# Patient Record
Sex: FEMALE | Race: WHITE | Employment: OTHER | URBAN - METROPOLITAN AREA
[De-identification: names, ages, dates, MRNs, and addresses within clinical notes are randomized per-mention and may not be internally consistent; named-entity substitution may affect disease eponyms.]

---

## 2021-01-03 ENCOUNTER — APPOINTMENT (OUTPATIENT)
Dept: GENERAL RADIOLOGY | Age: 71
DRG: 176 | End: 2021-01-03
Payer: MEDICARE

## 2021-01-03 ENCOUNTER — APPOINTMENT (OUTPATIENT)
Dept: CT IMAGING | Age: 71
DRG: 176 | End: 2021-01-03
Payer: MEDICARE

## 2021-01-03 ENCOUNTER — HOSPITAL ENCOUNTER (INPATIENT)
Age: 71
LOS: 3 days | Discharge: HOME | DRG: 176 | End: 2021-01-06
Attending: EMERGENCY MEDICINE | Admitting: INTERNAL MEDICINE
Payer: MEDICARE

## 2021-01-03 DIAGNOSIS — I26.99 ACUTE PULMONARY EMBOLISM, UNSPECIFIED PULMONARY EMBOLISM TYPE, UNSPECIFIED WHETHER ACUTE COR PULMONALE PRESENT (HCC): Primary | ICD-10-CM

## 2021-01-03 LAB
ABSOLUTE EOS #: 0.08 K/UL (ref 0–0.44)
ABSOLUTE IMMATURE GRANULOCYTE: 0.02 K/UL (ref 0–0.3)
ABSOLUTE LYMPH #: 1.43 K/UL (ref 1.1–3.7)
ABSOLUTE MONO #: 0.59 K/UL (ref 0.1–1.2)
ANION GAP SERPL CALCULATED.3IONS-SCNC: 14 MMOL/L (ref 9–17)
BASOPHILS # BLD: 1 % (ref 0–2)
BASOPHILS ABSOLUTE: 0.05 K/UL (ref 0–0.2)
BNP INTERPRETATION: ABNORMAL
BUN BLDV-MCNC: 19 MG/DL (ref 8–23)
BUN/CREAT BLD: 22 (ref 9–20)
C-REACTIVE PROTEIN: 22.7 MG/L (ref 0–5)
CALCIUM SERPL-MCNC: 8.9 MG/DL (ref 8.6–10.4)
CHLORIDE BLD-SCNC: 106 MMOL/L (ref 98–107)
CO2: 18 MMOL/L (ref 20–31)
CREAT SERPL-MCNC: 0.85 MG/DL (ref 0.5–0.9)
DIFFERENTIAL TYPE: ABNORMAL
EOSINOPHILS RELATIVE PERCENT: 1 % (ref 1–4)
FERRITIN: 144 UG/L (ref 13–150)
GFR AFRICAN AMERICAN: >60 ML/MIN
GFR NON-AFRICAN AMERICAN: >60 ML/MIN
GFR SERPL CREATININE-BSD FRML MDRD: ABNORMAL ML/MIN/{1.73_M2}
GFR SERPL CREATININE-BSD FRML MDRD: ABNORMAL ML/MIN/{1.73_M2}
GLUCOSE BLD-MCNC: 155 MG/DL (ref 70–99)
HCT VFR BLD CALC: 41.5 % (ref 36.3–47.1)
HEMOGLOBIN: 12.1 G/DL (ref 11.9–15.1)
IMMATURE GRANULOCYTES: 0 %
INR BLD: 1.1
LYMPHOCYTES # BLD: 21 % (ref 24–43)
MCH RBC QN AUTO: 28.7 PG (ref 25.2–33.5)
MCHC RBC AUTO-ENTMCNC: 29.2 G/DL (ref 28.4–34.8)
MCV RBC AUTO: 98.6 FL (ref 82.6–102.9)
MONOCYTES # BLD: 9 % (ref 3–12)
MYOGLOBIN: 25 NG/ML (ref 25–58)
MYOGLOBIN: 32 NG/ML (ref 25–58)
NRBC AUTOMATED: 0 PER 100 WBC
PARTIAL THROMBOPLASTIN TIME: 25.4 SEC (ref 23.9–33.8)
PDW BLD-RTO: 12.6 % (ref 11.8–14.4)
PLATELET # BLD: 144 K/UL (ref 138–453)
PLATELET ESTIMATE: ABNORMAL
PMV BLD AUTO: 11.4 FL (ref 8.1–13.5)
POTASSIUM SERPL-SCNC: 3.5 MMOL/L (ref 3.7–5.3)
PRO-BNP: 6225 PG/ML
PROCALCITONIN: 0.07 NG/ML
PROTHROMBIN TIME: 13.7 SEC (ref 11.5–14.2)
RBC # BLD: 4.21 M/UL (ref 3.95–5.11)
RBC # BLD: ABNORMAL 10*6/UL
SEG NEUTROPHILS: 68 % (ref 36–65)
SEGMENTED NEUTROPHILS ABSOLUTE COUNT: 4.63 K/UL (ref 1.5–8.1)
SODIUM BLD-SCNC: 138 MMOL/L (ref 135–144)
TROPONIN INTERP: ABNORMAL
TROPONIN T: ABNORMAL NG/ML
TROPONIN, HIGH SENSITIVITY: 25 NG/L (ref 0–14)
TROPONIN, HIGH SENSITIVITY: 26 NG/L (ref 0–14)
TROPONIN, HIGH SENSITIVITY: 28 NG/L (ref 0–14)
WBC # BLD: 6.8 K/UL (ref 3.5–11.3)
WBC # BLD: ABNORMAL 10*3/UL

## 2021-01-03 PROCEDURE — 80048 BASIC METABOLIC PNL TOTAL CA: CPT

## 2021-01-03 PROCEDURE — 82728 ASSAY OF FERRITIN: CPT

## 2021-01-03 PROCEDURE — 2060000000 HC ICU INTERMEDIATE R&B

## 2021-01-03 PROCEDURE — 6360000004 HC RX CONTRAST MEDICATION: Performed by: EMERGENCY MEDICINE

## 2021-01-03 PROCEDURE — 93005 ELECTROCARDIOGRAM TRACING: CPT | Performed by: EMERGENCY MEDICINE

## 2021-01-03 PROCEDURE — 2580000003 HC RX 258: Performed by: EMERGENCY MEDICINE

## 2021-01-03 PROCEDURE — 6360000002 HC RX W HCPCS: Performed by: EMERGENCY MEDICINE

## 2021-01-03 PROCEDURE — 83880 ASSAY OF NATRIURETIC PEPTIDE: CPT

## 2021-01-03 PROCEDURE — 71045 X-RAY EXAM CHEST 1 VIEW: CPT

## 2021-01-03 PROCEDURE — 86140 C-REACTIVE PROTEIN: CPT

## 2021-01-03 PROCEDURE — 99284 EMERGENCY DEPT VISIT MOD MDM: CPT

## 2021-01-03 PROCEDURE — 85025 COMPLETE CBC W/AUTO DIFF WBC: CPT

## 2021-01-03 PROCEDURE — 84484 ASSAY OF TROPONIN QUANT: CPT

## 2021-01-03 PROCEDURE — 83874 ASSAY OF MYOGLOBIN: CPT

## 2021-01-03 PROCEDURE — 71260 CT THORAX DX C+: CPT

## 2021-01-03 PROCEDURE — 84145 PROCALCITONIN (PCT): CPT

## 2021-01-03 PROCEDURE — 85730 THROMBOPLASTIN TIME PARTIAL: CPT

## 2021-01-03 PROCEDURE — 85610 PROTHROMBIN TIME: CPT

## 2021-01-03 PROCEDURE — 36415 COLL VENOUS BLD VENIPUNCTURE: CPT

## 2021-01-03 PROCEDURE — 99223 1ST HOSP IP/OBS HIGH 75: CPT | Performed by: NURSE PRACTITIONER

## 2021-01-03 RX ORDER — HEPARIN SODIUM 10000 [USP'U]/100ML
17.14 INJECTION, SOLUTION INTRAVENOUS CONTINUOUS
Status: DISCONTINUED | OUTPATIENT
Start: 2021-01-03 | End: 2021-01-06

## 2021-01-03 RX ORDER — SODIUM CHLORIDE 0.9 % (FLUSH) 0.9 %
10 SYRINGE (ML) INJECTION PRN
Status: DISCONTINUED | OUTPATIENT
Start: 2021-01-03 | End: 2021-01-06 | Stop reason: HOSPADM

## 2021-01-03 RX ORDER — HEPARIN SODIUM 1000 [USP'U]/ML
40 INJECTION, SOLUTION INTRAVENOUS; SUBCUTANEOUS PRN
Status: DISCONTINUED | OUTPATIENT
Start: 2021-01-03 | End: 2021-01-06 | Stop reason: ALTCHOICE

## 2021-01-03 RX ORDER — 0.9 % SODIUM CHLORIDE 0.9 %
80 INTRAVENOUS SOLUTION INTRAVENOUS ONCE
Status: COMPLETED | OUTPATIENT
Start: 2021-01-03 | End: 2021-01-03

## 2021-01-03 RX ORDER — HEPARIN SODIUM 1000 [USP'U]/ML
80 INJECTION, SOLUTION INTRAVENOUS; SUBCUTANEOUS ONCE
Status: COMPLETED | OUTPATIENT
Start: 2021-01-03 | End: 2021-01-03

## 2021-01-03 RX ORDER — HEPARIN SODIUM 1000 [USP'U]/ML
80 INJECTION, SOLUTION INTRAVENOUS; SUBCUTANEOUS PRN
Status: DISCONTINUED | OUTPATIENT
Start: 2021-01-03 | End: 2021-01-06 | Stop reason: ALTCHOICE

## 2021-01-03 RX ORDER — POLYETHYLENE GLYCOL 3350 17 G/17G
17 POWDER, FOR SOLUTION ORAL DAILY PRN
Status: DISCONTINUED | OUTPATIENT
Start: 2021-01-03 | End: 2021-01-06 | Stop reason: HOSPADM

## 2021-01-03 RX ORDER — CARVEDILOL 3.12 MG/1
3.12 TABLET ORAL 2 TIMES DAILY WITH MEALS
Status: DISCONTINUED | OUTPATIENT
Start: 2021-01-04 | End: 2021-01-06 | Stop reason: HOSPADM

## 2021-01-03 RX ORDER — ACETAMINOPHEN 650 MG/1
650 SUPPOSITORY RECTAL EVERY 6 HOURS PRN
Status: DISCONTINUED | OUTPATIENT
Start: 2021-01-03 | End: 2021-01-06 | Stop reason: HOSPADM

## 2021-01-03 RX ORDER — NICOTINE 21 MG/24HR
1 PATCH, TRANSDERMAL 24 HOURS TRANSDERMAL DAILY PRN
Status: DISCONTINUED | OUTPATIENT
Start: 2021-01-03 | End: 2021-01-06 | Stop reason: HOSPADM

## 2021-01-03 RX ORDER — SODIUM CHLORIDE 0.9 % (FLUSH) 0.9 %
10 SYRINGE (ML) INJECTION EVERY 12 HOURS SCHEDULED
Status: DISCONTINUED | OUTPATIENT
Start: 2021-01-03 | End: 2021-01-06 | Stop reason: HOSPADM

## 2021-01-03 RX ORDER — LISINOPRIL 10 MG/1
5 TABLET ORAL DAILY
Status: DISCONTINUED | OUTPATIENT
Start: 2021-01-04 | End: 2021-01-06 | Stop reason: HOSPADM

## 2021-01-03 RX ORDER — ACETAMINOPHEN 325 MG/1
650 TABLET ORAL EVERY 6 HOURS PRN
Status: DISCONTINUED | OUTPATIENT
Start: 2021-01-03 | End: 2021-01-06 | Stop reason: HOSPADM

## 2021-01-03 RX ORDER — ONDANSETRON 2 MG/ML
4 INJECTION INTRAMUSCULAR; INTRAVENOUS EVERY 6 HOURS PRN
Status: DISCONTINUED | OUTPATIENT
Start: 2021-01-03 | End: 2021-01-06 | Stop reason: HOSPADM

## 2021-01-03 RX ORDER — PROMETHAZINE HYDROCHLORIDE 12.5 MG/1
12.5 TABLET ORAL EVERY 6 HOURS PRN
Status: DISCONTINUED | OUTPATIENT
Start: 2021-01-03 | End: 2021-01-06 | Stop reason: HOSPADM

## 2021-01-03 RX ADMIN — IOPAMIDOL 75 ML: 755 INJECTION, SOLUTION INTRAVENOUS at 20:35

## 2021-01-03 RX ADMIN — HEPARIN SODIUM AND DEXTROSE 17.14 UNITS/KG/HR: 10000; 5 INJECTION INTRAVENOUS at 22:21

## 2021-01-03 RX ADMIN — HEPARIN SODIUM 9800 UNITS: 1000 INJECTION INTRAVENOUS; SUBCUTANEOUS at 22:21

## 2021-01-03 RX ADMIN — SODIUM CHLORIDE 80 ML: 9 INJECTION, SOLUTION INTRAVENOUS at 20:35

## 2021-01-03 RX ADMIN — Medication 10 ML: at 20:35

## 2021-01-03 SDOH — HEALTH STABILITY: MENTAL HEALTH: HOW OFTEN DO YOU HAVE A DRINK CONTAINING ALCOHOL?: NEVER

## 2021-01-03 ASSESSMENT — ENCOUNTER SYMPTOMS
FACIAL SWELLING: 0
EYE REDNESS: 0
VOMITING: 0
DIARRHEA: 0
COUGH: 1
COLOR CHANGE: 0
SHORTNESS OF BREATH: 1
EYE DISCHARGE: 0
ABDOMINAL PAIN: 0
CONSTIPATION: 0

## 2021-01-03 NOTE — ED NOTES
Patient presents to the er with SOB. Patient was recently seen at Urgent Care and sent home with Rx treating for URI. Was later called back and told to stop meds and follow at Emergency room setting for fluid overload. Patient verbalizing has not been to a physician in 10 years, states is not on any medications. Patient denies fever or chills and reports Covid test at Urgent care was negative.      Jennifer Lockett RN  01/03/21 3131

## 2021-01-03 NOTE — ED PROVIDER NOTES
arthralgias. Skin: Negative for color change and rash. Neurological: Negative for syncope, numbness and headaches. Hematological: Negative for adenopathy. Psychiatric/Behavioral: Negative for confusion. The patient is not nervous/anxious. Except as noted above the remainder of the review of systems was reviewed and negative. PHYSICAL EXAM    (up to 7 for level 4, 8 or more for level 5)     Vitals:    01/03/21 1651 01/03/21 1653 01/03/21 1854 01/03/21 2000   BP:  (!) 157/99 101/71 (!) 153/109   Pulse:  131 115 109   Resp:  18 25 23   Temp:  97.9 °F (36.6 °C)     SpO2:  91% 97% 97%   Weight: 270 lb (122.5 kg)          Physical Exam  Vitals signs reviewed. Constitutional:       General: She is not in acute distress. Appearance: She is well-developed. She is not diaphoretic. HENT:      Head: Normocephalic and atraumatic. Eyes:      General:         Right eye: No discharge. Left eye: No discharge. Neck:      Musculoskeletal: Neck supple. Cardiovascular:      Rate and Rhythm: Tachycardia present. Pulmonary:      Effort: Pulmonary effort is normal. No respiratory distress. Breath sounds: No stridor. Abdominal:      General: There is no distension. Musculoskeletal: Normal range of motion. Lymphadenopathy:      Cervical: No cervical adenopathy. Skin:     Findings: No erythema or rash. Neurological:      Mental Status: She is alert and oriented to person, place, and time. Psychiatric:      Comments: She appears anxious.              DIAGNOSTIC RESULTS     EKG: All EKG's are interpreted by the Emergency Department Physician who either signs or Co-signs this chart in the absence of a cardiologist.    RADIOLOGY:   Non-plain film images such as CT, Ultrasound and MRI are read by the radiologist. Plain radiographic images are visualized and preliminarily interpreted by the emergency physician with the below findings:    Interpretation per the Radiologist below, if available at the time of this note:        LABS:  Labs Reviewed   BASIC METABOLIC PANEL - Abnormal; Notable for the following components:       Result Value    Glucose 155 (*)     Bun/Cre Ratio 22 (*)     Potassium 3.5 (*)     CO2 18 (*)     All other components within normal limits   BRAIN NATRIURETIC PEPTIDE - Abnormal; Notable for the following components:    Pro-BNP 6,225 (*)     All other components within normal limits   CBC WITH AUTO DIFFERENTIAL - Abnormal; Notable for the following components:    Seg Neutrophils 68 (*)     Lymphocytes 21 (*)     All other components within normal limits   TROP/MYOGLOBIN - Abnormal; Notable for the following components:    Troponin, High Sensitivity 28 (*)     All other components within normal limits   TROP/MYOGLOBIN - Abnormal; Notable for the following components:    Troponin, High Sensitivity 26 (*)     All other components within normal limits   APTT   PROTIME-INR   FERRITIN   C-REACTIVE PROTEIN   PROCALCITONIN       All other labs were within normal range or not returned as of this dictation. EMERGENCY DEPARTMENT COURSE and DIFFERENTIAL DIAGNOSIS/MDM:   Vitals:    Vitals:    01/03/21 1651 01/03/21 1653 01/03/21 1854 01/03/21 2000   BP:  (!) 157/99 101/71 (!) 153/109   Pulse:  131 115 109   Resp:  18 25 23   Temp:  97.9 °F (36.6 °C)     SpO2:  91% 97% 97%   Weight: 270 lb (122.5 kg)          Orders Placed This Encounter   Medications    0.9 % sodium chloride bolus    sodium chloride flush 0.9 % injection 10 mL    iopamidol (ISOVUE-370) 76 % injection 75 mL       Medical Decision Making: Tests are ordered and the patient is signed out to subsequent physician.           (Please note that portions of this note were completed with a voice recognition program.  Efforts were made to edit the dictations but occasionally words are mis-transcribed.)    Noris Matute MD  Attending Emergency Physician           Noris Matute MD  01/03/21 2041

## 2021-01-04 ENCOUNTER — APPOINTMENT (OUTPATIENT)
Dept: GENERAL RADIOLOGY | Age: 71
DRG: 176 | End: 2021-01-04
Payer: MEDICARE

## 2021-01-04 PROBLEM — I27.20 MILD PULMONARY HYPERTENSION (HCC): Status: ACTIVE | Noted: 2021-01-04

## 2021-01-04 PROBLEM — D64.9 ANEMIA, NORMOCYTIC NORMOCHROMIC: Status: ACTIVE | Noted: 2021-01-04

## 2021-01-04 PROBLEM — R79.89 TROPONIN LEVEL ELEVATED: Status: ACTIVE | Noted: 2021-01-04

## 2021-01-04 PROBLEM — R77.8 TROPONIN LEVEL ELEVATED: Status: ACTIVE | Noted: 2021-01-04

## 2021-01-04 PROBLEM — R03.0 ELEVATED BLOOD PRESSURE READING: Status: ACTIVE | Noted: 2021-01-04

## 2021-01-04 LAB
ANTI-XA UNFRAC HEPARIN: 0.6 IU/L (ref 0.3–0.7)
ANTI-XA UNFRAC HEPARIN: 0.69 IU/L (ref 0.3–0.7)
ANTI-XA UNFRAC HEPARIN: 1.09 IU/L (ref 0.3–0.7)
ANTI-XA UNFRAC HEPARIN: >1.1 IU/L (ref 0.3–0.7)
BNP INTERPRETATION: ABNORMAL
CHOLESTEROL/HDL RATIO: 2.9
CHOLESTEROL: 138 MG/DL
HCT VFR BLD CALC: 35.1 % (ref 36.3–47.1)
HDLC SERPL-MCNC: 48 MG/DL
HEMOGLOBIN: 11 G/DL (ref 11.9–15.1)
LDL CHOLESTEROL: 68 MG/DL (ref 0–130)
LV EF: 60 %
LVEF MODALITY: NORMAL
MAGNESIUM: 1.8 MG/DL (ref 1.6–2.6)
MCH RBC QN AUTO: 29.3 PG (ref 25.2–33.5)
MCHC RBC AUTO-ENTMCNC: 31.3 G/DL (ref 28.4–34.8)
MCV RBC AUTO: 93.4 FL (ref 82.6–102.9)
NRBC AUTOMATED: 0 PER 100 WBC
PDW BLD-RTO: 12.7 % (ref 11.8–14.4)
PLATELET # BLD: 148 K/UL (ref 138–453)
PMV BLD AUTO: 11.1 FL (ref 8.1–13.5)
PRO-BNP: 3290 PG/ML
RBC # BLD: 3.76 M/UL (ref 3.95–5.11)
TRIGL SERPL-MCNC: 108 MG/DL
TROPONIN INTERP: ABNORMAL
TROPONIN T: ABNORMAL NG/ML
TROPONIN, HIGH SENSITIVITY: 26 NG/L (ref 0–14)
TSH SERPL DL<=0.05 MIU/L-ACNC: <0.01 MIU/L (ref 0.3–5)
VLDLC SERPL CALC-MCNC: NORMAL MG/DL (ref 1–30)
WBC # BLD: 7.2 K/UL (ref 3.5–11.3)

## 2021-01-04 PROCEDURE — 93306 TTE W/DOPPLER COMPLETE: CPT

## 2021-01-04 PROCEDURE — 85027 COMPLETE CBC AUTOMATED: CPT

## 2021-01-04 PROCEDURE — 85520 HEPARIN ASSAY: CPT

## 2021-01-04 PROCEDURE — 99232 SBSQ HOSP IP/OBS MODERATE 35: CPT | Performed by: INTERNAL MEDICINE

## 2021-01-04 PROCEDURE — 6370000000 HC RX 637 (ALT 250 FOR IP): Performed by: NURSE PRACTITIONER

## 2021-01-04 PROCEDURE — 6360000002 HC RX W HCPCS: Performed by: EMERGENCY MEDICINE

## 2021-01-04 PROCEDURE — 80061 LIPID PANEL: CPT

## 2021-01-04 PROCEDURE — 83735 ASSAY OF MAGNESIUM: CPT

## 2021-01-04 PROCEDURE — 2060000000 HC ICU INTERMEDIATE R&B

## 2021-01-04 PROCEDURE — 83880 ASSAY OF NATRIURETIC PEPTIDE: CPT

## 2021-01-04 PROCEDURE — 84443 ASSAY THYROID STIM HORMONE: CPT

## 2021-01-04 PROCEDURE — 84484 ASSAY OF TROPONIN QUANT: CPT

## 2021-01-04 PROCEDURE — 36415 COLL VENOUS BLD VENIPUNCTURE: CPT

## 2021-01-04 PROCEDURE — 71045 X-RAY EXAM CHEST 1 VIEW: CPT

## 2021-01-04 RX ADMIN — CARVEDILOL 3.12 MG: 3.12 TABLET, FILM COATED ORAL at 08:28

## 2021-01-04 RX ADMIN — LISINOPRIL 5 MG: 10 TABLET ORAL at 08:28

## 2021-01-04 RX ADMIN — CARVEDILOL 3.12 MG: 3.12 TABLET, FILM COATED ORAL at 18:29

## 2021-01-04 RX ADMIN — HEPARIN SODIUM AND DEXTROSE 14.14 UNITS/KG/HR: 10000; 5 INJECTION INTRAVENOUS at 05:54

## 2021-01-04 ASSESSMENT — ENCOUNTER SYMPTOMS
NAUSEA: 0
COLOR CHANGE: 0
WHEEZING: 0
BLOOD IN STOOL: 0
ABDOMINAL PAIN: 0
CONSTIPATION: 0
COUGH: 1
SHORTNESS OF BREATH: 1
DIARRHEA: 0
VOMITING: 0
COUGH: 0

## 2021-01-04 NOTE — H&P
additional symptomology or definitive modifying factors. She denies past cardiac history or previous history of PE/DVT. She visited the [facility] urgent care on [address] today. They completed a CXR and stated that she was fluid overloaded. They also conducted a COVID-19 swab that was negative. Records have been requested. EKG shows sinus tachycardia without overt ST elevation or depression. Potassium 3.5, CRP 22.7, pro BNP 6,225, high sensitivity troponin 28, 26, WBC 6.8. CT chest with contrast indicates fairly extensive bilateral pulmonary emboli as described in radiological report. Cholelithiasis. Past Medical History:     History reviewed. No pertinent past medical history. Denies history of hypertension, diabetes, asthma and cancer. Past Surgical History:     Past Surgical History:   Procedure Laterality Date    KNEE ARTHROSCOPY        Medications Prior to Admission:     Prior to Admission medications    Not on File   Denies home medications. Allergies:     Patient has no known allergies. Social History:     Tobacco:    reports that she has never smoked. She has never used smokeless tobacco.  Alcohol:      reports no history of alcohol use. Drug Use:  reports no history of drug use. Family History:     Family History   Problem Relation Age of Onset    Cancer Mother     Kidney Disease Father      Review of Systems:     Positive and Negative as described in HPI. Review of Systems   Constitutional: Negative for chills, diaphoresis and fever. HENT: Negative for congestion. Eyes: Negative for visual disturbance. Respiratory: Positive for shortness of breath. Negative for cough and wheezing. Cardiovascular: Positive for leg swelling. Negative for palpitations. Chest heaviness    Gastrointestinal: Negative for abdominal pain, blood in stool, constipation, diarrhea, nausea and vomiting.    Endocrine: Negative for cold intolerance and heat intolerance. Genitourinary: Negative for difficulty urinating, dysuria, frequency and urgency. Musculoskeletal: Negative for arthralgias and myalgias. Skin: Negative for color change and rash. Neurological: Negative for dizziness, weakness, light-headedness, numbness and headaches. Hematological: Does not bruise/bleed easily. Psychiatric/Behavioral: The patient is not nervous/anxious. All other systems reviewed and are negative. Physical Exam:   BP (!) 115/50   Pulse 114   Temp 97.9 °F (36.6 °C)   Resp 18   Wt 270 lb (122.5 kg)   SpO2 97%   Temp (24hrs), Av.9 °F (36.6 °C), Min:97.9 °F (36.6 °C), Max:97.9 °F (36.6 °C)    No results for input(s): POCGLU in the last 72 hours. No intake or output data in the 24 hours ending 21 6669    Physical Exam  Vitals signs and nursing note reviewed. Constitutional:       General: She is not in acute distress. Appearance: She is obese. She is not diaphoretic. HENT:      Head: Normocephalic and atraumatic. Right Ear: Hearing normal.      Left Ear: Hearing normal.      Nose: Nose normal. No rhinorrhea. Eyes:      General: Lids are normal.      Extraocular Movements:      Right eye: Normal extraocular motion. Left eye: Normal extraocular motion. Conjunctiva/sclera: Conjunctivae normal.      Right eye: Right conjunctiva is not injected. Left eye: Left conjunctiva is not injected. Pupils: Pupils are equal, round, and reactive to light. Pupils are equal.      Right eye: Pupil is reactive. Left eye: Pupil is reactive. Neck:      Musculoskeletal: Neck supple. Thyroid: No thyromegaly. Vascular: No carotid bruit. Trachea: Trachea and phonation normal. No tracheal deviation. Cardiovascular:      Rate and Rhythm: Regular rhythm. Tachycardia present. Pulses: Normal pulses. Heart sounds: Normal heart sounds. No murmur.    Pulmonary:      Effort: Pulmonary effort is normal. No respiratory distress. Breath sounds: No stridor. Examination of the right-middle field reveals wheezing. Examination of the left-middle field reveals wheezing. Examination of the right-lower field reveals decreased breath sounds and wheezing. Examination of the left-lower field reveals decreased breath sounds and wheezing. Decreased breath sounds and wheezing present. Abdominal:      General: Bowel sounds are normal. There is no distension. Palpations: Abdomen is soft. There is no mass. Tenderness: There is no abdominal tenderness. There is no guarding. Musculoskeletal:         General: No tenderness. Right lower le+ Pitting Edema present. Left lower le+ Pitting Edema present. Skin:     General: Skin is warm and dry. Findings: No erythema, lesion or rash. Neurological:      Mental Status: She is alert and oriented to person, place, and time. She is not disoriented. Cranial Nerves: No cranial nerve deficit. Psychiatric:         Speech: Speech normal.         Behavior: Behavior normal. Behavior is cooperative.        Investigations:      Laboratory Testing:  Recent Results (from the past 24 hour(s))   Basic Metabolic Panel    Collection Time: 21  5:11 PM   Result Value Ref Range    Glucose 155 (H) 70 - 99 mg/dL    BUN 19 8 - 23 mg/dL    CREATININE 0.85 0.50 - 0.90 mg/dL    Bun/Cre Ratio 22 (H) 9 - 20    Calcium 8.9 8.6 - 10.4 mg/dL    Sodium 138 135 - 144 mmol/L    Potassium 3.5 (L) 3.7 - 5.3 mmol/L    Chloride 106 98 - 107 mmol/L    CO2 18 (L) 20 - 31 mmol/L    Anion Gap 14 9 - 17 mmol/L    GFR Non-African American >60 >60 mL/min    GFR African American >60 >60 mL/min    GFR Comment          GFR Staging NOT REPORTED    Brain Natriuretic Peptide    Collection Time: 21  5:11 PM   Result Value Ref Range    Pro-BNP 6,225 (H) <300 pg/mL    BNP Interpretation Pro-BNP Reference Range:    CBC Auto Differential    Collection Time: 21  5:11 PM   Result Value Ref Range WBC 6.8 3.5 - 11.3 k/uL    RBC 4.21 3.95 - 5.11 m/uL    Hemoglobin 12.1 11.9 - 15.1 g/dL    Hematocrit 41.5 36.3 - 47.1 %    MCV 98.6 82.6 - 102.9 fL    MCH 28.7 25.2 - 33.5 pg    MCHC 29.2 28.4 - 34.8 g/dL    RDW 12.6 11.8 - 14.4 %    Platelets 844 747 - 466 k/uL    MPV 11.4 8.1 - 13.5 fL    NRBC Automated 0.0 0.0 per 100 WBC    Differential Type NOT REPORTED     Seg Neutrophils 68 (H) 36 - 65 %    Lymphocytes 21 (L) 24 - 43 %    Monocytes 9 3 - 12 %    Eosinophils % 1 1 - 4 %    Basophils 1 0 - 2 %    Immature Granulocytes 0 0 %    Segs Absolute 4.63 1.50 - 8.10 k/uL    Absolute Lymph # 1.43 1.10 - 3.70 k/uL    Absolute Mono # 0.59 0.10 - 1.20 k/uL    Absolute Eos # 0.08 0.00 - 0.44 k/uL    Basophils Absolute 0.05 0.00 - 0.20 k/uL    Absolute Immature Granulocyte 0.02 0.00 - 0.30 k/uL    WBC Morphology NOT REPORTED     RBC Morphology NOT REPORTED     Platelet Estimate NOT REPORTED    TROP/MYOGLOBIN    Collection Time: 01/03/21  5:11 PM   Result Value Ref Range    Troponin, High Sensitivity 28 (H) 0 - 14 ng/L    Troponin T NOT REPORTED <0.03 ng/mL    Troponin Interp NOT REPORTED     Myoglobin 32 25 - 58 ng/mL   APTT    Collection Time: 01/03/21  5:11 PM   Result Value Ref Range    PTT 25.4 23.9 - 33.8 sec   Protime-INR    Collection Time: 01/03/21  5:11 PM   Result Value Ref Range    Protime 13.7 11.5 - 14.2 sec    INR 1.1    FERRITIN    Collection Time: 01/03/21  5:11 PM   Result Value Ref Range    Ferritin 144 13 - 150 ug/L   C-Reactive Protein    Collection Time: 01/03/21  5:11 PM   Result Value Ref Range    CRP 22.7 (H) 0.0 - 5.0 mg/L   Procalcitonin    Collection Time: 01/03/21  5:11 PM   Result Value Ref Range    Procalcitonin 0.07 <0.09 ng/mL   EKG 12 Lead    Collection Time: 01/03/21  5:12 PM   Result Value Ref Range    Ventricular Rate 112 BPM    Atrial Rate 112 BPM    P-R Interval 148 ms    QRS Duration 94 ms    Q-T Interval 344 ms    QTc Calculation (Bazett) 469 ms    P Axis 50 degrees    R Axis 7 degrees    T Axis 58 degrees   TROP/MYOGLOBIN    Collection Time: 01/03/21  7:11 PM   Result Value Ref Range    Troponin, High Sensitivity 26 (H) 0 - 14 ng/L    Troponin T NOT REPORTED <0.03 ng/mL    Troponin Interp NOT REPORTED     Myoglobin 25 25 - 58 ng/mL       Imaging/Diagnostics:  Xr Chest Portable    Result Date: 1/3/2021  No acute cardiopulmonary disease     Ct Chest Pulmonary Embolism W Contrast    Result Date: 1/3/2021  Fairly extensive bilateral pulmonary emboli as described above Cholelithiasis Critical results were called by Dr. Debra Palacio MD to Cole Raf on 1/3/2021 at 18:09. Assessment :      Hospital Problems           Last Modified POA    * (Principal) Bilateral pulmonary embolism ([address] 1/3/2021 Yes    Obesity due to excess calories without serious comorbidity 1/3/2021 Yes        Plan:     Patient status inpatient in the  Medical ICU    1. EKG, CXR and CT reviewed. 2. Echocardiogram.  3. High intensity heparin drip. 4. Initiate carvedilol & lisinopril. 5. Monitor vital signs. 6. Follow chemistries. 7. Check lipids, TSH.   8. Supplemental oxygen as needed. 9. Urgent care records including negative COVID-19 results requested. 10. Obesity- lifestyle modifications. 11. Cardiac diet. 12. Activity as tolerated with assistance. Plan of care discussed with patient and patient's daughter Mikayla Carlson) at bedside. Consultations:   IP CONSULT TO INTERNAL MEDICINE  IP CONSULT TO HEART FAILURE NURSE/COORDINATOR  IP CONSULT TO DIETITIAN    Patient is admitted as inpatient status because of co-morbidities listed above, severity of signs and symptoms as outlined, requirement for current medical therapies and most importantly because of direct risk to patient if care not provided in a hospital setting. Expected length of stay > 48 hours. ALBINO Lopez - CNP  1/3/2021  10:54 PM    Copy sent to Dr. Aiden Langston primary care provider on file.

## 2021-01-04 NOTE — PROGRESS NOTES
[facility]    Office: [address], DO, Paola [place], DO, Meme Calles, DO, Susan Kendrick Blood, DO, Asia Wallace MD, Sandeep Davila MD, Jayme Han MD, Radha Goldman MD, Lacho Smith MD, Nicholas Helm MD, Raquel Boyer MD, Carol Porter MD, Weston Joiner MD, Jeppie Soulier, DO, Dong Barnett MD, Bonnie Shrestha MD, Pal Cardona DO, Stella Eller MD,  Carlene Morel, DO, Deepika Duke MD, Claudia Owens MD, Asim Kelly CNP, [facility], CNP, Jessie Babcock, CNP, Chio Anderson, CNS, Katerina Howell, CNP, Lisa Hudson, CNP, Sanjuanita Judge, CNP, Chris Villalobos, CNP, Ryder Sousa, CNP, Ozzy Turner PA-C, Annie Hsieh, RAYMOND, Ruben Fox, CNP, Sergio Martin, CNP, Bina Taylor, CNP, Shiva Samuel, CNP, Elvis Severs, CNP    [address]    Progress Note    1/4/2021    7:09 PM    Name:   Shanique Dumont  MRN:     [id number]     [place]zen[place]side:      [de-identified]   Room:   [address] [place] [address] Day:  1  Admit Date:  1/3/2021  4:56 PM    PCP:   No primary care provider on file. Code Status:  Full Code    Subjective:     C/C:   Chief Complaint   Patient presents with    Shortness of Breath       Interval History Status:  Improved  Lying flat  Not short of breath  Less dyspnea on exertion    Data Base Updates:  Troponin, High [id number]     Olueepvlfz24.0Low     Echo done: See below  Mild pulmonary hypertension noted    Blood pressures in the 160s/100s noted      Brief History:     As documented in the medical record:  Alee Luz is a 65 y.o. Non-/non  female who presents with Shortness of Breath   and is admitted to the hospital for the management of Bilateral pulmonary embolism ([facility] [address]    The patient presents to the hospital with complaint of shortness of breath. She reports her symptoms started 7-10 days ago and have progressively worsened.  She states that she becomes extremely short of breath with minimal activity. She is able to catch her breath if she rests. She also endorses swelling to her bilateral lower extremities and chest heaviness with activity. She denies fever, chills, nausea, vomiting. No additional symptomology or definitive modifying factors. She denies past cardiac history or previous history of PE/DVT.    She visited the [facility] urgent care on [address] today. They completed a CXR and stated that she was fluid overloaded. They also conducted a COVID-19 swab that was negative. Records have been requested.      EKG shows sinus tachycardia without overt ST elevation or depression.      Potassium 3.5, CRP 22.7, pro BNP 6,225, high sensitivity troponin 28, 26, WBC 6.8.     CT chest with contrast indicates fairly extensive bilateral pulmonary emboli as described in radiological report. Cholelithiasis. Initial plan included:  Patient status inpatient in the  Medical ICU   1. EKG, CXR and CT reviewed. 2. Echocardiogram.  3. High intensity heparin drip. 4. Initiate carvedilol & lisinopril. 5. Monitor vital signs. 6. Follow chemistries. 7. Check lipids, TSH.   8. Supplemental oxygen as needed. 9. Urgent care records including negative COVID-19 results requested. 10. Obesity- lifestyle modifications. 11. Cardiac diet. 12. Activity as tolerated with assistance.   Plan of care discussed with patient and patient's daughter Ravindra Monique) at bedside. \"     The patient does acknowledge a 3-hour road trip in her car during the holidays  No prior history of DVT or hypercoagulable state  Family history negative    Subsequent database has included:  Azejanavjw48.0Low     Troponin, High [id number]     Echo:  Left ventricle is normal in size   Global left ventricular systolic function is normal   Estimated ejection fraction is 60 % . Left atrium is mildly dilated. Right atrium is mildly dilated . Right ventricular dilatation with normal systolic function.    TAPSE value of 1.88cm noted.   Mild to moderate tricuspid regurgitation. Mild pulmonary hypertension. No pericardial effusion seen. Normal aortic root dimension. Initial EKG revealed:  Sinus tachycardia  Otherwise normal ECG  No previous ECGs available      Medications: Allergies:  No Known Allergies    Current Meds:   Scheduled Meds:    sodium chloride flush  10 mL Intravenous 2 times per day    lisinopril  5 mg Oral Daily    carvedilol  3.125 mg Oral BID WC     Continuous Infusions:    heparin (PORCINE) Infusion 11.14 Units/kg/hr (01/04/21 1132)     PRN Meds: sodium chloride flush, heparin (porcine), heparin (porcine), sodium chloride flush, nicotine, promethazine **OR** ondansetron, polyethylene glycol, acetaminophen **OR** acetaminophen    Data:     Past Medical History:   has no past medical history on file. Social History:   reports that she has never smoked. She has never used smokeless tobacco. She reports that she does not drink alcohol or use drugs. Family History:   Family History   Problem Relation Age of Onset    Cancer Mother     Kidney Disease Father        Review of Systems:     Review of Systems   Constitutional: Positive for activity change (Decreased). Respiratory: Positive for cough (Occasional lightspeed) and shortness of breath (Dyspnea on exertion). No hemoptysis   Cardiovascular: Positive for chest pain (With deep breathing) and leg swelling (Chronic). Negative for palpitations. Gastrointestinal: Negative for abdominal pain, nausea and vomiting. Genitourinary: Negative for flank pain and hematuria. Physical Examination:        Physical Exam  Vitals signs reviewed. Constitutional:       General: She is not in acute distress. Appearance: She is not diaphoretic. HENT:      Head: Normocephalic. Nose: Nose normal.   Eyes:      General: No scleral icterus. Conjunctiva/sclera: Conjunctivae normal.   Neck:      Musculoskeletal: Neck supple.       Trachea: No tracheal deviation. Cardiovascular:      Rate and Rhythm: Normal rate and regular rhythm. Pulmonary:      Effort: Pulmonary effort is normal. No respiratory distress. Breath sounds: Normal breath sounds. No wheezing or rales. Chest:      Chest wall: No tenderness. Abdominal:      General: Bowel sounds are normal. There is no distension. Palpations: Abdomen is soft. Tenderness: There is no abdominal tenderness. Musculoskeletal:         General: Swelling present. No tenderness. Left lower leg: Edema present. Comments: 1-2/4 edema bilaterally   Skin:     General: Skin is warm and dry. Neurological:      Mental Status: She is alert and oriented to person, place, and time. Vitals:  BP (!) 145/78   Pulse 110   Temp 97.5 °F (36.4 °C) (Oral)   Resp 18   Wt 270 lb (122.5 kg)   SpO2 98%   Temp (24hrs), Av.5 °F (36.4 °C), Min:97.5 °F (36.4 °C), Max:97.5 °F (36.4 °C)    No results for input(s): POCGLU in the last 72 hours. I/O (24Hr):   No intake or output data in the 24 hours ending 21 1909    Labs:  Hematology:  Recent Labs     21  17121  0906   WBC 6.8 7.2   RBC 4.21 3.76*   HGB 12.1 11.0*   HCT 41.5 35.1*   MCV 98.6 93.4   MCH 28.7 29.3   MCHC 29.2 31.3   RDW 12.6 12.7    148   MPV 11.4 11.1   CRP 22.7*  --    INR 1.1  --      Chemistry:  Recent Labs     21  17121  1911 21  2249 21  0320 21  0906     --   --   --   --    K 3.5*  --   --   --   --      --   --   --   --    CO2 18*  --   --   --   --    GLUCOSE 155*  --   --   --   --    BUN 19  --   --   --   --    CREATININE 0.85  --   --   --   --    MG  --   --   --   --  1.8   ANIONGAP 14  --   --   --   --    LABGLOM >60  --   --   --   --    GFRAA >60  --   --   --   --    CALCIUM 8.9  --   --   --   --    PROBNP 6,225*  --   --   --  3,290*   TROPHS 28* 26* 25* 26*  --    MYOGLOBIN 32 25  --   --   --      Recent Labs     21  0906   TSH <0.01*   CHOL 138   HDL 48   LDLCHOLESTEROL 68   CHOLHDLRATIO 2.9   TRIG 108   VLDL NOT REPORTED     ABG:No results found for: POCPH, PHART, PH, POCPCO2, LUJ0NKP, PCO2, POCPO2, PO2ART, PO2, POCHCO3, EKA2ELV, HCO3, NBEA, PBEA, BEART, BE, THGBART, THB, FOR3RWR, ZVZI8GOI, W0WITPFA, O2SAT, FIO2  No results found for: SPECIAL  No results found for: CULTURE    Radiology:  Xr Chest Portable    Result Date: 1/4/2021  No acute cardiopulmonary findings     Xr Chest Portable    Result Date: 1/3/2021  No acute cardiopulmonary disease     Ct Chest Pulmonary Embolism W Contrast    Result Date: 1/3/2021  Fairly extensive bilateral pulmonary emboli as described above Cholelithiasis Critical results were called by Dr. Shana Beck MD to Bill Calixto on 1/3/2021 at 18:09. Assessment:        Principal Problem:    Bilateral pulmonary embolism (HCC)  Active Problems:    Obesity due to excess calories without serious comorbidity    Troponin level elevated    Mild pulmonary hypertension (HCC)    Anemia, normocytic normochromic    Elevated blood pressure readings  Resolved Problems:    * No resolved hospital problems.  *      Plan:        Heparin  Does not appear to be an EKOS candidate  Check Doppler  Transition to NOAC  Blood Pressure - Monitor and control  Monitor H&H, observe for any bleeding  Trend troponin, suspect type II elevation    IP CONSULT TO INTERNAL MEDICINE  IP CONSULT TO HEART FAILURE NURSE/COORDINATOR  IP CONSULT TO Neida Clayton DO  1/4/2021  7:09 PM

## 2021-01-04 NOTE — FLOWSHEET NOTE
Patient + daughter was present. Patient a bit anxious. States about her medical problems. Shared about her problems she is dealing with,.  States taking care of her father, then recently her father . , now she is dealing with that. Daughter is grateful for the visit. States patient is strong. Patient to be admitted to ICU.   shared in presence, prayers, support. Follow up as needed. 21 1242   Encounter Summary   Services provided to: Patient and family together   Referral/Consult From: [address] [place] [address] Visiting   (21)   Complexity of Encounter Moderate   Length of Encounter 15 minutes   Spiritual Assessment Completed Yes   Routine   Type Initial   Assessment Passive; Anxious   Intervention Active listening;Explored feelings, thoughts, concerns;Prayer;Sustaining presence/ Ministry of presence; Discussed illness/injury and it's impact; Discussed belief system/Zoroastrianism practices/diann   Outcome Expressed gratitude;Receptive; Expressed feelings/needs/concerns;Engaged in conversation

## 2021-01-04 NOTE — ED NOTES
aMson Harman from [facility] at bedside speaking with patient.       Elicia Montana RN  01/03/21 1894

## 2021-01-04 NOTE — PROGRESS NOTES
Physical Therapy  DATE: 2021    NAME: Mal Kevin  MRN: [id number]   : 1950    Patient not seen this date for Physical Therapy due to:  [] Blood transfusion in progress  [] Hemodialysis  [] Patient Declined  [] Spine Precautions   [] Strict Bedrest  [] Surgery/ Procedure  [] Testing      [x] Other Per notes, pt. With extensive bilat. PE and has not yet been anticoagulated for 24hrs. Will check after 24hrs. [] PT is being discontinued at this time. Patient independent. No further needs. [] PT is being discontinued at this time due to declining physical/ medical status. Therapy is not appropriate at this time.     Padmini Day, PT

## 2021-01-04 NOTE — ED PROVIDER NOTES
EMERGENCY DEPARTMENT ENCOUNTER   ATTENDING ATTESTATION     Pt Name: Michael Forrest  MRN: [id number]  [place] 1950  Date of evaluation: 1/3/21   Michael Forrest is a 65 y.o. female with CC: Shortness of Breath    MDM:   I took over care of patient from Dr. Layton Moran. Patient is a 26-year-old female who presents to the ED for shortness of breath. COVID-19 negative. BNP of 6225. CT angiogram shows bilateral PEs without evidence of right heart strain. Started on heparin drip in the ED. Discussed case with Prosper Sanabria, who accepts patient for admission to hospital               EKG: All EKG's are interpreted by the Emergency Department Physician who either signs or Co-signs this chart in the absence of a cardiologist.      RADIOLOGY:All plain film, CT, MRI, and formal ultrasound images (except ED bedside ultrasound) are read by the radiologist, see reports below, unless otherwise noted in MDM or here. CT CHEST PULMONARY EMBOLISM W CONTRAST   Final Result   Fairly extensive bilateral pulmonary emboli as described above      Cholelithiasis      Critical results were called by Dr. Yeny Leonard MD to Rikki Rocha on   1/3/2021 at 18:09. XR CHEST PORTABLE   Final Result   No acute cardiopulmonary disease         XR CHEST (2 VW)    (Results Pending)     LABS: All lab results were reviewed by myself, and all abnormals are listed below.   Labs Reviewed   BASIC METABOLIC PANEL - Abnormal; Notable for the following components:       Result Value    Glucose 155 (*)     Bun/Cre Ratio 22 (*)     Potassium 3.5 (*)     CO2 18 (*)     All other components within normal limits   BRAIN NATRIURETIC PEPTIDE - Abnormal; Notable for the following components:    Pro-BNP 6,225 (*)     All other components within normal limits   CBC WITH AUTO DIFFERENTIAL - Abnormal; Notable for the following components:    Seg Neutrophils 68 (*)     Lymphocytes 21 (*)     All other components within normal limits TROP/MYOGLOBIN - Abnormal; Notable for the following components:    Troponin, High Sensitivity 28 (*)     All other components within normal limits   TROP/MYOGLOBIN - Abnormal; Notable for the following components:    Troponin, High Sensitivity 26 (*)     All other components within normal limits   C-REACTIVE PROTEIN - Abnormal; Notable for the following components:    CRP 22.7 (*)     All other components within normal limits   TROPONIN - Abnormal; Notable for the following components:    Troponin, High Sensitivity 25 (*)     All other components within normal limits   APTT   PROTIME-INR   FERRITIN   PROCALCITONIN   HEPARIN LEVEL/ANTI-XA   HEPARIN LEVEL/ANTI-XA   MAGNESIUM   CBC   BRAIN NATRIURETIC PEPTIDE   LIPID PANEL   TSH WITHOUT REFLEX   TROPONIN     CONSULTS:  IP CONSULT TO INTERNAL MEDICINE  IP CONSULT TO HEART FAILURE NURSE/COORDINATOR  IP CONSULT TO DIETITIAN  FINAL IMPRESSION      1. Acute pulmonary embolism, unspecified pulmonary embolism type, unspecified whether acute cor pulmonale present ([address]            PASTMEDICAL HISTORY   History reviewed. No pertinent past medical history. SURGICAL HISTORY       Past Surgical History:   Procedure Laterality Date    KNEE ARTHROSCOPY       CURRENT MEDICATIONS       Previous Medications    No medications on file     ALLERGIES     has No Known Allergies. FAMILY HISTORY     She indicated that her mother is . She indicated that her father is . SOCIAL HISTORY       Social History     Tobacco Use    Smoking status: Never Smoker    Smokeless tobacco: Never Used   Substance Use Topics    Alcohol use: Never     Frequency: Never    Drug use: Never       I personally evaluated and examined the patient in conjunction with the APC and agree with the assessment, treatment plan, and disposition of the patient as recorded by the APC.    Emili Burciaga MD  Attending Emergency Physician          Lucila Nunn MD  21 [id number]       Lucila Nunn, MD  01/04/21 7738

## 2021-01-05 LAB
ANTI-XA UNFRAC HEPARIN: 0.58 IU/L (ref 0.3–0.7)
EKG ATRIAL RATE: 112 BPM
EKG P AXIS: 50 DEGREES
EKG P-R INTERVAL: 148 MS
EKG Q-T INTERVAL: 344 MS
EKG QRS DURATION: 94 MS
EKG QTC CALCULATION (BAZETT): 469 MS
EKG R AXIS: 7 DEGREES
EKG T AXIS: 58 DEGREES
EKG VENTRICULAR RATE: 112 BPM
HCT VFR BLD CALC: 35.1 % (ref 36.3–47.1)
HEMOGLOBIN: 10.7 G/DL (ref 11.9–15.1)
MCH RBC QN AUTO: 28.4 PG (ref 25.2–33.5)
MCHC RBC AUTO-ENTMCNC: 30.5 G/DL (ref 28.4–34.8)
MCV RBC AUTO: 93.1 FL (ref 82.6–102.9)
NRBC AUTOMATED: 0 PER 100 WBC
PDW BLD-RTO: 12.7 % (ref 11.8–14.4)
PLATELET # BLD: 157 K/UL (ref 138–453)
PMV BLD AUTO: 11.1 FL (ref 8.1–13.5)
RBC # BLD: 3.77 M/UL (ref 3.95–5.11)
WBC # BLD: 6.5 K/UL (ref 3.5–11.3)

## 2021-01-05 PROCEDURE — 36415 COLL VENOUS BLD VENIPUNCTURE: CPT

## 2021-01-05 PROCEDURE — 97162 PT EVAL MOD COMPLEX 30 MIN: CPT

## 2021-01-05 PROCEDURE — 6360000002 HC RX W HCPCS: Performed by: NURSE PRACTITIONER

## 2021-01-05 PROCEDURE — 93970 EXTREMITY STUDY: CPT

## 2021-01-05 PROCEDURE — 2700000000 HC OXYGEN THERAPY PER DAY

## 2021-01-05 PROCEDURE — 97166 OT EVAL MOD COMPLEX 45 MIN: CPT

## 2021-01-05 PROCEDURE — 2060000000 HC ICU INTERMEDIATE R&B

## 2021-01-05 PROCEDURE — 97530 THERAPEUTIC ACTIVITIES: CPT

## 2021-01-05 PROCEDURE — 99232 SBSQ HOSP IP/OBS MODERATE 35: CPT | Performed by: INTERNAL MEDICINE

## 2021-01-05 PROCEDURE — 85027 COMPLETE CBC AUTOMATED: CPT

## 2021-01-05 PROCEDURE — 97535 SELF CARE MNGMENT TRAINING: CPT

## 2021-01-05 PROCEDURE — 6370000000 HC RX 637 (ALT 250 FOR IP): Performed by: NURSE PRACTITIONER

## 2021-01-05 PROCEDURE — 85520 HEPARIN ASSAY: CPT

## 2021-01-05 PROCEDURE — 97116 GAIT TRAINING THERAPY: CPT

## 2021-01-05 RX ADMIN — HEPARIN SODIUM AND DEXTROSE 11.1 UNITS/KG/HR: 10000; 5 INJECTION INTRAVENOUS at 22:55

## 2021-01-05 RX ADMIN — HEPARIN SODIUM AND DEXTROSE 11.1 UNITS/KG/HR: 10000; 5 INJECTION INTRAVENOUS at 06:17

## 2021-01-05 RX ADMIN — CARVEDILOL 3.12 MG: 3.12 TABLET, FILM COATED ORAL at 16:59

## 2021-01-05 RX ADMIN — CARVEDILOL 3.12 MG: 3.12 TABLET, FILM COATED ORAL at 08:51

## 2021-01-05 RX ADMIN — LISINOPRIL 5 MG: 10 TABLET ORAL at 08:51

## 2021-01-05 NOTE — PLAN OF CARE
Problem: Falls - Risk of:  Goal: Will remain free from falls  Description: Will remain free from falls  1/5/2021 0255 by Elizabeth Tolentino RN  Outcome: Ongoing  Note: Patient will remain free from falls this shift, call light is within reach, bed in locked and lowest position. Side rails up x2. Encouraged to call for assistance with transferring. Will continue to monitor.        Problem: Bleeding:  Goal: Will show no signs and symptoms of excessive bleeding  Description: Will show no signs and symptoms of excessive bleeding  Outcome: Ongoing  Note: Requiring heparin gtt r/t recent b/l PE's  No signs of bleeding/brusing at this time  Anti-Xa lab draws ordered Q6

## 2021-01-05 NOTE — CARE COORDINATION
Case Management Initial Discharge Plan  Mal Kevin,         Readmission Risk              Risk of Unplanned Readmission:        10             Met with:patient to discuss discharge plans. Information verified: address, contacts, phone number, , insurance Yes  PCP: No primary care provider on file. Date of last visit: NA    Insurance Provider: Medicare/Medicaid    Discharge Planning  Current Residence:  Home alone mobile home  Living Arrangements:      Home has 1 stories/3 stairs to climb into home  Support Systems:     Current Services PTA:  na Agency: na  Patient able to perform ADL's:Independent  DME in home:  na  DME used to aid ambulation prior to admission:   na  DME used during admission:  na    Potential Assistance Needed:       Pharmacy:   [address] Medications:     Does patient want to participate in local refill/ meds to beds program?       Patient agreeable to home care: No  [place] of choice provided:  n/a      Type of Home Care Services:     Patient expects to be discharged to:       Prior SNF/Rehab Placement and Facility: na  Agreeable to SNF/Rehab: No  [place] of choice provided: n/a   Evaluation: n/a    Expected Discharge date:   2021  Follow Up Appointment: Best Day/ Time:      Transportation provider: dtr  Transportation arrangements needed for discharge: No    Discharge Plan: Pt lives home alone, independent, drives, no DME. Needs PCP-appt set up with [facility]  @9am. Need to follow for NOAC. Pt does not have a PCP, never had a reason to go, goes to [facility] for any medical needs. She would like to get an appt with Dr. Gabrielle Elmore as her dtr sees her and likes her. Called Dr. Francisco Alston office she is not accepting any new patients per her staff office and directed writer to go thru centralized scheduling.      Called central scheduling spoke with Palmira Favre- Dr. Nolberto Reyes (shares office with Dr. Ene Martinez) does not have an opening until Feb 19. They do not do televisits for first time appt either. Tried next closest to pt address -appt made with [facility] [address] Jan[address] 20, 2021 @ [address].  Discussed with pt she agrees    Perfect serve to Dr. Afua Isidro and PT is now requesting madelaine rolling walker and tub transfer bench     Electronically signed by Aurora Patino RN on 1/5/21 at 12:00 PM EST

## 2021-01-05 NOTE — PROGRESS NOTES
Nutrition Education    · Verbally reviewed information with PATIENT  · Educated and gave handouts on Heart Failure Nutrition therapy: low sodium diet, limiting fluid intake and monitoring weight for fluids, reading labels for sodium. · Written educational materials provided. · Contact name and number provided. · Refer to Patient Education activity for more details.       Eric Link RD, LD  Office phone [phone]

## 2021-01-05 NOTE — PLAN OF CARE
Problem: Falls - Risk of:  Goal: Will remain free from falls  Description: Will remain free from falls  1/5/2021 1227 by Trent Foss RN  Outcome: Ongoing  Note: Siderails up x 2  Hourly rounding  Call light in reach  Instructed to call for assist before attempting out of bed. Remains free from falls and accidental injury at this time   Floor free from obstacles  Bed is locked and in lowest position  Adequate lighting provided  Bed alarm on, [place] star and Stay with Me signs posted  1/5/2021 0255 by Loki Garcia RN  Outcome: Ongoing  Note: Patient will remain free from falls this shift, call light is within reach, bed in locked and lowest position. Side rails up x2. Encouraged to call for assistance with transferring. Will continue to monitor. 1/5/2021 0255 by Loki Garcia RN  Outcome: Ongoing  Goal: Absence of physical injury  Description: Absence of physical injury  Outcome: Ongoing     Problem: Bleeding:  Goal: Will show no signs and symptoms of excessive bleeding  Description: Will show no signs and symptoms of excessive bleeding  1/5/2021 1227 by Trent Foss RN  Outcome: Ongoing  Note: No signs of bleeding noted at this time.   1/5/2021 0255 by Loki Garcia RN  Outcome: Ongoing  Note: Requiring heparin gtt r/t recent b/l Pe's  No signs of bleeding/brusing at this time  Anti-Xa lab draws ordered Q6

## 2021-01-05 NOTE — PROGRESS NOTES
Anti Xa- 0.58  2nd consecutive therapeutic value- No bolus/No change in gtt   Next blood draw scheduled in 24 hours

## 2021-01-05 NOTE — PROGRESS NOTES
unspecified pulmonary embolism type, unspecified whether acute cor pulmonale present ([facility] [address] has no past medical history on file. has a past surgical history that includes Knee arthroscopy. Restrictions  Restrictions/Precautions  Restrictions/Precautions: General Precautions, Fall Risk, Up as Tolerated  Vision/Hearing  Vision: Impaired  Vision Exceptions: Wears glasses at all times  Hearing: Within functional limits     Subjective  General  Chart Reviewed: Yes  Patient assessed for rehabilitation services?: Yes  Family / Caregiver Present: No  Follows Commands: Within Functional Limits  Subjective  Subjective: Reports no pain at time of eval  Pain Screening  Patient Currently in Pain: Denies          Orientation  Orientation  Overall Orientation Status: Within Normal Limits  Social/Functional History  Social/Functional History  Lives With: Alone  Type of Home: Trailer  Home Layout: One level  Home Access: Stairs to enter with rails  Entrance Stairs - Number of Steps: 3-4  Entrance Stairs - Rails: Both  Bathroom Shower/Tub: Tub/Shower unit  Bathroom Toilet: Standard  Home Equipment: (no DME)  ADL Assistance: Independent  Homemaking Assistance: Independent  Ambulation Assistance: Independent  Transfer Assistance: Independent  Active : Yes  Occupation: Retired  Type of occupation: factory, [employer]  Leisure & Hobbies: jero- blankets for [employer] and dialysis pts. Additional Comments: Reports no falls  Cognition   Cognition  Overall Cognitive Status: WNL    Objective     Observation/Palpation  Posture: Good  Observation: 2L O2 (does not wear at home); SpO2 94% at rest, 2L O2;  IV. Monitored O2 sats-pt dropping with activity and needs O2 on (pt found in room with nasal canula out of nose).     AROM RLE (degrees)  RLE AROM: WFL  AROM LLE (degrees)  LLE AROM : WFL  AROM RUE (degrees)  RUE AROM : WFL  AROM LUE (degrees)  LUE AROM : WFL  Strength RLE  Strength RLE: WFL  Strength LLE  Strength LLE: WFL  Strength RUE  Strength RUE: WFL  Strength LUE  Strength LUE: WFL  Tone RLE  RLE Tone: Normotonic  Tone LLE  LLE Tone: Normotonic  Sensation  Overall Sensation Status: WNL  Bed mobility  Supine to Sit: Independent  Transfers  Sit to Stand: Contact guard assistance(wide TOI)  Comment: initial standing balance somewhat unsteady- pt. reaching with UE for support  Ambulation  Ambulation?: Yes  Ambulation 1  Surface: level tile  Device: No Device;Rolling Walker  Assistance: Contact guard assistance  Quality of Gait: Pt. amb. without device to bathroom, 15ft. - lateral wt. shift with wide TOI and pt. reaching out for objects for support, 2L O2. Pt. then amb. on room air with RW and much more steady. Pt. leaned heavily on RW and stated it made her feel more secure/ conserve some energy. Distance: 15ft. 2L O2 with SpO2 in low 90s as long as pt. focusing on breathing. Pt. with mentality that she does not need her supplemental O2. Allowed pt. to amb. 40ft. with RW and when seated, SpO2 80%. O2 placed back on amb. pt. returned to 90s in less than one min. Pt. able to see for herself that she does need O2 at this time. Reported to RN. Comments: up to chair on 2L O2. Balance  Posture: Good  Sitting - Static: Good  Sitting - Dynamic: Good  Standing - Static: Good;-(wide TOI)        Plan   Plan  Times per week: 1-2x/day; 5-6days/wk  Current Treatment Recommendations: Strengthening, ROM, Balance Training, Functional Mobility Training, Transfer Training, Gait Training, Endurance Training, Patient/Caregiver Education & Training, Safety Education & Training, Home Exercise Program  Safety Devices  Type of devices: All fall risk precautions in place, Gait belt, Bed alarm in place, Patient at risk for falls, Call light within reach, Left in chair, Nurse notified            Goals  Short term goals  Time Frame for Short term goals: 12 visits:  Short term goal 1: Pt. to be indep with sit to stand transfers with approp.  O2

## 2021-01-05 NOTE — PROGRESS NOTES
Yes  Family / Caregiver Present: No    Social/Functional History  Social/Functional History  Lives With: Alone  Type of Home: Trailer  Home Layout: One level  Home Access: Stairs to enter with rails  Entrance Stairs - Number of Steps: 3-4  Entrance Stairs - Rails: Both  Bathroom Shower/Tub: Tub/Shower unit  Bathroom Toilet: Standard  Home Equipment: (no DME)  ADL Assistance: Independent  Homemaking Assistance: Independent  Ambulation Assistance: Independent  Transfer Assistance: Independent  Active : Yes  Occupation: Retired  Type of occupation: factory,   Leisure & Hobbies: jero- blankets for [employer] and dialysis pts. Additional Comments: Reports no falls       Objective   Vision: Impaired  Vision Exceptions: Wears glasses at all times  Hearing: Within functional limits    Orientation  Overall Orientation Status: Within Functional Limits  Observation/Palpation  Posture: Good  Observation: 2L O2 (does not wear at home); SpO2 94% at rest, 2L O2;  IV. Monitored O2 sats-pt dropping with activity and needs O2 on (pt found in room with nasal canula out of nose). Balance  Sitting Balance: Independent  Standing Balance: Contact guard assistance  Functional Mobility  Functional - Mobility Device: Rolling Walker  Activity: To/from bathroom  Assist Level: Contact guard assistance  Functional Mobility Comments: pt initially completing functional mob in room to bathroom without AD; CGA for safety and assist to manage lines, O2 tubing, etc. Pt reaching for wall/bed to stabilize and SOB noted. Following completion of grooming tasks, pt provided a walker. Does better with support; SBA. Taking O2 off, pt dropped to 80%; 2L reapplied and pt able to fairly quickly recover with utilization of pursed lip breathing. Education provided on EC/WS techs and use of AE/DME to assist with pacing and mitigating SOB.   Toilet Transfers  Equipment Used: Standard toilet  Toilet Transfer: Stand by assistance  ADL  Feeding: and tips during bathing/showering, and toileting; EC/WS techniques of pacing, posturing, body mechanics, planning and organizing tasks, avoiding fatigue, and task simplification in regards to ADLs of eating, grooming, bathing/showering, dressing, and IADLs of cooking, meal cleanup, marketing and meal planning, laundry, bed making, and housework.         Ro Pollock, OT

## 2021-01-05 NOTE — PROGRESS NOTES
[facility]  Office: [address], DO, Sandrine Villalpando, DO, Niesha Dimas, DO, Nickie Novak Blood, DO, Saranya Guadalupe MD, Zahida Saavedra MD, Jeannine Piedra MD, Freddie Major MD, Meghan Christine MD, Quin Vu MD, Yeison Burns MD, Jade Guerrero MD, Weston Oh MD, Hernan Mae, DO, Osmar Nguyen MD, Jessica Holley MD, Mohsen Damico, DO, Saima Verma MD,  Mis Walters, DO, Yoanna Kim MD, Davide Landeros MD, Isidro Treviño, CNP, [facility], CNP, Guillermo Crow, CNP, Bhavna Hicks, CNS, Bk Coronel, CNP, Brigitte Larson, CNP, Ivet Galarza, CNP, Libby Washington, CNP, Addie Rdz, CNP, Saba Renae PA-C, Narcisa Iglesias, [facility], Namrata Gallego, CNP, Guerline Orona, CNP, Jose Pardo, CNP, Luis King, CNP, Rosmery Linton, CNP         [address]    Progress Note    1/5/2021    11:27 AM    Name:   Taniya Barnes  MRN:     [id number]     [place]:      [de-identified]   Room:   [address] [place] [address] Day:  2  Admit Date:  1/3/2021  4:56 PM    PCP:   No primary care provider on file. Code Status:  Full Code    Subjective:     C/C:   Chief Complaint   Patient presents with    Shortness of Breath     Interval History Status:   Feels her breathing is better than before  Venous Doppler for lower extremities have been done, not reported yet  She is still on heparin drip    Brief History:   As documented in the medical record:  April Hector a 65 y.o. Non-/non  female who presents with Shortness of Breath   and is admitted to the hospital for the management of Bilateral pulmonary embolism ([facility] [address]    The patient presents to the hospital with complaint of shortness of breath. She reports her symptoms started 7-10 days ago and have progressively worsened. She states that she becomes extremely short of breath with minimal activity. She is able to catch her breath if she rests.  She also endorses swelling to her bilateral lower effusion seen. Normal aortic root dimension.      Initial EKG revealed:  Sinus tachycardia  Otherwise normal ECG  No previous ECGs available      Review of Systems:     Constitutional:  negative for chills, fevers, sweats  Respiratory:  negative for cough,+ dyspnea on exertion-improved than before,   Cardiovascular:  negative for chest pain, chest pressure/discomfort, lower extremity edema, palpitations  Gastrointestinal:  negative for abdominal pain, constipation, diarrhea, nausea, vomiting  Neurological:  negative for dizziness, headache    Medications: Allergies:  No Known Allergies    Current Meds:   Scheduled Meds:    sodium chloride flush  10 mL Intravenous 2 times per day    lisinopril  5 mg Oral Daily    carvedilol  3.125 mg Oral BID WC     Continuous Infusions:    heparin (PORCINE) Infusion 11.102 Units/kg/hr (21 0617)     PRN Meds: sodium chloride flush, heparin (porcine), heparin (porcine), sodium chloride flush, nicotine, promethazine **OR** ondansetron, polyethylene glycol, acetaminophen **OR** acetaminophen    Data:     Past Medical History:   has no past medical history on file. Social History:   reports that she has never smoked. She has never used smokeless tobacco. She reports that she does not drink alcohol or use drugs. Family History:   Family History   Problem Relation Age of Onset    Cancer Mother     Kidney Disease Father        Vitals:  BP (!) 145/94   Pulse 92   Temp 98.2 °F (36.8 °C)   Resp 22   Wt 274 lb 6.4 oz (124.5 kg)   SpO2 94%   Temp (24hrs), Av.6 °F (36.4 °C), Min:97.3 °F (36.3 °C), Max:98.2 °F (36.8 °C)    No results for input(s): POCGLU in the last 72 hours. I/O (24Hr):     Intake/Output Summary (Last 24 hours) at 2021 1127  Last data filed at 2021 0857  Gross per 24 hour   Intake --   Output 550 ml   Net -550 ml       Labs:  Hematology:  Recent Labs     21  1711 21  0906 21  0401   WBC 6.8 7.2 6.5   RBC 4.21 3.76* 3.77*   HGB 12.1 11.0* 10.7*   HCT 41.5 35.1* 35.1*   MCV 98.6 93.4 93.1   MCH 28.7 29.3 28.4   MCHC 29.2 31.3 30.5   RDW 12.6 12.7 12.7    148 157   MPV 11.4 11.1 11.1   CRP 22.7*  --   --    INR 1.1  --   --      Chemistry:  Recent Labs     01/03/21  1711 01/03/21  1911 01/03/21  2249 01/04/21  0320 01/04/21  0906     --   --   --   --    K 3.5*  --   --   --   --      --   --   --   --    CO2 18*  --   --   --   --    GLUCOSE 155*  --   --   --   --    BUN 19  --   --   --   --    CREATININE 0.85  --   --   --   --    MG  --   --   --   --  1.8   ANIONGAP 14  --   --   --   --    LABGLOM >60  --   --   --   --    GFRAA >60  --   --   --   --    CALCIUM 8.9  --   --   --   --    PROBNP 6,225*  --   --   --  3,290*   TROPHS 28* 26* 25* 26*  --    MYOGLOBIN 32 25  --   --   --      Recent Labs     01/04/21  0906   TSH <0.01*   CHOL 138   HDL 48   LDLCHOLESTEROL 68   CHOLHDLRATIO 2.9   TRIG 108   VLDL NOT REPORTED     ABG:No results found for: POCPH, PHART, PH, POCPCO2, NWE6ATZ, PCO2, POCPO2, PO2ART, PO2, POCHCO3, VUO5CVR, HCO3, NBEA, PBEA, BEART, BE, THGBART, THB, LYF6GVL, MNYL4XBX, N1GXPVCM, O2SAT, FIO2  No results found for: SPECIAL  No results found for: CULTURE    Radiology:  Xr Chest Portable    Result Date: 1/4/2021  No acute cardiopulmonary findings     Xr Chest Portable    Result Date: 1/3/2021  No acute cardiopulmonary disease     Ct Chest Pulmonary Embolism W Contrast    Result Date: 1/3/2021  Fairly extensive bilateral pulmonary emboli as described above Cholelithiasis Critical results were called by Dr. Guerline Orona MD to Tomasa Iraheta on 1/3/2021 at 18:09.        Physical Examination:        General appearance:  alert, cooperative and no distress, obese  Mental Status:  oriented to person, place and time and normal affect  Lungs:  clear to auscultation bilaterally, normal effort  Heart:  regular rate and rhythm, no murmur  Abdomen:  soft, nontender, nondistended, normal bowel sounds, no masses, hepatomegaly, splenomegaly  Extremities:  + edema, no redness, tenderness in the calves  Skin:  no gross lesions, rashes, induration    Assessment:        Hospital Problems           Last Modified POA    * (Principal) Bilateral pulmonary embolism ([address] 1/3/2021 Yes    Obesity due to excess calories without serious comorbidity 1/3/2021 Yes    Troponin level elevated 1/4/2021 Yes    Mild pulmonary hypertension ([address] 1/4/2021 Yes    Anemia, normocytic normochromic 1/4/2021 Yes    Elevated blood pressure readings 1/4/2021 Yes    Acute pulmonary embolism ([address] 1/4/2021 Yes          Plan:        1. Follow-up venous Doppler results  2. We will plan transitioning to NOAC after above results  3.  Discharge plan for later today or tomorrow morning    Toni Javier MD  1/5/2021  11:27 AM

## 2021-01-06 VITALS
OXYGEN SATURATION: 99 % | RESPIRATION RATE: 18 BRPM | BODY MASS INDEX: 44.26 KG/M2 | DIASTOLIC BLOOD PRESSURE: 78 MMHG | HEART RATE: 78 BPM | HEIGHT: 66 IN | WEIGHT: 275.4 LBS | SYSTOLIC BLOOD PRESSURE: 119 MMHG | TEMPERATURE: 97.9 F

## 2021-01-06 LAB
ANTI-XA UNFRAC HEPARIN: 0.59 IU/L (ref 0.3–0.7)
BNP INTERPRETATION: ABNORMAL
PRO-BNP: 1485 PG/ML

## 2021-01-06 PROCEDURE — 2580000003 HC RX 258: Performed by: NURSE PRACTITIONER

## 2021-01-06 PROCEDURE — 6370000000 HC RX 637 (ALT 250 FOR IP): Performed by: NURSE PRACTITIONER

## 2021-01-06 PROCEDURE — 99239 HOSP IP/OBS DSCHRG MGMT >30: CPT | Performed by: INTERNAL MEDICINE

## 2021-01-06 PROCEDURE — 6370000000 HC RX 637 (ALT 250 FOR IP): Performed by: INTERNAL MEDICINE

## 2021-01-06 PROCEDURE — 85520 HEPARIN ASSAY: CPT

## 2021-01-06 PROCEDURE — 36415 COLL VENOUS BLD VENIPUNCTURE: CPT

## 2021-01-06 PROCEDURE — 83880 ASSAY OF NATRIURETIC PEPTIDE: CPT

## 2021-01-06 RX ORDER — CARVEDILOL 3.12 MG/1
3.12 TABLET ORAL 2 TIMES DAILY WITH MEALS
Qty: 60 TABLET | Refills: 1 | Status: SHIPPED | OUTPATIENT
Start: 2021-01-06 | End: 2021-03-04

## 2021-01-06 RX ORDER — LISINOPRIL 5 MG/1
5 TABLET ORAL DAILY
Qty: 30 TABLET | Refills: 1 | Status: SHIPPED | OUTPATIENT
Start: 2021-01-07 | End: 2021-04-08

## 2021-01-06 RX ADMIN — LISINOPRIL 5 MG: 10 TABLET ORAL at 08:36

## 2021-01-06 RX ADMIN — Medication 10 ML: at 08:36

## 2021-01-06 RX ADMIN — RIVAROXABAN 15 MG: 15 TABLET, FILM COATED ORAL at 08:51

## 2021-01-06 RX ADMIN — RIVAROXABAN 15 MG: 15 TABLET, FILM COATED ORAL at 16:14

## 2021-01-06 RX ADMIN — CARVEDILOL 3.12 MG: 3.12 TABLET, FILM COATED ORAL at 16:14

## 2021-01-06 RX ADMIN — CARVEDILOL 3.12 MG: 3.12 TABLET, FILM COATED ORAL at 08:35

## 2021-01-06 ASSESSMENT — PAIN SCALES - GENERAL: PAINLEVEL_OUTOF10: 0

## 2021-01-06 NOTE — PLAN OF CARE
Problem: Falls - Risk of:  Goal: Will remain free from falls  Description: Will remain free from falls  1/6/2021 1359 by Sharif Villasenor RN  Outcome: Ongoing  Note: Siderails up x 2  Hourly rounding  Call light in reach  Instructed to call for assist before attempting out of bed.   Remains free from falls and accidental injury at this time   Floor free from obstacles  Bed is locked and in lowest position  Adequate lighting provided  Bed alarm on, [place] star and Stay with Me signs posted      1/6/2021 0018 by Josue Hart RN  Outcome: Ongoing  Goal: Absence of physical injury  Description: Absence of physical injury  Outcome: Ongoing     Problem: Bleeding:  Goal: Will show no signs and symptoms of excessive bleeding  Description: Will show no signs and symptoms of excessive bleeding  1/6/2021 1359 by Sharif Villasenor RN  Outcome: Ongoing  1/6/2021 0018 by Josue Hart RN  Outcome: Ongoing  Note:   Requiring heparin gtt r/t recent b/l PE's  No signs of bleeding/brusing at this time  Anti-Xa draw at 0400

## 2021-01-06 NOTE — CARE COORDINATION
DC Planning    Called Xarelto 15 mg po bid x 21 days then 20 mg daily [facility]) she will call back with trena [place] spoke with Angelique Rangel will keep Xarelto 20 mg po daily on profile

## 2021-01-06 NOTE — PROGRESS NOTES
CLINICAL PHARMACY NOTE: MEDS TO [address] Select Patient?: No  Total # of Prescriptions Filled: 4   The following medications were delivered to the patient:  · XARELTO STARTER PACK  · XARELTO 20 MG TABS  · LISINOPRIL 5 MG TABS  · CARVEDILOL 3.125 MG TABS  Total # of Interventions Completed: 1  Time Spent (min): 5    Additional Documentation:  DELIVERED TO PT'S ROOM 1/6/21. $13.20 COPAY, PT'S DAUGHTER PAID FOR IT OVER THE PHONE.

## 2021-01-06 NOTE — PROGRESS NOTES
[facility]  Office: [address], DO, Quan Saldana, DO, Dontae Mo, DO, Neisha Cortney Blood, DO, Chase Mckeon MD, Brooke Brooks MD, Fortino Hubbard MD, Ute Stovall MD, Rob Suh MD, Erica Waters MD, Parvin Goldman MD, Erica Johnson MD, Weston Espino MD, Terry Salinas, DO, Suha Dewitt MD, Harjinder Escamilla MD, Alvaro Diaz DO, Rosy Langley MD,  Jm Britton DO, Mackenzie Benítez MD, Ros Interiano MD, Antonio Marquez, [facility], [facility] Anika, CNP, Surinder Deleon, CNP, Kailyn Grant, CNS, Michael Monroy, [facility], Raynell Councilman, CNP, Tacho Ratliff, CNP, Nile Brown, CNP, Antoinette Boyd, CNP, Amy Godfrey PA-C, Apple Bingham, [facility], Galen Wilson, CNP, Betty Magallanes, CNP, Jayjay Gurrola, CNP, Kirk Wilson, CNP, Faith Weiner, [facility]   [address]    Progress Note    1/6/2021    1:47 PM    Name:   Jennie Bruce  MRN:     [id number]     [place]:      [de-identified]   Room:   [facility] Day:  3  Admit Date:  1/3/2021  4:56 PM    PCP:   No primary care provider on file. Code Status:  Full Code    Subjective:     C/C:   Chief Complaint   Patient presents with    Shortness of Breath     Interval History Status:   Feels her breathing is better than before  Venous Doppler for lower extremities is negative for DVT   She is still on oxygen-3 L  She is being switched to oral Xarelto from heparin drip which is covered by her insurance  Brief History:   As documented in the medical record:  Teagan Deras a 65 y.o. Non-/non  female who presents with Shortness of Breath   and is admitted to the hospital for the management of Bilateral pulmonary embolism ([facility] [address]    The patient presents to the hospital with complaint of shortness of breath. She reports her symptoms started 7-10 days ago and have progressively worsened. She states that she becomes extremely short of breath with minimal activity.  She is able to catch her breath if she rests. She also endorses swelling to her bilateral lower extremities and chest heaviness with activity. She denies fever, chills, nausea, vomiting. No additional symptomology or definitive modifying factors. She denies past cardiac history or previous history of PE/DVT.    She visited the [facility] urgent care on [address] today. They completed a CXR and stated that she was fluid overloaded. They also conducted a COVID-19 swab that was negative. Records have been requested.      EKG shows sinus tachycardia without overt ST elevation or depression.      Potassium 3.5, CRP 22.7, pro BNP 6,225, high sensitivity troponin 28, 26, WBC 6.8.     CT chest with contrast indicates fairly extensive bilateral pulmonary emboli as described in radiological report. Cholelithiasis.     Initial plan included:  Patient status inpatient in the  Medical ICU   1. EKG, CXR and CT reviewed. 2. Echocardiogram.  3. High intensity heparin drip. 4. Initiate carvedilol & lisinopril. 5. Monitor vital signs. 6. Follow chemistries. 7. Check lipids, TSH.   8. Supplemental oxygen as needed. 9. Urgent care records including negative COVID-19 results requested. 10. Obesity- lifestyle modifications. 11. Cardiac diet. 12. Activity as tolerated with assistance.   Plan of care discussed with patient and patient's daughter Aleksandra Lopez) at bedside.  \"      The patient does acknowledge a 3-hour road trip in her car during the holidays  No prior history of DVT or hypercoagulable state  Family history negative     Subsequent database has included:  Kbnlhymmqk21.0Low      Troponin, High [id number]      Echo:  Left ventricle is normal in size   Global left ventricular systolic function is normal   Estimated ejection fraction is 60 % . Left atrium is mildly dilated. Right atrium is mildly dilated . Right ventricular dilatation with normal systolic function. TAPSE value of 1.88cm noted.    Mild to moderate tricuspid regurgitation. Mild pulmonary hypertension. No pericardial effusion seen. Normal aortic root dimension.      Initial EKG revealed:  Sinus tachycardia  Otherwise normal ECG  No previous ECGs available      Review of Systems:     Constitutional:  negative for chills, fevers, sweats  Respiratory:  negative for cough,+ dyspnea on exertion-improved than before,   Cardiovascular:  negative for chest pain, chest pressure/discomfort, lower extremity edema, palpitations  Gastrointestinal:  negative for abdominal pain, constipation, diarrhea, nausea, vomiting  Neurological:  negative for dizziness, headache    Medications: Allergies:  No Known Allergies    Current Meds:   Scheduled Meds:    rivaroxaban  15 mg Oral BID WC    sodium chloride flush  10 mL Intravenous 2 times per day    lisinopril  5 mg Oral Daily    carvedilol  3.125 mg Oral BID WC     Continuous Infusions:     PRN Meds: sodium chloride flush, sodium chloride flush, nicotine, promethazine **OR** ondansetron, polyethylene glycol, acetaminophen **OR** acetaminophen    Data:     Past Medical History:   has no past medical history on file. Social History:   reports that she has never smoked. She has never used smokeless tobacco. She reports that she does not drink alcohol or use drugs. Family History:   Family History   Problem Relation Age of Onset    Cancer Mother     Kidney Disease Father        Vitals:  /65   Pulse 77   Temp 97.7 °F (36.5 °C) (Oral)   Resp 18   Ht 5' 6\" (1.676 m)   Wt 275 lb 6.4 oz (124.9 kg)   SpO2 96%   BMI 44.45 kg/m²   Temp (24hrs), Av.9 °F (36.6 °C), Min:97.3 °F (36.3 °C), Max:98.4 °F (36.9 °C)    No results for input(s): POCGLU in the last 72 hours. I/O (24Hr):     Intake/Output Summary (Last 24 hours) at 2021 1347  Last data filed at 2021 0602  Gross per 24 hour   Intake 617.63 ml   Output 1500 ml   Net -882.37 ml       Labs:  Hematology:  Recent Labs     21  9641 01/04/21  0906 01/05/21  0401   WBC 6.8 7.2 6.5   RBC 4.21 3.76* 3.77*   HGB 12.1 11.0* 10.7*   HCT 41.5 35.1* 35.1*   MCV 98.6 93.4 93.1   MCH 28.7 29.3 28.4   MCHC 29.2 31.3 30.5   RDW 12.6 12.7 12.7    148 157   MPV 11.4 11.1 11.1   CRP 22.7*  --   --    INR 1.1  --   --      Chemistry:  Recent Labs     01/03/21  1711 01/03/21  1911 01/03/21  2249 01/04/21  0320 01/04/21  0906 01/06/21  0400     --   --   --   --   --    K 3.5*  --   --   --   --   --      --   --   --   --   --    CO2 18*  --   --   --   --   --    GLUCOSE 155*  --   --   --   --   --    BUN 19  --   --   --   --   --    CREATININE 0.85  --   --   --   --   --    MG  --   --   --   --  1.8  --    ANIONGAP 14  --   --   --   --   --    LABGLOM >60  --   --   --   --   --    GFRAA >60  --   --   --   --   --    CALCIUM 8.9  --   --   --   --   --    PROBNP 6,225*  --   --   --  3,290* 1,485*   TROPHS 28* 26* 25* 26*  --   --    MYOGLOBIN 32 25  --   --   --   --      Recent Labs     01/04/21  0906   TSH <0.01*   CHOL 138   HDL 48   LDLCHOLESTEROL 68   CHOLHDLRATIO 2.9   TRIG 108   VLDL NOT REPORTED     ABG:No results found for: POCPH, PHART, PH, POCPCO2, DFB3RGQ, PCO2, POCPO2, PO2ART, PO2, POCHCO3, WSS2VJI, HCO3, NBEA, PBEA, BEART, BE, THGBART, THB, NSF4FLE, DUPY8UMI, H4FJJHMO, O2SAT, FIO2  No results found for: SPECIAL  No results found for: CULTURE    Radiology:  Xr Chest Portable    Result Date: 1/4/2021  No acute cardiopulmonary findings     Xr Chest Portable    Result Date: 1/3/2021  No acute cardiopulmonary disease     Ct Chest Pulmonary Embolism W Contrast    Result Date: 1/3/2021  Fairly extensive bilateral pulmonary emboli as described above Cholelithiasis Critical results were called by Dr. Elizabeth Ackerman MD to [facility] on 1/3/2021 at 18:09.        Physical Examination:        General appearance:  alert, cooperative and no distress, obese  Mental Status:  oriented to person, place and time and normal affect  Lungs:  clear to auscultation bilaterally, normal effort  Heart:  regular rate and rhythm, no murmur  Abdomen:  soft, nontender, nondistended, normal bowel sounds, no masses, hepatomegaly, splenomegaly  Extremities:  + edema, no redness, tenderness in the calves  Skin:  no gross lesions, rashes, induration    Assessment:        Hospital Problems           Last Modified POA    * (Principal) Bilateral pulmonary embolism ([address] 1/3/2021 Yes    Obesity due to excess calories without serious comorbidity 1/3/2021 Yes    Troponin level elevated 1/4/2021 Yes    Mild pulmonary hypertension ([address] 1/4/2021 Yes    Anemia, normocytic normochromic 1/4/2021 Yes    Elevated blood pressure readings 1/4/2021 Yes    Acute pulmonary embolism ([address] 1/4/2021 Yes          Plan:        1. Discontinue heparin drip  2. Start Xarelto 15 mg twice daily for 21 days followed by 20 mg daily till seen by PCP for further coordination of therapy  3. Home oxygen evaluation-recommended 3 L oxygen on exertion  4. DME for rolling walker and tub transfer bench  5. Discharge home today      DME walker  Myrna Garcia was evaluated today and a DME order was entered for a wheeled walker because she requires this to successfully complete daily living tasks of personal cares and ambulating. A wheeled walker is necessary due to the patient's unsteady gait, upper body weakness, and inability to  an ambulation device; and she can ambulate only by pushing a walker instead of a lesser assistive device such as a cane, crutch, or standard walker. The need for this equipment was discussed with the patient and she understands and is in agreement. DME transfer tub bench  Myrna Garcia requires a transfer tub bench due to being confined to one level of the home, and is physically incapable of utilizing regular toilet facilities. Current body weight is Weight: 275 lb 6.4 oz (124.9 kg).     DME oxygen  Patient was evaluated today for the diagnosis of COPD and Bilateral pulmonary embolism. I entered a DME order for home oxygen because the diagnosis and testing requires the patient to have supplemental oxygen. Condition will improve or be benefited by oxygen use. The patient is  able to perform good mobility in a home setting and therefore does require the use of a portable oxygen system. The need for this equipment was discussed with the patient and she understands and is in agreement.         Time spent more than 45 minutes on all above    Abisai Michele MD  1/6/2021  1:47 PM

## 2021-01-06 NOTE — PROGRESS NOTES
Home Oxygen Evaluation    Home Oxygen Evaluation completed by [facility].    Patient is on 3 liters per minute via NC.   Resting SpO2 = 96-98%  Resting SpO2 on room air = 93-94%    SpO2 on room air with exercise = 85-87%  SpO2 on oxygen as above with exercise = 90-92%      Deo Patel  2:10 PM

## 2021-01-06 NOTE — DISCHARGE SUMMARY
hospital with complaint of shortness of breath. She reports her symptoms started 7-10 days ago and have progressively worsened. She states that she becomes extremely short of breath with minimal activity. She is able to catch her breath if she rests. She also endorses swelling to her bilateral lower extremities and chest heaviness with activity. She denies fever, chills, nausea, vomiting. No additional symptomology or definitive modifying factors. She denies past cardiac history or previous history of PE/DVT.    She visited the [facility] urgent care on [address] [address] [facility] [address] today. They completed a CXR and stated that she was fluid overloaded. They also conducted a COVID-19 swab that was negative. Records have been requested.      EKG shows sinus tachycardia without overt ST elevation or depression.      Potassium 3.5, CRP 22.7, pro BNP 6,225, high sensitivity troponin 28, 26, WBC 6.8.     CT chest with contrast indicates fairly extensive bilateral pulmonary emboli as described in radiological report. Cholelithiasis.     Initial plan included:  Patient status inpatient in the  Medical ICU   1. EKG, CXR and CT reviewed. 2. Echocardiogram.  3. High intensity heparin drip. 4. Initiate carvedilol & lisinopril. 5. Monitor vital signs. 6. Follow chemistries. 7. Check lipids, TSH.   8. Supplemental oxygen as needed. 9. Urgent care records including negative COVID-19 results requested. 10. Obesity- lifestyle modifications. 11. Cardiac diet.   12. Activity as tolerated with assistance.   Plan of care discussed with patient and patient's daughter Guero Segundo) at bedside.  \"      The patient does acknowledge a 3-hour road trip in her car during the holidays  No prior history of DVT or hypercoagulable state  Family history negative     Subsequent database has included:  Sqwrgmltwy41.0Low      Troponin, High Qgjohhtbnuo47Didq      Echo:  Left ventricle is normal in size   Global left ventricular systolic function is normal   Estimated ejection fraction is 60 % . Left atrium is mildly dilated. Right atrium is mildly dilated . Right ventricular dilatation with normal systolic function. TAPSE value of 1.88cm noted. Mild to moderate tricuspid regurgitation. Mild pulmonary hypertension. No pericardial effusion seen. Normal aortic root dimension.      Initial EKG revealed:  Sinus tachycardia  Otherwise normal ECG  No previous ECGs available     Hospital Course:    She was kept on heparin drip, was given supportive treatment  Feels her breathing is better than before  Venous Doppler for lower extremities is negative for DVT   She is still on oxygen-3 L  She is being switched to oral Xarelto from heparin drip which is covered by her insurance    Plan:        -   Start Xarelto 15 mg twice daily for 21 days followed by 20 mg daily till seen by PCP for further coordination of therapy  -    Home oxygen evaluation-recommended 3 L oxygen on exertion  -    DME for rolling walker and tub transfer bench  -    Follow-up with PCP within 1 week  -    Discharge home today        DME walker  Natalio Beasley was evaluated today and a DME order was entered for a wheeled walker because she requires this to successfully complete daily living tasks of personal cares and ambulating. A wheeled walker is necessary due to the patient's unsteady gait, upper body weakness, and inability to  an ambulation device; and she can ambulate only by pushing a walker instead of a lesser assistive device such as a cane, crutch, or standard walker.   The need for this equipment was discussed with the patient and she understands and is in agreement.     DME transfer tub bench  Natalio Beasley requires a transfer tub bench due to being confined to one level of the home, and is physically incapable of utilizing regular toilet facilities.  Current body weight is Weight: 275 lb 6.4 oz (124.9 kg).     DME oxygen  Patient was evaluated today for the diagnosis of COPD and Bilateral pulmonary embolism. I entered a DME order for home oxygen because the diagnosis and testing requires the patient to have supplemental oxygen. Condition will improve or be benefited by oxygen use. The patient is  able to perform good mobility in a home setting and therefore does require the use of a portable oxygen system.   The need for this equipment was discussed with the patient and she understands and is in agreement.         Significant therapeutic interventions: See above notes    Significant Diagnostic Studies:   Labs / Micro:  CBC:   Lab Results   Component Value Date    WBC 6.5 01/05/2021    RBC 3.77 01/05/2021    HGB 10.7 01/05/2021    HCT 35.1 01/05/2021    MCV 93.1 01/05/2021    MCH 28.4 01/05/2021    MCHC 30.5 01/05/2021    RDW 12.7 01/05/2021     01/05/2021     BMP:    Lab Results   Component Value Date    GLUCOSE 155 01/03/2021     01/03/2021    K 3.5 01/03/2021     01/03/2021    CO2 18 01/03/2021    ANIONGAP 14 01/03/2021    BUN 19 01/03/2021    CREATININE 0.85 01/03/2021    BUNCRER 22 01/03/2021    CALCIUM 8.9 01/03/2021    LABGLOM >60 01/03/2021    GFRAA >60 01/03/2021    GFR      01/03/2021    GFR NOT REPORTED 01/03/2021     HFP:  No results found for: ALB, PROT  CMP:    Lab Results   Component Value Date    GLUCOSE 155 01/03/2021     01/03/2021    K 3.5 01/03/2021     01/03/2021    CO2 18 01/03/2021    BUN 19 01/03/2021    CREATININE 0.85 01/03/2021    ANIONGAP 14 01/03/2021    LABGLOM >60 01/03/2021    GFRAA >60 01/03/2021    GFR      01/03/2021    GFR NOT REPORTED 01/03/2021    CALCIUM 8.9 01/03/2021     PT/INR:    Lab Results   Component Value Date    PROTIME 13.7 01/03/2021    INR 1.1 01/03/2021     PTT:   Lab Results   Component Value Date    APTT 25.4 01/03/2021     FLP:    Lab Results   Component Value Date    CHOL 138 01/04/2021    TRIG 108 01/04/2021    HDL 48 01/04/2021     U/A:  No results found for: SERGIO Ramsay, HGBUR, PHUR, PROTEINU, GLUCOSEU, KETUA, BILIRUBINUR, UROBILINOGEN, NITRU, LEUKOCYTESUR  TSH:    Lab Results   Component Value Date    TSH <0.01 01/04/2021        Radiology:  Xr Chest Portable    Result Date: 1/4/2021  No acute cardiopulmonary findings     Xr Chest Portable    Result Date: 1/3/2021  No acute cardiopulmonary disease     Ct Chest Pulmonary Embolism W Contrast    Result Date: 1/3/2021  Fairly extensive bilateral pulmonary emboli as described above Cholelithiasis Critical results were called by Dr. Buddy Valero MD to [facility] on 1/3/2021 at 18:09. Consultations:    Consults:     Final Specialist Recommendations/Findings:   IP CONSULT TO INTERNAL MEDICINE  IP CONSULT TO DIETITIAN      The patient was seen and examined on day of discharge and this discharge summary is in conjunction with any daily progress note from day of discharge. Discharge plan:     Disposition: Home    Physician Follow Up:     Cheyanne Dominique PA-C  [address]  [phone]    On 1/20/2021  Appointment time 9 am pleasebring insurance card, photo ID and mask    [address] [place]  [phone]      Oxygen and medical equipment provider       Requiring Further Evaluation/Follow Up POST HOSPITALIZATION/Incidental Findings: Continue Xarelto as recommended    Diet: cardiac diet    Activity: As tolerated    Instructions to Patient: Use oxygen as recommended during activity/ exertion    Discharge Medications:      Medication List      START taking these medications    carvedilol 3.125 MG tablet  Commonly known as: COREG  Take 1 tablet by mouth 2 times daily (with meals)     lisinopril 5 MG tablet  Commonly known as: PRINIVIL;ZESTRIL  Take 1 tablet by mouth daily  Start taking on: January 7, 2021     rivaroxaban 15 & 20 MG Starter Pack  Take as directed on package.            Where to Get Your

## 2021-01-06 NOTE — PLAN OF CARE
Problem: Falls - Risk of:  Goal: Will remain free from falls  Description: Will remain free from falls  1/6/2021 0018 by Yojana Estrada RN  Outcome: Ongoing     Problem: Bleeding:  Goal: Will show no signs and symptoms of excessive bleeding  Description: Will show no signs and symptoms of excessive bleeding  1/6/2021 0018 by Yojana Estrada RN  Outcome: Ongoing  Note:   Requiring heparin gtt r/t recent b/l PE's  No signs of bleeding/brusing at this time  Anti-Xa draw at 0400

## 2021-01-06 NOTE — CARE COORDINATION
DC Planning    Spoke with Catherine Leach from [phone] rx-Xarelto starter pack  [facility] be $9.20 per month this affordable to pt.      She would like her meds filled here-explained she will need to transfer her RXs to [facility] on the next refills and Xarelto is on profile at [employer]

## 2021-01-06 NOTE — CARE COORDINATION
DC Planning    Pt qualifies for home 02, also needs madelaine rolling walker and tub shower bench.  [facility] [address] [facility]ed face to face, face sheet, clinical documentation and orders for equipment to [facility] .

## 2021-01-07 NOTE — PROGRESS NOTES
Patient discharged via wheelchair to home with all her belongings in stable condition. Meds to bed delivered prior to discharge.  Patient understood and signed AVS.

## 2021-01-20 ENCOUNTER — OFFICE VISIT (OUTPATIENT)
Dept: FAMILY MEDICINE CLINIC | Age: 71
End: 2021-01-20
Payer: MEDICARE

## 2021-01-20 VITALS
SYSTOLIC BLOOD PRESSURE: 110 MMHG | TEMPERATURE: 97.2 F | DIASTOLIC BLOOD PRESSURE: 78 MMHG | HEIGHT: 61 IN | WEIGHT: 276 LBS | BODY MASS INDEX: 52.11 KG/M2 | OXYGEN SATURATION: 98 % | HEART RATE: 80 BPM

## 2021-01-20 DIAGNOSIS — R78.9 FINDING OF UNSPECIFIED SUBSTANCE, NOT NORMALLY FOUND IN BLOOD: ICD-10-CM

## 2021-01-20 DIAGNOSIS — R03.0 ELEVATED BLOOD PRESSURE READING: ICD-10-CM

## 2021-01-20 DIAGNOSIS — Z78.0 POSTMENOPAUSAL: ICD-10-CM

## 2021-01-20 DIAGNOSIS — I26.99 ACUTE PULMONARY EMBOLISM, UNSPECIFIED PULMONARY EMBOLISM TYPE, UNSPECIFIED WHETHER ACUTE COR PULMONALE PRESENT (HCC): ICD-10-CM

## 2021-01-20 DIAGNOSIS — R79.89 ELEVATED BRAIN NATRIURETIC PEPTIDE (BNP) LEVEL: ICD-10-CM

## 2021-01-20 DIAGNOSIS — R79.89 ABNORMAL TSH: ICD-10-CM

## 2021-01-20 DIAGNOSIS — J98.59 OTHER DISEASES OF MEDIASTINUM, NOT ELSEWHERE CLASSIFIED: ICD-10-CM

## 2021-01-20 DIAGNOSIS — D68.59 HYPERCOAGULABLE STATE (HCC): ICD-10-CM

## 2021-01-20 DIAGNOSIS — Z12.11 COLON CANCER SCREENING: ICD-10-CM

## 2021-01-20 DIAGNOSIS — Z00.00 ENCOUNTER FOR MEDICAL EXAMINATION TO ESTABLISH CARE: Primary | ICD-10-CM

## 2021-01-20 DIAGNOSIS — Z12.9 SCREENING FOR CANCER: ICD-10-CM

## 2021-01-20 DIAGNOSIS — Z12.31 ENCOUNTER FOR SCREENING MAMMOGRAM FOR MALIGNANT NEOPLASM OF BREAST: ICD-10-CM

## 2021-01-20 PROCEDURE — 99204 OFFICE O/P NEW MOD 45 MIN: CPT | Performed by: STUDENT IN AN ORGANIZED HEALTH CARE EDUCATION/TRAINING PROGRAM

## 2021-01-20 RX ORDER — BLOOD PRESSURE TEST KIT
KIT MISCELLANEOUS
Qty: 1 KIT | Refills: 0 | Status: SHIPPED | OUTPATIENT
Start: 2021-01-20 | End: 2021-01-21 | Stop reason: SDUPTHER

## 2021-01-20 ASSESSMENT — ENCOUNTER SYMPTOMS
WHEEZING: 0
COUGH: 0
CHEST TIGHTNESS: 0
DIARRHEA: 0
SORE THROAT: 0
SHORTNESS OF BREATH: 1
ABDOMINAL PAIN: 0
NAUSEA: 0
VOMITING: 0
CONSTIPATION: 0
EYE DISCHARGE: 0

## 2021-01-20 ASSESSMENT — PATIENT HEALTH QUESTIONNAIRE - PHQ9
SUM OF ALL RESPONSES TO PHQ QUESTIONS 1-9: 1
SUM OF ALL RESPONSES TO PHQ QUESTIONS 1-9: 1
SUM OF ALL RESPONSES TO PHQ9 QUESTIONS 1 & 2: 1

## 2021-01-20 NOTE — PROGRESS NOTES
No results found for: LABA1C          ( goal A1Cis < 7)   No results found for: LABMICR  LDL Cholesterol (mg/dL)   Date Value   01/04/2021 68       (goal LDL is <100)   BUN (mg/dL)   Date Value   01/03/2021 19     BP Readings from Last 3 Encounters:   01/20/21 110/78   01/06/21 119/78          (goal 120/80)    History reviewed. No pertinent past medical history. Past Surgical History:   Procedure Laterality Date    KNEE ARTHROSCOPY         Family History   Problem Relation Age of Onset    Cancer Mother     Kidney Disease Father        Social History     Tobacco Use    Smoking status: Never Smoker    Smokeless tobacco: Never Used   Substance Use Topics    Alcohol use: Never     Frequency: Never      Current Outpatient Medications   Medication Sig Dispense Refill    Blood Pressure KIT Use to check BP twice a day 1 kit 0    rivaroxaban 15 & 20 MG Starter Pack Take as directed on package. 1 Package 0    lisinopril (PRINIVIL;ZESTRIL) 5 MG tablet Take 1 tablet by mouth daily 30 tablet 1    carvedilol (COREG) 3.125 MG tablet Take 1 tablet by mouth 2 times daily (with meals) 60 tablet 1     No current facility-administered medications for this visit.       No Known Allergies    Health Maintenance   Topic Date Due    Hepatitis C screen  1950    Breast cancer screen  05/25/1998    Shingles Vaccine (1 of 2) 05/25/1998    Colon cancer screen colonoscopy  05/25/1998    DEXA (modify frequency per FRAX score)  05/25/2003    Annual Wellness Visit (AWV)  01/03/2021    Pneumococcal 65+ years Vaccine (1 of 1 - PPSV23) 03/12/2021 (Originally 5/25/2013)    DTaP/Tdap/Td vaccine (1 - Tdap) 01/20/2022 (Originally 5/25/1967)    Flu vaccine (1) 01/20/2022 (Originally 9/1/2020)    Potassium monitoring  01/03/2022    Creatinine monitoring  01/03/2022    Lipid screen  01/04/2026    Hepatitis A vaccine  Aged Out    Hepatitis B vaccine  Aged Out    Hib vaccine  Aged Out    Meningococcal (ACWY) vaccine  Aged Out Subjective:     Review of Systems   Constitutional: Negative for appetite change, fatigue and fever. HENT: Negative for congestion, ear pain, hearing loss and sore throat. Eyes: Negative for discharge and visual disturbance. Respiratory: Positive for shortness of breath. Negative for cough, chest tightness and wheezing. Cardiovascular: Positive for leg swelling. Negative for chest pain and palpitations. Gastrointestinal: Negative for abdominal pain, constipation, diarrhea, nausea and vomiting. Genitourinary: Negative for flank pain, frequency, hematuria and urgency. Musculoskeletal: Negative for arthralgias, gait problem, joint swelling and myalgias. Skin: Negative. Neurological: Negative for dizziness, weakness, numbness and headaches. Psychiatric/Behavioral: Negative. Negative for dysphoric mood. The patient is not nervous/anxious. Objective:     Physical Exam  Vitals signs reviewed. Constitutional:       Appearance: Normal appearance. She is obese. HENT:      Head: Normocephalic and atraumatic. Nose: Nose normal.      Mouth/Throat:      Mouth: Mucous membranes are moist.      Pharynx: Oropharynx is clear. Eyes:      Extraocular Movements: Extraocular movements intact. Conjunctiva/sclera: Conjunctivae normal.      Pupils: Pupils are equal, round, and reactive to light. Neck:      Musculoskeletal: Normal range of motion and neck supple. Cardiovascular:      Rate and Rhythm: Normal rate and regular rhythm. Heart sounds: Normal heart sounds. No murmur. No gallop. Pulmonary:      Effort: Pulmonary effort is normal. No respiratory distress. Breath sounds: Normal breath sounds. No stridor. No wheezing. Comments: On 3 L of oxygen per nasal cannula  Abdominal:      General: Bowel sounds are normal. There is no distension. Palpations: Abdomen is soft. Tenderness: There is no abdominal tenderness. There is no guarding or rebound. Musculoskeletal: Normal range of motion. General: No swelling or tenderness. Skin:     General: Skin is warm and dry. Coloration: Skin is not jaundiced. Findings: No rash. Neurological:      General: No focal deficit present. Mental Status: She is alert and oriented to person, place, and time. Psychiatric:         Mood and Affect: Mood normal.         Behavior: Behavior normal.         Thought Content: Thought content normal.         Judgment: Judgment normal.       /78   Pulse 80   Temp 97.2 °F (36.2 °C)   Ht 5' 1\" (1.549 m)   Wt 276 lb (125.2 kg)   SpO2 98%   BMI 52.15 kg/m²     Assessment/Plan:   1. Encounter for medical examination to establish care  -     Hepatitis C Antibody; Future  2. Acute pulmonary embolism, unspecified pulmonary embolism type, unspecified whether acute cor pulmonale present ([facility]  -     [facility] Order for Pulse Ox as OP  3. Elevated blood pressure readings  -     Blood Pressure KIT; Disp-1 kit, R-0, NormalUse to check BP twice a day  4. Hypercoagulable state ([facility]  -     AFP Tumor Marker; Future  -     CEA; Future  5. Elevated brain natriuretic peptide (BNP) level  6. Colon cancer screening  -     Cologuard; Future  7. Encounter for screening mammogram for malignant neoplasm of breast  -     CHRIS DIGITAL SCREEN W OR WO CAD BILATERAL; Future  8. Postmenopausal  -     DEXA Bone Density Axial Skeleton; Future  9. Finding of unspecified substance, not normally found in blood   -     CEA; Future  10. Screening for cancer  -     AFP Tumor Marker; Future  -     CEA; Future  11. Other diseases of mediastinum, not elsewhere classified   -     AFP Tumor Marker; Future  12.  Abnormal TSH  -     TSH With Reflex Ft4; Future 1.  Recent hospitalization for bilateral acute pulmonary embolism, currently on Xarelto 20 mg daily. Currently on 3 L of oxygen mostly during the day. Able to sleep without oxygen at night. We will continue to wean off as tolerated. Pulse oximeter ordered for home. Thrombosis possibly provoked by being in car rides and being sedentary. Work-up ordered to look for occult malignancy including breast cancer screening, colon cancer screening, AFP and CEA. Strong family history of cancer including breast cancer and skin cancer. 2.  Had elevated blood pressure readings during hospitalization and was started on carvedilol and lisinopril. Ordered blood pressure monitor for home. Advised to keep a log of blood pressure readings. Plan to discontinue medications if blood pressure remains normal at next visit. 3.  Had elevated D-dimer and BNP, echocardiogram was normal with mild right atrial and right ventricle dilation. Significant improvement in leg swelling and shortness of breath. Will reassess at next visit and add a diuretic if needed. 4.  Had TSH of less than 0.1 during hospitalization, could be transient abnormality due to medical condition. Will repeat now. 5.  Ordered mammogram and DEXA scan. Ordered Cologuard, did not want to get colonoscopy. 6.  Will discuss PPSV, shingles and Tdap vaccine at next visit        Return in about 1 week (around 1/27/2021) for Follow up PE, HTN. Okay for virtual visit. Orders Placed This Encounter   Procedures    Cologuard     This test is performed by an external laboratory and is used for result attachment only. It is required that this order requisition be faxed to: [employer] @@ [phone]. See [email or web address] for further information.      Standing Status:   Future     Standing Expiration Date:   3/20/2021    CHRIS DIGITAL SCREEN W OR WO CAD BILATERAL     Standing Status:   Future     Standing Expiration Date:   3/20/2022

## 2021-01-21 ENCOUNTER — TELEPHONE (OUTPATIENT)
Dept: FAMILY MEDICINE CLINIC | Age: 71
End: 2021-01-21

## 2021-01-21 DIAGNOSIS — R03.0 ELEVATED BLOOD PRESSURE READING: ICD-10-CM

## 2021-01-21 RX ORDER — BLOOD PRESSURE TEST KIT
KIT MISCELLANEOUS
Qty: 1 KIT | Refills: 0 | Status: SHIPPED | OUTPATIENT
Start: 2021-01-21

## 2021-01-27 ENCOUNTER — TELEMEDICINE (OUTPATIENT)
Dept: FAMILY MEDICINE CLINIC | Age: 71
End: 2021-01-27
Payer: MEDICARE

## 2021-01-27 DIAGNOSIS — I10 ESSENTIAL HYPERTENSION: ICD-10-CM

## 2021-01-27 DIAGNOSIS — I26.99 ACUTE PULMONARY EMBOLISM, UNSPECIFIED PULMONARY EMBOLISM TYPE, UNSPECIFIED WHETHER ACUTE COR PULMONALE PRESENT (HCC): Primary | ICD-10-CM

## 2021-01-27 DIAGNOSIS — R06.09 DYSPNEA ON EXERTION: ICD-10-CM

## 2021-01-27 DIAGNOSIS — Z99.81 OXYGEN DEPENDENT: ICD-10-CM

## 2021-01-27 DIAGNOSIS — I27.20 MILD PULMONARY HYPERTENSION (HCC): ICD-10-CM

## 2021-01-27 DIAGNOSIS — G47.09 OTHER INSOMNIA: ICD-10-CM

## 2021-01-27 PROCEDURE — 99214 OFFICE O/P EST MOD 30 MIN: CPT | Performed by: STUDENT IN AN ORGANIZED HEALTH CARE EDUCATION/TRAINING PROGRAM

## 2021-01-27 RX ORDER — CHOLECALCIFEROL (VITAMIN D3) 125 MCG
5 CAPSULE ORAL DAILY
Qty: 30 TABLET | Refills: 0 | Status: SHIPPED | OUTPATIENT
Start: 2021-01-27 | End: 2021-03-04

## 2021-01-27 NOTE — PROGRESS NOTES
2021    TELEHEALTH EVALUATION -- Audio/Visual (During [facility]-09 public health emergency)    HPI:    Jayashree Rios (:  1950) has requested an audio/video evaluation for the following concern(s):    She had a recent bilateral pulmonary embolism for which she was put on Xarelto. She has been on 3 L of oxygen per nasal cannula since her discharge from the hospital.  States that now she is able to tolerate short periods without using the oxygen. Has been checking oxygen saturations at home with pulse oximeter and they have been above 95% mostly. Her leg edema has also improved significantly since last visit. Overall she is feeling better and stronger. Denies any dark stools or bruising. Still taking her blood pressure medications. Has not yet received her blood pressure monitor at home so was not able to check blood pressure. Labs are still pending. Review of Systems   Constitutional: Negative for appetite change, fatigue and fever. HENT: Negative for congestion, ear pain, hearing loss and sore throat. Eyes: Negative for discharge and visual disturbance. Respiratory: Positive for shortness of breath. Negative for cough, chest tightness and wheezing. Cardiovascular: Positive for leg swelling. Negative for chest pain and palpitations. Gastrointestinal: Negative for abdominal pain, constipation, diarrhea, nausea and vomiting. Genitourinary: Negative for flank pain, frequency, hematuria and urgency. Musculoskeletal: Negative for arthralgias, gait problem, joint swelling and myalgias. Skin: Negative. Neurological: Negative for dizziness, weakness, numbness and headaches. Psychiatric/Behavioral: Negative. Negative for dysphoric mood. The patient is not nervous/anxious. Prior to Visit Medications    Medication Sig Taking?  Authorizing Provider   melatonin 5 MG TABS tablet Take 1 tablet by mouth daily Take 1-2 tab as needed at bedtime Yes Brina Tuttle MD Blood Pressure KIT Use to check BP twice a day Yes Ana María Izaguirre MD   rivaroxaban 15 & 20 MG Starter Pack Take as directed on package.  Yes Erica Zimmerman MD   lisinopril (PRINIVIL;ZESTRIL) 5 MG tablet Take 1 tablet by mouth daily Yes Erica Zimmerman MD   carvedilol (COREG) 3.125 MG tablet Take 1 tablet by mouth 2 times daily (with meals) Yes Erica Zimmerman MD       Social History     Tobacco Use    Smoking status: Never Smoker    Smokeless tobacco: Never Used   Substance Use Topics    Alcohol use: Never     Frequency: Never    Drug use: Never        No Known Allergies, No past medical history on file.,   Past Surgical History:   Procedure Laterality Date    KNEE ARTHROSCOPY     ,   Social History     Tobacco Use    Smoking status: Never Smoker    Smokeless tobacco: Never Used   Substance Use Topics    Alcohol use: Never     Frequency: Never    Drug use: Never   ,   Health Maintenance   Topic Date Due    Hepatitis C screen  1950    Breast cancer screen  05/25/1998    Colon cancer screen colonoscopy  05/25/1998    DEXA (modify frequency per FRAX score)  05/25/2003    Annual Wellness Visit (AWV)  01/03/2021    Pneumococcal 65+ years Vaccine (1 of 1 - PPSV23) 03/12/2021 (Originally 5/25/2013)    Shingles Vaccine (1 of 2) 03/21/2021 (Originally 5/25/1998)    DTaP/Tdap/Td vaccine (1 - Tdap) 01/20/2022 (Originally 5/25/1967)    Flu vaccine (1) 01/20/2022 (Originally 9/1/2020)    Potassium monitoring  01/03/2022    Creatinine monitoring  01/03/2022    Lipid screen  01/04/2026    Hepatitis A vaccine  Aged Out    Hepatitis B vaccine  Aged Out    Hib vaccine  Aged Out    Meningococcal (ACWY) vaccine  Aged Out       PHYSICAL EXAMINATION:  [ INSTRUCTIONS:  \"[x]\" Indicates a positive item  \"[]\" Indicates a negative item  -- DELETE ALL ITEMS NOT EXAMINED]  Vital Signs: (As obtained by patient/caregiver or practitioner observation) Blood pressure-  Heart rate-    Respiratory rate-    Temperature-  Pulse oximetry-     Constitutional: [x] Appears well-developed and well-nourished [x] No apparent distress      [] Abnormal-   Mental status  [x] Alert and awake  [x] Oriented to person/place/time [x]Able to follow commands      Eyes:  EOM    [x]  Normal  [] Abnormal-  Sclera  [x]  Normal  [] Abnormal -         Discharge [x]  None visible  [] Abnormal -    HENT:   [x] Normocephalic, atraumatic. [] Abnormal   [x] Mouth/Throat: Mucous membranes are moist.     External Ears [x] Normal  [] Abnormal-     Neck: [x] No visualized mass     Pulmonary/Chest: [x] Respiratory effort normal.  [x] No visualized signs of difficulty breathing or respiratory distress        [] Abnormal-      Musculoskeletal:   [] Normal gait with no signs of ataxia         [x] Normal range of motion of neck        [] Abnormal-       Neurological:        [x] No Facial Asymmetry (Cranial nerve 7 motor function) (limited exam to video visit)          [x] No gaze palsy        [] Abnormal-         Skin:        [x] No significant exanthematous lesions or discoloration noted on facial skin         [] Abnormal-            Psychiatric:       [x] Normal Affect [] No Hallucinations        [] Abnormal-     Other pertinent observable physical exam findings  -using oxygen per nasal cannula. ASSESSMENT/PLAN:  1. Acute pulmonary embolism, unspecified pulmonary embolism type, unspecified whether acute cor pulmonale present (HCC)   -Continue Xarelto 20 mg daily  -No signs of active bleeding or bruising  -Labs ordered to check for occult malignancy for possibly unprovoked thrombosis, including mammogram and tumor markers. Cologuard ordered. 2. Mild pulmonary hypertension (HCC)  - Brain Natriuretic Peptide; Future  -Improving leg edema, will recheck BNP    3.  Oxygen dependent  -On 3 L per nasal cannula -Discussed slow wean off of oxygen, advised to use pulse ox to check oxygen saturations and try to maintain saturation above 92%. 4. Dyspnea on exertion   - Brain Natriuretic Peptide; Future    5. Essential hypertension  - CBC Auto Differential; Future  - Comprehensive Metabolic Panel; Future    Well-controlled, advised to use blood pressure monitor at home and bring a log at next visit. Plan is to discontinue antihypertensives if blood pressure remains well within normal limits since she had no previous history of hypertension prior to admission. 6. Other insomnia  - melatonin 5 MG TABS tablet; Take 1 tablet by mouth daily Take 1-2 tab as needed at bedtime  Dispense: 30 tablet; Refill: 0  -Insomnia likely related to recent stressors with hospitalization. Will try melatonin initially and discuss antianxiety meds if needed. Return in about 1 week (around 2/3/2021) for Follow up HTN, PE. Lei Yoo is a 65 y.o. female being evaluated by a Virtual Visit (video visit) encounter to address concerns as mentioned above. A caregiver was present when appropriate. Due to this being a TeleHealth encounter (During [facility] public health emergency), evaluation of the following organ systems was limited: Vitals/Constitutional/EENT/Resp/CV/GI//MS/Neuro/Skin/Heme-Lymph-Imm. Pursuant to the emergency declaration under the [address] [place] [address], [address] [place] [address] authority and the [employer] Act, this Virtual Visit was conducted with patient's (and/or legal guardian's) consent, to reduce the patient's risk of exposure to COVID-19 and provide necessary medical care. The patient (and/or legal guardian) has also been advised to contact this office for worsening conditions or problems, and seek emergency medical treatment and/or call 911 if deemed necessary.      Patient identification was verified at the start of the visit: Yes Total time spent on this encounter: 30 minutes    Services were provided through a video synchronous discussion virtually to substitute for in-person clinic visit. Patient and provider were located at their individual homes. --Shira Ramos MD on 1/27/2021 at 4:20 PM    An electronic signature was used to authenticate this note.

## 2021-01-28 ENCOUNTER — HOSPITAL ENCOUNTER (OUTPATIENT)
Age: 71
Setting detail: SPECIMEN
Discharge: HOME | End: 2021-01-28
Payer: MEDICARE

## 2021-01-28 DIAGNOSIS — Z00.00 ENCOUNTER FOR MEDICAL EXAMINATION TO ESTABLISH CARE: ICD-10-CM

## 2021-01-28 DIAGNOSIS — I10 ESSENTIAL HYPERTENSION: ICD-10-CM

## 2021-01-28 DIAGNOSIS — Z12.9 SCREENING FOR CANCER: ICD-10-CM

## 2021-01-28 DIAGNOSIS — I27.20 MILD PULMONARY HYPERTENSION (HCC): ICD-10-CM

## 2021-01-28 DIAGNOSIS — R78.9 FINDING OF UNSPECIFIED SUBSTANCE, NOT NORMALLY FOUND IN BLOOD: ICD-10-CM

## 2021-01-28 DIAGNOSIS — R79.89 ABNORMAL TSH: ICD-10-CM

## 2021-01-28 DIAGNOSIS — R06.09 DYSPNEA ON EXERTION: ICD-10-CM

## 2021-01-28 DIAGNOSIS — J98.59 OTHER DISEASES OF MEDIASTINUM, NOT ELSEWHERE CLASSIFIED: ICD-10-CM

## 2021-01-28 DIAGNOSIS — D68.59 HYPERCOAGULABLE STATE (HCC): ICD-10-CM

## 2021-01-28 LAB
ABSOLUTE EOS #: 0.07 K/UL (ref 0–0.44)
ABSOLUTE IMMATURE GRANULOCYTE: <0.03 K/UL (ref 0–0.3)
ABSOLUTE LYMPH #: 1.4 K/UL (ref 1.1–3.7)
ABSOLUTE MONO #: 0.72 K/UL (ref 0.1–1.2)
AFP: 1.1 UG/L
ALBUMIN SERPL-MCNC: 4 G/DL (ref 3.5–5.2)
ALBUMIN/GLOBULIN RATIO: 1.4 (ref 1–2.5)
ALP BLD-CCNC: 100 U/L (ref 35–104)
ALT SERPL-CCNC: 16 U/L (ref 5–33)
ANION GAP SERPL CALCULATED.3IONS-SCNC: 8 MMOL/L (ref 9–17)
AST SERPL-CCNC: 20 U/L
BASOPHILS # BLD: 1 % (ref 0–2)
BASOPHILS ABSOLUTE: 0.03 K/UL (ref 0–0.2)
BILIRUB SERPL-MCNC: 0.21 MG/DL (ref 0.3–1.2)
BNP INTERPRETATION: ABNORMAL
BUN BLDV-MCNC: 19 MG/DL (ref 8–23)
BUN/CREAT BLD: ABNORMAL (ref 9–20)
CALCIUM SERPL-MCNC: 9.8 MG/DL (ref 8.6–10.4)
CHLORIDE BLD-SCNC: 106 MMOL/L (ref 98–107)
CO2: 25 MMOL/L (ref 20–31)
CREAT SERPL-MCNC: 0.87 MG/DL (ref 0.5–0.9)
DIFFERENTIAL TYPE: ABNORMAL
EOSINOPHILS RELATIVE PERCENT: 1 % (ref 1–4)
GFR AFRICAN AMERICAN: >60 ML/MIN
GFR NON-AFRICAN AMERICAN: >60 ML/MIN
GFR SERPL CREATININE-BSD FRML MDRD: ABNORMAL ML/MIN/{1.73_M2}
GFR SERPL CREATININE-BSD FRML MDRD: ABNORMAL ML/MIN/{1.73_M2}
GLUCOSE BLD-MCNC: 88 MG/DL (ref 70–99)
HCT VFR BLD CALC: 40.1 % (ref 36.3–47.1)
HEMOGLOBIN: 12.3 G/DL (ref 11.9–15.1)
IMMATURE GRANULOCYTES: 0 %
LYMPHOCYTES # BLD: 23 % (ref 24–43)
MCH RBC QN AUTO: 28.5 PG (ref 25.2–33.5)
MCHC RBC AUTO-ENTMCNC: 30.7 G/DL (ref 28.4–34.8)
MCV RBC AUTO: 93 FL (ref 82.6–102.9)
MONOCYTES # BLD: 12 % (ref 3–12)
NRBC AUTOMATED: 0 PER 100 WBC
PDW BLD-RTO: 12.5 % (ref 11.8–14.4)
PLATELET # BLD: 278 K/UL (ref 138–453)
PLATELET ESTIMATE: ABNORMAL
PMV BLD AUTO: 11.9 FL (ref 8.1–13.5)
POTASSIUM SERPL-SCNC: 6.2 MMOL/L (ref 3.7–5.3)
PRO-BNP: 363 PG/ML
RBC # BLD: 4.31 M/UL (ref 3.95–5.11)
RBC # BLD: ABNORMAL 10*6/UL
SEG NEUTROPHILS: 63 % (ref 36–65)
SEGMENTED NEUTROPHILS ABSOLUTE COUNT: 3.96 K/UL (ref 1.5–8.1)
SODIUM BLD-SCNC: 139 MMOL/L (ref 135–144)
TOTAL PROTEIN: 6.9 G/DL (ref 6.4–8.3)
TSH SERPL DL<=0.05 MIU/L-ACNC: <0.01 MIU/L (ref 0.3–5)
WBC # BLD: 6.2 K/UL (ref 3.5–11.3)
WBC # BLD: ABNORMAL 10*3/UL

## 2021-01-28 ASSESSMENT — ENCOUNTER SYMPTOMS
COUGH: 0
DIARRHEA: 0
CONSTIPATION: 0
SHORTNESS OF BREATH: 1
CHEST TIGHTNESS: 0
EYE DISCHARGE: 0
ABDOMINAL PAIN: 0
WHEEZING: 0
NAUSEA: 0
VOMITING: 0
SORE THROAT: 0

## 2021-01-29 ENCOUNTER — HOSPITAL ENCOUNTER (EMERGENCY)
Age: 71
Discharge: HOME | End: 2021-01-29
Attending: EMERGENCY MEDICINE
Payer: MEDICARE

## 2021-01-29 ENCOUNTER — TELEPHONE (OUTPATIENT)
Dept: FAMILY MEDICINE CLINIC | Age: 71
End: 2021-01-29

## 2021-01-29 VITALS
HEIGHT: 62 IN | HEART RATE: 82 BPM | WEIGHT: 268.2 LBS | OXYGEN SATURATION: 99 % | TEMPERATURE: 98.2 F | SYSTOLIC BLOOD PRESSURE: 143 MMHG | RESPIRATION RATE: 18 BRPM | DIASTOLIC BLOOD PRESSURE: 81 MMHG | BODY MASS INDEX: 49.35 KG/M2

## 2021-01-29 DIAGNOSIS — E87.5 HIGH SERUM POTASSIUM LEVEL: ICD-10-CM

## 2021-01-29 DIAGNOSIS — E05.90 HYPERTHYROIDISM: Primary | ICD-10-CM

## 2021-01-29 DIAGNOSIS — E87.5 HYPERKALEMIA: Primary | ICD-10-CM

## 2021-01-29 DIAGNOSIS — Z01.89 ENCOUNTER FOR LABORATORY TEST: Primary | ICD-10-CM

## 2021-01-29 LAB
ABSOLUTE EOS #: 0.16 K/UL (ref 0–0.44)
ABSOLUTE IMMATURE GRANULOCYTE: 0.02 K/UL (ref 0–0.3)
ABSOLUTE LYMPH #: 2.36 K/UL (ref 1.1–3.7)
ABSOLUTE MONO #: 0.75 K/UL (ref 0.1–1.2)
ANION GAP SERPL CALCULATED.3IONS-SCNC: 10 MMOL/L (ref 9–17)
BASOPHILS # BLD: 1 % (ref 0–2)
BASOPHILS ABSOLUTE: 0.05 K/UL (ref 0–0.2)
BUN BLDV-MCNC: 23 MG/DL (ref 8–23)
BUN/CREAT BLD: 28 (ref 9–20)
CALCIUM SERPL-MCNC: 9.4 MG/DL (ref 8.6–10.4)
CARCINOEMBRYONIC ANTIGEN: 2.3 NG/ML
CHLORIDE BLD-SCNC: 105 MMOL/L (ref 98–107)
CO2: 23 MMOL/L (ref 20–31)
CREAT SERPL-MCNC: 0.83 MG/DL (ref 0.5–0.9)
DIFFERENTIAL TYPE: NORMAL
EKG ATRIAL RATE: 79 BPM
EKG P AXIS: 38 DEGREES
EKG P-R INTERVAL: 154 MS
EKG Q-T INTERVAL: 360 MS
EKG QRS DURATION: 84 MS
EKG QTC CALCULATION (BAZETT): 412 MS
EKG R AXIS: 6 DEGREES
EKG T AXIS: -4 DEGREES
EKG VENTRICULAR RATE: 79 BPM
EOSINOPHILS RELATIVE PERCENT: 3 % (ref 1–4)
GFR AFRICAN AMERICAN: >60 ML/MIN
GFR NON-AFRICAN AMERICAN: >60 ML/MIN
GFR SERPL CREATININE-BSD FRML MDRD: ABNORMAL ML/MIN/{1.73_M2}
GFR SERPL CREATININE-BSD FRML MDRD: ABNORMAL ML/MIN/{1.73_M2}
GLUCOSE BLD-MCNC: 112 MG/DL (ref 70–99)
HCT VFR BLD CALC: 39.6 % (ref 36.3–47.1)
HEMOGLOBIN: 12.3 G/DL (ref 11.9–15.1)
HEPATITIS C ANTIBODY: NONREACTIVE
IMMATURE GRANULOCYTES: 0 %
LYMPHOCYTES # BLD: 37 % (ref 24–43)
MAGNESIUM: 2.3 MG/DL (ref 1.6–2.6)
MCH RBC QN AUTO: 29.1 PG (ref 25.2–33.5)
MCHC RBC AUTO-ENTMCNC: 31.1 G/DL (ref 28.4–34.8)
MCV RBC AUTO: 93.6 FL (ref 82.6–102.9)
MONOCYTES # BLD: 12 % (ref 3–12)
NRBC AUTOMATED: 0 PER 100 WBC
PDW BLD-RTO: 12.4 % (ref 11.8–14.4)
PLATELET # BLD: 246 K/UL (ref 138–453)
PLATELET ESTIMATE: NORMAL
PMV BLD AUTO: 10.8 FL (ref 8.1–13.5)
POTASSIUM SERPL-SCNC: 4.9 MMOL/L (ref 3.7–5.3)
RBC # BLD: 4.23 M/UL (ref 3.95–5.11)
RBC # BLD: NORMAL 10*6/UL
SEG NEUTROPHILS: 47 % (ref 36–65)
SEGMENTED NEUTROPHILS ABSOLUTE COUNT: 3.13 K/UL (ref 1.5–8.1)
SODIUM BLD-SCNC: 138 MMOL/L (ref 135–144)
THYROXINE, FREE: 1.72 NG/DL (ref 0.93–1.7)
THYROXINE, FREE: 1.88 NG/DL (ref 0.93–1.7)
TROPONIN INTERP: NORMAL
TROPONIN T: NORMAL NG/ML
TROPONIN, HIGH SENSITIVITY: 7 NG/L (ref 0–14)
TSH SERPL DL<=0.05 MIU/L-ACNC: <0.01 MIU/L (ref 0.3–5)
WBC # BLD: 6.5 K/UL (ref 3.5–11.3)
WBC # BLD: NORMAL 10*3/UL

## 2021-01-29 PROCEDURE — 83735 ASSAY OF MAGNESIUM: CPT

## 2021-01-29 PROCEDURE — 84443 ASSAY THYROID STIM HORMONE: CPT

## 2021-01-29 PROCEDURE — 93005 ELECTROCARDIOGRAM TRACING: CPT | Performed by: EMERGENCY MEDICINE

## 2021-01-29 PROCEDURE — 93010 ELECTROCARDIOGRAM REPORT: CPT | Performed by: INTERNAL MEDICINE

## 2021-01-29 PROCEDURE — 85025 COMPLETE CBC W/AUTO DIFF WBC: CPT

## 2021-01-29 PROCEDURE — 80048 BASIC METABOLIC PNL TOTAL CA: CPT

## 2021-01-29 PROCEDURE — 99283 EMERGENCY DEPT VISIT LOW MDM: CPT

## 2021-01-29 PROCEDURE — 84439 ASSAY OF FREE THYROXINE: CPT

## 2021-01-29 PROCEDURE — 84484 ASSAY OF TROPONIN QUANT: CPT

## 2021-01-29 RX ORDER — SODIUM POLYSTYRENE SULFONATE 15 G/60ML
15 SUSPENSION ORAL; RECTAL 2 TIMES DAILY
Qty: 1 BOTTLE | Refills: 0 | Status: SHIPPED | OUTPATIENT
Start: 2021-01-29 | End: 2021-02-04

## 2021-01-29 ASSESSMENT — ENCOUNTER SYMPTOMS
TROUBLE SWALLOWING: 0
ABDOMINAL PAIN: 0
SHORTNESS OF BREATH: 1
VOMITING: 0

## 2021-01-29 NOTE — ED PROVIDER NOTES
EMERGENCY DEPARTMENT ENCOUNTER    Pt Name: Beth Giang  MRN: [id number]  [place] 1950  Date of evaluation: 1/29/21  CHIEF COMPLAINT       Chief Complaint   Patient presents with    Abnormal Lab     HISTORY OF PRESENT ILLNESS   Patient is a 68-year-old female here after she had outpatient blood work drawn yesterday showing hyperkalemia 6.2. Patient had a recent admission at the beginning of the month for bilateral PE and had since been started on 2 blood pressure medications and Xarelto. They have been slowly weaning her oxygen at home. She states she has been feeling otherwise well. No recent illness no recent fevers vomiting diarrhea normal urination. Denies chest pain any worsening shortness of breath or abdominal pain. She is here with her family member who states that she is continue taking her potassium supplement despite the doctor not knowing this. REVIEW OF SYSTEMS     Review of Systems   Constitutional: Negative for chills and fever. HENT: Negative for trouble swallowing. Eyes: Negative for visual disturbance. Respiratory: Positive for shortness of breath. Cardiovascular: Negative for chest pain. Gastrointestinal: Negative for abdominal pain and vomiting. Genitourinary: Negative for difficulty urinating and dysuria. Musculoskeletal: Negative for neck pain. Skin: Negative for rash. Neurological: Negative for weakness. Psychiatric/Behavioral: Negative for confusion.      PASTMEDICAL HISTORY     Past Medical History:   Diagnosis Date    COPD (chronic obstructive pulmonary disease) ([address]     Disease of blood and blood forming organ     Hypertension      SURGICAL HISTORY       Past Surgical History:   Procedure Laterality Date    KNEE ARTHROSCOPY       CURRENT MEDICATIONS       Previous Medications    BLOOD PRESSURE KIT    Use to check BP twice a day    CARVEDILOL (COREG) 3.125 MG TABLET    Take 1 tablet by mouth 2 times daily (with meals)    LISINOPRIL (PRINIVIL;ZESTRIL) 5 MG TABLET    Take 1 tablet by mouth daily    MELATONIN 5 MG TABS TABLET    Take 1 tablet by mouth daily Take 1-2 tab as needed at bedtime    RIVAROXABAN 15 & 20 MG STARTER PACK    Take as directed on package. ALLERGIES     has No Known Allergies. FAMILY HISTORY     She indicated that her mother is . She indicated that her father is . SOCIAL HISTORY       Social History     Tobacco Use    Smoking status: Never Smoker    Smokeless tobacco: Never Used   Substance Use Topics    Alcohol use: Never     Frequency: Never    Drug use: Never     PHYSICAL EXAM     INITIAL VITALS: BP (!) 143/81   Pulse 82   Temp 98.2 °F (36.8 °C) (Oral)   Resp 18   Ht 5' 2\" (1.575 m)   Wt 268 lb 3.2 oz (121.7 kg)   SpO2 99%   BMI 49.05 kg/m²    Physical Exam  Vitals signs and nursing note reviewed. Constitutional:       General: She is not in acute distress. Appearance: She is not ill-appearing, toxic-appearing or diaphoretic. HENT:      Head: Normocephalic and atraumatic. Mouth/Throat:      Mouth: Mucous membranes are moist.      Pharynx: Oropharynx is clear. Eyes:      Extraocular Movements: Extraocular movements intact. Pupils: Pupils are equal, round, and reactive to light. Neck:      Musculoskeletal: Normal range of motion and neck supple. Cardiovascular:      Rate and Rhythm: Normal rate and regular rhythm. Pulmonary:      Effort: Pulmonary effort is normal. No respiratory distress. Comments: On her home oxygen 2 L speaking in full sentences  Abdominal:      Palpations: Abdomen is soft. Tenderness: There is no abdominal tenderness. Musculoskeletal: Normal range of motion. General: No deformity. Right lower leg: No edema. Left lower leg: No edema. Skin:     General: Skin is warm and dry. Capillary Refill: Capillary refill takes less than 2 seconds. Neurological:      General: No focal deficit present.       Mental Status: She is alert and oriented to person, place, and time. Psychiatric:         Thought Content: Thought content normal.         MEDICAL DECISION MAKING:          Please see ED Course below for MDM/ED course. DDx: Hyperkalemia, infection, medication effect    All patient's question's and concerns were answered prior to disposition and patient and/or family expressed understanding and agreement of treatment plan. NIH STROKE SCALE:            PROCEDURES:    Procedures    DIAGNOSTIC RESULTS   EKG:All EKG's are interpreted by the Emergency Department Physician who either signs or Co-signs this chart in the absence of a cardiologist.    Normal sinus rhythm rate of 79 normal intervals normal axis no ST elevations or depressions T wave inversion lead III, flattening in aVF, inversion in V3, Q waves 1 aVL, lead II, abnormal EKG; when compared to prior on 1/3/2021 T wave inversions in 3 and V3 are new, Q waves in 1 and aVL were present    RADIOLOGY:All plain film, CT, MRI, and formal ultrasound images (except ED bedside ultrasound) are read by the radiologist, see reports below, unless otherwisenoted in MDM or here. No orders to display     LABS: All lab results were reviewed by myself, and all abnormals are listed below. Labs Reviewed   BASIC METABOLIC PANEL - Abnormal; Notable for the following components:       Result Value    Glucose 112 (*)     Bun/Cre Ratio 28 (*)     All other components within normal limits   TSH WITH REFLEX - Abnormal; Notable for the following components:    TSH <0.01 (*)     All other components within normal limits   CBC WITH AUTO DIFFERENTIAL   MAGNESIUM   TROPONIN   T4, FREE       EMERGENCY DEPARTMENTCOURSE:     Patient is a 57-year-old female here with a potassium of 6.2 yesterday on outpatient blood test.  She has been taking potassium supplement still. Vitals are stable she is in no distress she is on her home oxygen that she was discharged on from her recent admission.   She denies any recent vomiting or diarrhea or infectious symptoms. We will recheck labs, EKG, reassess for hyperkalemia treatment. EKG just shows some flipped T waves, no widened QRS no peaked T waves to suggest hyperkalemia. Labs show potassium has normalized to 4.9.  TSH is low and this is consistent with her baseline. Free T4 pending but she has no evidence of thyroid storm. Patient updated on results. She feels normal.  Vital stable. At this point patient is clinically stable for discharge. Instructed to call her doctor tomorrow for follow-up and to hold off on potassium supplementation until she sees her doctor. Strict return precautions given. Vitals:    Vitals:    01/29/21 0141   BP: (!) 143/81   Pulse: 82   Resp: 18   Temp: 98.2 °F (36.8 °C)   TempSrc: Oral   SpO2: 99%   Weight: 268 lb 3.2 oz (121.7 kg)   Height: 5' 2\" (1.575 m)       The patient was given the following medications while in the emergency department:  No orders of the defined types were placed in this encounter. CONSULTS:  None    FINAL IMPRESSION      1. Encounter for laboratory test    2. High serum potassium level          DISPOSITION/PLAN   DISPOSITION Decision to discharge       PATIENT REFERRED TO:  Tom Phillip MD  [address]  [phone]    Schedule an appointment as soon as possible for a visit       [facility] ED  [address] [facility]  [phone]    If symptoms worsen    DISCHARGE MEDICATIONS:  New Prescriptions    No medications on file     Kiara Robles MD  Attending Emergency Physician    This note was created with the assistance of a speech-recognition program. While intending to generate a document that actually reflects the content of the visit, no guarantees can be provided that every mistake has been identified and corrected by editing.                     Kiara Robles MD  01/29/21 5012

## 2021-01-29 NOTE — TELEPHONE ENCOUNTER
The call at 12:20 AM from the on-call service to connect me to the lab. The lab states the patient had a critical potassium of 6.2. I asked if there was evidence of hemolysis the said no. I contacted the patient after that at 12:23AM directed her to go to the emergency room due to the elevated potassium. Called [place] and notified them of the patient coming and gave them the history. [facility] [place]. Upon chart review the patient went to [place] repeat potassium was 4.9 in ER.

## 2021-01-29 NOTE — ED TRIAGE NOTES
Patient sent in per PCP for evaluation of hyperkalemia 6.2. Patient denies associated symptoms. Patient was recently admitted with a PE. Was sent home on blood pressure medications and Xarelto. Patient wears 2L home O2.

## 2021-01-29 NOTE — RESULT ENCOUNTER NOTE
I called the patient and advised her to stop taking the over-the-counter potassium supplement and discontinue lisinopril. I also ordered thyroid receptor antibodies and free T3 to be added on to the current labs. Discussed the results with the patient.

## 2021-02-04 ENCOUNTER — OFFICE VISIT (OUTPATIENT)
Dept: FAMILY MEDICINE CLINIC | Age: 71
End: 2021-02-04
Payer: MEDICARE

## 2021-02-04 VITALS
DIASTOLIC BLOOD PRESSURE: 92 MMHG | HEIGHT: 63 IN | OXYGEN SATURATION: 94 % | HEART RATE: 65 BPM | TEMPERATURE: 97.2 F | WEIGHT: 271 LBS | SYSTOLIC BLOOD PRESSURE: 146 MMHG | BODY MASS INDEX: 48.02 KG/M2

## 2021-02-04 DIAGNOSIS — R60.0 BILATERAL EDEMA OF LOWER EXTREMITY: ICD-10-CM

## 2021-02-04 DIAGNOSIS — I26.99 BILATERAL PULMONARY EMBOLISM (HCC): ICD-10-CM

## 2021-02-04 DIAGNOSIS — I27.20 MILD PULMONARY HYPERTENSION (HCC): Primary | ICD-10-CM

## 2021-02-04 DIAGNOSIS — E05.90 SUBCLINICAL HYPERTHYROIDISM: ICD-10-CM

## 2021-02-04 DIAGNOSIS — E87.5 HYPERKALEMIA: ICD-10-CM

## 2021-02-04 PROCEDURE — 99214 OFFICE O/P EST MOD 30 MIN: CPT | Performed by: STUDENT IN AN ORGANIZED HEALTH CARE EDUCATION/TRAINING PROGRAM

## 2021-02-04 RX ORDER — FUROSEMIDE 20 MG/1
20 TABLET ORAL DAILY PRN
Qty: 30 TABLET | Refills: 0 | Status: SHIPPED | OUTPATIENT
Start: 2021-02-04 | End: 2021-03-04

## 2021-02-04 ASSESSMENT — ENCOUNTER SYMPTOMS
CONSTIPATION: 0
VOMITING: 0
SORE THROAT: 0
EYE DISCHARGE: 0
WHEEZING: 0
COUGH: 0
SHORTNESS OF BREATH: 1
NAUSEA: 0
CHEST TIGHTNESS: 0
ABDOMINAL PAIN: 0
DIARRHEA: 0

## 2021-02-04 NOTE — PROGRESS NOTES
[address] PRIMARY CARE  [address] [place] [address] [address] [facility]  Dept: [phone]  Dept Fax: [phone]    Andrew Kelley is a 65 y.o. female who is a Established patient, who presents today for her medical conditions/complaints as noted below:  Chief Complaint   Patient presents with    Follow-up     had a ER visit to due to high K    Discuss Labs    Edema     both legs     Medication Check    Hypertension         HPI:     She had a recent visit to ER on 1/29 due to elevated potassium of 6.2 on labs done at last visit. Her EKG was normal and repeat potassium in the ER was 4.9. She was advised to stop taking OTC potassium pills. We held the Lisinopril 5 mg as well due to concern for hyperkalemia. Reports that since then her legs have been swelling up more than before. She had some issue with leg selling prior to the PE but since then it has gotten worse. Has tried compression stockings previously. Her home BP readings have been in the range of 110-140 SBP and 70-90 DBP. She has been on Xarelto, carvedilol and Lisinopril since she had bilateral PE. Used to be on 3 LNC initially, but today during the visit was able to maintain saturation after walking from the car to the office and waiting for 10 minutes. Still feels short of breath with moderate exertion. Not requiring oxygen at night. Able to sleep better. Her repeat TSH was still less than 0.01, with free T4 1.72 which is borderline elevated. EKG was normal. No other clinical symptom of hyperthyroidism.            No results found for: LABA1C          ( goal A1Cis < 7)   No results found for: LABMICR  LDL Cholesterol (mg/dL)   Date Value   01/04/2021 68       (goal LDL is <100)   AST (U/L)   Date Value   01/28/2021 20     ALT (U/L)   Date Value   01/28/2021 16     BUN (mg/dL)   Date Value   01/29/2021 23     BP Readings from Last 3 Encounters:   02/04/21 (!) 146/92   01/29/21 (!) 143/81   01/20/21 110/78 (goal 120/80)    Past Medical History:   Diagnosis Date    COPD (chronic obstructive pulmonary disease) ([facility] [address]     Disease of blood and blood forming organ     Hypertension       Past Surgical History:   Procedure Laterality Date    KNEE ARTHROSCOPY         Family History   Problem Relation Age of Onset    Cancer Mother     Kidney Disease Father        Social History     Tobacco Use    Smoking status: Never Smoker    Smokeless tobacco: Never Used   Substance Use Topics    Alcohol use: Never     Frequency: Never      Current Outpatient Medications   Medication Sig Dispense Refill    furosemide (LASIX) 20 MG tablet Take 1 tablet by mouth daily as needed (For leg edema) 30 tablet 0    Elastic Bandages & Supports (MEDICAL COMPRESSION STOCKINGS) MISC 1 each by Does not apply route daily as needed (For leg edema) 1 each 0    melatonin 5 MG TABS tablet Take 1 tablet by mouth daily Take 1-2 tab as needed at bedtime 30 tablet 0    Blood Pressure KIT Use to check BP twice a day 1 kit 0    rivaroxaban 15 & 20 MG Starter Pack Take as directed on package. 1 Package 0    lisinopril (PRINIVIL;ZESTRIL) 5 MG tablet Take 1 tablet by mouth daily 30 tablet 1    carvedilol (COREG) 3.125 MG tablet Take 1 tablet by mouth 2 times daily (with meals) 60 tablet 1     No current facility-administered medications for this visit.       No Known Allergies    Health Maintenance   Topic Date Due    COVID-19 Vaccine (1 of 2) 05/25/1966    Breast cancer screen  05/25/1998    Colon cancer screen colonoscopy  05/25/1998    DEXA (modify frequency per FRAX score)  05/25/2003    Annual Wellness Visit (AWV)  01/03/2021    Pneumococcal 65+ years Vaccine (1 of 1 - PPSV23) 03/12/2021 (Originally 5/25/2013)    Shingles Vaccine (1 of 2) 03/21/2021 (Originally 5/25/1998)    DTaP/Tdap/Td vaccine (1 - Tdap) 01/20/2022 (Originally 5/25/1967)    Flu vaccine (1) 01/20/2022 (Originally 9/1/2020)    Potassium monitoring  01/29/2022  Creatinine monitoring  01/29/2022    Lipid screen  01/04/2026    Hepatitis C screen  Completed    Hepatitis A vaccine  Aged Out    Hepatitis B vaccine  Aged Out    Hib vaccine  Aged Out    Meningococcal (ACWY) vaccine  Aged Out       Subjective:     Review of Systems   Constitutional: Negative for appetite change, fatigue and fever. HENT: Negative for congestion, ear pain, hearing loss and sore throat. Eyes: Negative for discharge and visual disturbance. Respiratory: Positive for shortness of breath. Negative for cough, chest tightness and wheezing. Cardiovascular: Positive for leg swelling. Negative for chest pain and palpitations. Gastrointestinal: Negative for abdominal pain, constipation, diarrhea, nausea and vomiting. Genitourinary: Negative for flank pain, frequency, hematuria and urgency. Musculoskeletal: Negative for arthralgias, gait problem, joint swelling and myalgias. Skin: Negative. Neurological: Negative for dizziness, weakness, numbness and headaches. Psychiatric/Behavioral: Negative. Negative for dysphoric mood. The patient is not nervous/anxious. Objective:     Physical Exam  Vitals signs reviewed. Constitutional:       Appearance: Normal appearance. She is obese. HENT:      Head: Normocephalic and atraumatic. Nose: Nose normal.      Mouth/Throat:      Mouth: Mucous membranes are moist.      Pharynx: Oropharynx is clear. Eyes:      Extraocular Movements: Extraocular movements intact. Conjunctiva/sclera: Conjunctivae normal.      Pupils: Pupils are equal, round, and reactive to light. Neck:      Musculoskeletal: Normal range of motion and neck supple. Cardiovascular:      Rate and Rhythm: Normal rate and regular rhythm. Heart sounds: Normal heart sounds. No murmur. No gallop. Pulmonary:      Effort: Pulmonary effort is normal. No respiratory distress. Breath sounds: Normal breath sounds. No stridor. No wheezing.    Abdominal: General: Bowel sounds are normal. There is no distension. Palpations: Abdomen is soft. There is no mass. Tenderness: There is no abdominal tenderness. There is no guarding or rebound. Musculoskeletal: Normal range of motion. General: No swelling or tenderness. Right lower leg: Edema present. Left lower leg: Edema present. Skin:     General: Skin is warm and dry. Coloration: Skin is not jaundiced. Findings: No rash. Neurological:      General: No focal deficit present. Mental Status: She is alert and oriented to person, place, and time. Psychiatric:         Mood and Affect: Mood normal.         Behavior: Behavior normal.         Thought Content: Thought content normal.         Judgment: Judgment normal.       BP (!) 146/92 (Site: Left Wrist, Position: Sitting, Cuff Size: Child)   Pulse 65   Temp 97.2 °F (36.2 °C)   Ht 5' 3\" (1.6 m)   Wt 271 lb (122.9 kg)   SpO2 94%   BMI 48.01 kg/m²     Assessment/Plan:   1. Mild pulmonary hypertension (HCC)  -     furosemide (LASIX) 20 MG tablet; Take 1 tablet by mouth daily as needed (For leg edema), Disp-30 tablet, R-0Normal  -     Basic Metabolic Panel; Future  2. Bilateral edema of lower extremity  -     Elastic Bandages & Supports (MEDICAL COMPRESSION STOCKINGS) MISC; DAILY PRN Starting Thu 2/4/2021, Disp-1 each, R-0, Normal  3. Subclinical hyperthyroidism  -     T4, Free; Future  -     TSH with Reflex; Future  4. Bilateral pulmonary embolism ([address]  5. Hyperkalemia  6. Body mass index (BMI) 45.0-49.9, adult (HCC)   -     Vitamin D 25 Hydroxy; Future     1. HTN- Resumed Lisinopril 5 mg daily. Will discontinue carvedilol. Started on Lasix 20 mg daily as needed for leg edema. Continue to monitor BP at home. 2. Hyperkalemia- resolved. Stopped taking OTC potassium supplement.  Recheck in 4 weeks 3. Subclinical hyperthyroidism- TSH <0.01, Free T4 -1.72. No clinical symptoms present. EKG normal. Will continue to monitor and recheck levels in 4 weeks. 4. LE edema- started on lasix 20 mg daily as needed. Compression stockings ordered. 5. Bilateral PE- Continue Xarelto to complete 3 month course. Continue to slowly wean off of oxygen. Return in about 1 month (around 3/4/2021) for Follow up HTN. Orders Placed This Encounter   Procedures    Basic Metabolic Panel     Standing Status:   Future     Standing Expiration Date:   2021    T4, Free     Standing Status:   Future     Standing Expiration Date:   2021    TSH with Reflex     Standing Status:   Future     Standing Expiration Date:   3/4/2021    Vitamin D 25 Hydroxy     Standing Status:   Future     Standing Expiration Date:   2022     Orders Placed This Encounter   Medications    furosemide (LASIX) 20 MG tablet     Sig: Take 1 tablet by mouth daily as needed (For leg edema)     Dispense:  30 tablet     Refill:  0    Elastic Bandages & Supports (MEDICAL COMPRESSION STOCKINGS) MISC     Si each by Does not apply route daily as needed (For leg edema)     Dispense:  1 each     Refill:  0       Patient given educational materials - see patient instructions. Discussed use, benefit, and side effects of prescribed medications. All patientquestions answered. Pt voiced understanding. Reviewed health maintenance. Instructedto continue current medications, diet and exercise. Patient agreed with treatmentplan. Follow up as directed.      Electronically signed by [facility] MD on 2021 at 7:42 PM

## 2021-02-05 DIAGNOSIS — R60.0 BILATERAL EDEMA OF LOWER EXTREMITY: ICD-10-CM

## 2021-02-05 NOTE — PATIENT INSTRUCTIONS
Patient Education        Leg and Ankle Edema: Care Instructions  Your Care Instructions  Swelling in the legs, ankles, and feet is called edema. It is common after you sit or stand for a while. Long plane flights or car rides often cause swelling in the legs and feet. You may also have swelling if you have to stand for long periods of time at your job. Problems with the veins in the legs (varicose veins) and changes in hormones can also cause swelling. Sometimes the swelling in the ankles and feet is caused by a more serious problem, such as heart failure, infection, blood clots, or liver or kidney disease. Follow-up care is a key part of your treatment and safety. Be sure to make and go to all appointments, and call your doctor if you are having problems. It's also a good idea to know your test results and keep a list of the medicines you take. How can you care for yourself at home? · If your doctor gave you medicine, take it as prescribed. Call your doctor if you think you are having a problem with your medicine. · Whenever you are resting, raise your legs up. Try to keep the swollen area higher than the level of your heart. · Take breaks from standing or sitting in one position. ? Walk around to increase the blood flow in your lower legs. ? Move your feet and ankles often while you stand, or tighten and relax your leg muscles. · Wear support stockings. Put them on in the morning, before swelling gets worse. · Eat a balanced diet. Lose weight if you need to. · Limit the amount of salt (sodium) in your diet. Salt holds fluid in the body and may increase swelling. When should you call for help? Call 911 anytime you think you may need emergency care. For example, call if:    · You have symptoms of a blood clot in your lung (called a pulmonary embolism). These may include:  ? Sudden chest pain. ? Trouble breathing. ? Coughing up blood.    Call your doctor now or seek immediate medical care if:   · You have signs of a blood clot, such as:  ? Pain in your calf, back of the knee, thigh, or groin. ? Redness and swelling in your leg or groin.     · You have symptoms of infection, such as:  ? Increased pain, swelling, warmth, or redness. ? Red streaks or pus. ? A fever. Watch closely for changes in your health, and be sure to contact your doctor if:    · Your swelling is getting worse.     · You have new or worsening pain in your legs.     · You do not get better as expected. Where can you learn more? Go to https://[employer] [email or web address].[employer]. org and sign in to your [employer] account. Enter L516 in the [place] FFT box to learn more about \"Leg and Ankle Edema: Care Instructions. \"     If you do not have an account, please click on the \"Sign Up Now\" link. Current as of: June 26, 2019               Content Version: 12.6  © 8876-6960 [employer]. Care instructions adapted under license by [facility] [facility] ([facility]). If you have questions about a medical condition or this instruction, always ask your healthcare professional. [place] [address] any warranty or liability for your use of this information. Patient Education        Hyperthyroidism: Care Instructions  Your Care Instructions  Hyperthyroidism occurs when the thyroid gland makes too much thyroid hormone. This speeds up your metabolismhow your body uses energy. This condition can cause you to be very active, lose weight, and have sleep problems, eye problems, and a fast heart rate. It can also cause a goiter. A goiter is an enlarged thyroid gland that you can see at the front of the neck. Hyperthyroidism is often caused by Graves' disease. In Graves' disease, the body's defense (immune) system attacks the thyroid gland. Your doctor may prescribe a beta-blocker medicine to slow your pulse and calm you down. But this is not a treatment for hyperthyroidism. It is given for your fast heart rate. Your doctor may also give you antithyroid medicine. This medicine keeps excess thyroid hormone in check. In some cases, doctors recommend radioactive iodine or surgery to remove the thyroid. After either of these treatments, you may need to take medicine to replace thyroid hormone for the rest of your life. Follow-up care is a key part of your treatment and safety. Be sure to make and go to all appointments, and call your doctor if you are having problems. It's also a good idea to know your test results and keep a list of the medicines you take. How can you care for yourself at home? · Take your medicines exactly as prescribed. You need to take the thyroid medicine at the same time each day. Call your doctor if you think you are having a problem with your medicine. · Graves' disease can make your eyes sore. Use artificial tears, eye drops, and sunglasses to protect your eyes from dryness, wind, and sun. Raise your head with pillows at night to prevent your eyes from swelling. In some cases, taping your eyelids shut at night will keep your eyes from being dry in the morning. · Make sure you get enough calcium. Foods that are rich in calcium include milk, yogurt, cheese, and dark green vegetables. · If you need to gain weight, ask your doctor about special diets. · Do not eat kelp. Aylin Justice is high in iodine, which can make hyperthyroidism worse. Aylin Justice is commonly used in testbirds and other [place] foods. You can use iodized salt and eat bread and seafood. Try to eat a balanced diet. · Do not use caffeine and other stimulants. These can make symptoms worse, such as a fast heartbeat, nervousness, and problems focusing. · Do not smoke. Smoking can make your condition worse and may lead to more serious eye problems. If you need help quitting, talk to your doctor about stop-smoking programs and medicines. These can increase your chances of quitting for good. · Lower your stress. Learn to use biofeedback, guided imagery, meditation, or other methods to relax. · Use creams or ointments for irritated skin. Ask your doctor which type to use. · Tell all your doctors about your condition. They need to know because some medicines contain iodine. When should you call for help? Call your doctor now or seek immediate medical care if:    · You have symptoms of a sudden, very high thyroid level (thyroid storm). These include:  ? Being nauseated, vomiting, and having diarrhea. ? Sweating a lot. ? Feeling extremely restless and confused. ? Having a high fever. ? Having a fast heartbeat.     · You have sudden vision changes or eye pain.     · You have a fever or severe sore throat and are taking antithyroid medicines, such as PTU or methimazole. Watch closely for changes in your health, and be sure to contact your doctor if:    · You have a sore throat or have problems swallowing.     · You have swollen, itchy, or red eyes or your other eye symptoms get worse, or you have new vision problems.     · You have signs of a low thyroid level (hypothyroidism). You may feel very tired, confused, or weak. Where can you learn more? Go to https://[employer].chung.[employer]. org and sign in to your [employer] account. Enter Y582 in the [employer] box to learn more about \"Hyperthyroidism: Care Instructions. \"     If you do not have an account, please click on the \"Sign Up Now\" link. Current as of: March 31, 2020               Content Version: 12.6  © 0818-9705 [employer]. Care instructions adapted under license by [facility] ([facility]). If you have questions about a medical condition or this instruction, always ask your healthcare professional. [address] any warranty or liability for your use of this information.

## 2021-02-22 ENCOUNTER — TELEPHONE (OUTPATIENT)
Dept: FAMILY MEDICINE CLINIC | Age: 71
End: 2021-02-22

## 2021-02-22 NOTE — TELEPHONE ENCOUNTER
patient has been taking the water pill since 2/4/21 and nothing has changed in her swelling and she also is not going to the bathroom like she thought she would also please advise if she is should still take it

## 2021-02-22 NOTE — TELEPHONE ENCOUNTER
She can stop taking the Lasix daily and take it only as needed. Please remind her to get her labs done before her next appointment.

## 2021-02-22 NOTE — TELEPHONE ENCOUNTER
She should continue to keep her feet elevated as much as possible, try to use compression stockings and try to minimize salt in her diet. Once she has her labs done, I can discuss further at the appointment.

## 2021-02-25 ENCOUNTER — HOSPITAL ENCOUNTER (OUTPATIENT)
Age: 71
Setting detail: SPECIMEN
Discharge: HOME | End: 2021-02-25
Payer: MEDICARE

## 2021-02-25 DIAGNOSIS — E05.90 SUBCLINICAL HYPERTHYROIDISM: ICD-10-CM

## 2021-02-25 DIAGNOSIS — I27.20 MILD PULMONARY HYPERTENSION (HCC): ICD-10-CM

## 2021-02-25 LAB
ANION GAP SERPL CALCULATED.3IONS-SCNC: 8 MMOL/L (ref 9–17)
BUN BLDV-MCNC: 14 MG/DL (ref 8–23)
BUN/CREAT BLD: ABNORMAL (ref 9–20)
CALCIUM SERPL-MCNC: 9.3 MG/DL (ref 8.6–10.4)
CHLORIDE BLD-SCNC: 106 MMOL/L (ref 98–107)
CO2: 24 MMOL/L (ref 20–31)
CREAT SERPL-MCNC: 0.63 MG/DL (ref 0.5–0.9)
GFR AFRICAN AMERICAN: >60 ML/MIN
GFR NON-AFRICAN AMERICAN: >60 ML/MIN
GFR SERPL CREATININE-BSD FRML MDRD: ABNORMAL ML/MIN/{1.73_M2}
GFR SERPL CREATININE-BSD FRML MDRD: ABNORMAL ML/MIN/{1.73_M2}
GLUCOSE BLD-MCNC: 94 MG/DL (ref 70–99)
POTASSIUM SERPL-SCNC: 4.5 MMOL/L (ref 3.7–5.3)
SODIUM BLD-SCNC: 138 MMOL/L (ref 135–144)
THYROXINE, FREE: 1.74 NG/DL (ref 0.93–1.7)
TSH SERPL DL<=0.05 MIU/L-ACNC: <0.01 MIU/L (ref 0.3–5)
VITAMIN D 25-HYDROXY: 16.7 NG/ML (ref 30–100)

## 2021-02-26 DIAGNOSIS — E05.90 HYPERTHYROIDISM: Primary | ICD-10-CM

## 2021-02-26 DIAGNOSIS — E55.9 VITAMIN D DEFICIENCY: ICD-10-CM

## 2021-02-26 RX ORDER — CHOLECALCIFEROL (VITAMIN D3) 125 MCG
1 CAPSULE ORAL DAILY
Qty: 30 TABLET | Refills: 2 | Status: SHIPPED | OUTPATIENT
Start: 2021-02-26 | End: 2021-05-06 | Stop reason: SDUPTHER

## 2021-03-04 ENCOUNTER — OFFICE VISIT (OUTPATIENT)
Dept: FAMILY MEDICINE CLINIC | Age: 71
End: 2021-03-04
Payer: MEDICARE

## 2021-03-04 VITALS
HEART RATE: 97 BPM | WEIGHT: 271.8 LBS | SYSTOLIC BLOOD PRESSURE: 128 MMHG | BODY MASS INDEX: 48.16 KG/M2 | DIASTOLIC BLOOD PRESSURE: 72 MMHG | HEIGHT: 63 IN | OXYGEN SATURATION: 93 % | TEMPERATURE: 96.7 F

## 2021-03-04 DIAGNOSIS — E05.90 HYPERTHYROIDISM: ICD-10-CM

## 2021-03-04 DIAGNOSIS — I10 ESSENTIAL HYPERTENSION: Primary | ICD-10-CM

## 2021-03-04 DIAGNOSIS — I26.99 BILATERAL PULMONARY EMBOLISM (HCC): ICD-10-CM

## 2021-03-04 DIAGNOSIS — R60.0 BILATERAL LEG EDEMA: ICD-10-CM

## 2021-03-04 PROBLEM — R03.0 ELEVATED BLOOD PRESSURE READING: Status: RESOLVED | Noted: 2021-01-04 | Resolved: 2021-03-04

## 2021-03-04 PROCEDURE — 99214 OFFICE O/P EST MOD 30 MIN: CPT | Performed by: STUDENT IN AN ORGANIZED HEALTH CARE EDUCATION/TRAINING PROGRAM

## 2021-03-04 RX ORDER — HYDROCHLOROTHIAZIDE 12.5 MG/1
12.5 CAPSULE, GELATIN COATED ORAL EVERY MORNING
Qty: 30 CAPSULE | Refills: 1 | Status: SHIPPED | OUTPATIENT
Start: 2021-03-04 | End: 2021-04-30

## 2021-03-04 RX ORDER — LISINOPRIL 5 MG/1
5 TABLET ORAL DAILY
Qty: 30 TABLET | Refills: 1 | Status: SHIPPED | OUTPATIENT
Start: 2021-03-04 | End: 2021-04-08

## 2021-03-04 ASSESSMENT — ENCOUNTER SYMPTOMS
ABDOMINAL PAIN: 0
COUGH: 0
DIARRHEA: 0
VOMITING: 0
CONSTIPATION: 0
SHORTNESS OF BREATH: 0
EYE DISCHARGE: 0
CHEST TIGHTNESS: 0
SORE THROAT: 0
NAUSEA: 0
WHEEZING: 0

## 2021-03-04 NOTE — PROGRESS NOTES
[address] PRIMARY CARE  [address] [place] [address] [address] [place] [address]  Dept: [phone]  Dept Fax: [phone]    Jason Almanza is a 65 y.o. female who is a Established patient, who presents today for her medical conditions/complaints as noted below:  Chief Complaint   Patient presents with    Hypertension    Discuss Labs         HPI:     She states that she is doing better and feeling stronger now. Her blood pressures at home have been running in the range of 120s -140s over 70s/80s. She has been taking lisinopril 5 mg daily without any issues. She still continues to have bilateral lower leg edema. She took Lasix and initially it helped but she says that she has not been seeing any difference with it anymore and stopped taking it. She was not able to get compression stockings yet. She has been on Xarelto for past 2 months, needs additional refill to complete 3 months of therapy. Denies any issues with bruising or bleeding. Her TSH was still low with high free T4. She has an ultrasound scheduled next week. Denies any anxiety, palpitations, tremors or diarrhea. Denies any mood issues.       No results found for: LABA1C          ( goal A1Cis < 7)   No results found for: LABMICR  LDL Cholesterol (mg/dL)   Date Value   01/04/2021 68       (goal LDL is <100)   AST (U/L)   Date Value   01/28/2021 20     ALT (U/L)   Date Value   01/28/2021 16     BUN (mg/dL)   Date Value   02/25/2021 14     BP Readings from Last 3 Encounters:   03/04/21 128/72   02/04/21 (!) 146/92   01/29/21 (!) 143/81          (goal 120/80)    Past Medical History:   Diagnosis Date    COPD (chronic obstructive pulmonary disease) ([facility] [address]     Disease of blood and blood forming organ     Hypertension       Past Surgical History:   Procedure Laterality Date    KNEE ARTHROSCOPY         Family History   Problem Relation Age of Onset    Cancer Mother     Kidney Disease Father        Social History Tobacco Use    Smoking status: Never Smoker    Smokeless tobacco: Never Used   Substance Use Topics    Alcohol use: Never     Frequency: Never      Current Outpatient Medications   Medication Sig Dispense Refill    Elastic Bandages & Supports (JOBST KNEE HIGH COMPRESSION SM) MISC 1 each by Does not apply route daily as needed (Leg edema) 1 each 0    hydroCHLOROthiazide (MICROZIDE) 12.5 MG capsule Take 1 capsule by mouth every morning 30 capsule 1    rivaroxaban (XARELTO) 20 MG TABS tablet Take 1 tablet by mouth daily 30 tablet 0    lisinopril (PRINIVIL;ZESTRIL) 5 MG tablet Take 1 tablet by mouth daily 30 tablet 1    Cholecalciferol (VITAMIN D3) 50 MCG (2000 UT) TABS Take 1 tablet by mouth daily 30 tablet 2    Elastic Bandages & Supports (MEDICAL COMPRESSION STOCKINGS) MISC 1 each by Does not apply route daily as needed (For leg edema) 1 each 0    Blood Pressure KIT Use to check BP twice a day 1 kit 0    lisinopril (PRINIVIL;ZESTRIL) 5 MG tablet Take 1 tablet by mouth daily 30 tablet 1     No current facility-administered medications for this visit.       No Known Allergies    Health Maintenance   Topic Date Due    COVID-19 Vaccine (1 of 2) Never done    Breast cancer screen  Never done    Colon cancer screen colonoscopy  Never done    DEXA (modify frequency per FRAX score)  Never done    Annual Wellness Visit (AWV)  Never done    Pneumococcal 65+ years Vaccine (1 of 1 - PPSV23) 03/12/2021 (Originally 5/25/2013)    Shingles Vaccine (1 of 2) 03/21/2021 (Originally 5/25/1998)    DTaP/Tdap/Td vaccine (1 - Tdap) 01/20/2022 (Originally 5/25/1967)    Flu vaccine (1) 01/20/2022 (Originally 9/1/2020)    TSH testing  02/25/2022    Potassium monitoring  02/25/2022    Creatinine monitoring  02/25/2022    Lipid screen  01/04/2026    Hepatitis C screen  Completed    Hepatitis A vaccine  Aged Out    Hepatitis B vaccine  Aged Out    Hib vaccine  Aged Out    Meningococcal (ACWY) vaccine  Aged Out Subjective:     Review of Systems   Constitutional: Negative for appetite change, fatigue and fever. HENT: Negative for congestion, ear pain, hearing loss and sore throat. Eyes: Negative for discharge and visual disturbance. Respiratory: Negative for cough, chest tightness, shortness of breath and wheezing. Cardiovascular: Positive for leg swelling. Negative for chest pain and palpitations. Gastrointestinal: Negative for abdominal pain, constipation, diarrhea, nausea and vomiting. Genitourinary: Negative for flank pain, frequency, hematuria and urgency. Musculoskeletal: Negative for arthralgias, gait problem, joint swelling and myalgias. Skin: Negative. Neurological: Negative for dizziness, weakness, numbness and headaches. Psychiatric/Behavioral: Negative. Negative for dysphoric mood. The patient is not nervous/anxious. Objective:     Physical Exam  Vitals signs reviewed. Constitutional:       Appearance: Normal appearance. She is normal weight. HENT:      Head: Normocephalic and atraumatic. Nose: Nose normal.      Mouth/Throat:      Mouth: Mucous membranes are moist.      Pharynx: Oropharynx is clear. Eyes:      Extraocular Movements: Extraocular movements intact. Conjunctiva/sclera: Conjunctivae normal.      Pupils: Pupils are equal, round, and reactive to light. Neck:      Musculoskeletal: Normal range of motion and neck supple. Cardiovascular:      Rate and Rhythm: Normal rate and regular rhythm. Heart sounds: Normal heart sounds. No murmur. No gallop. Pulmonary:      Effort: Pulmonary effort is normal. No respiratory distress. Breath sounds: Normal breath sounds. No stridor. No wheezing. Abdominal:      General: Bowel sounds are normal. There is no distension. Palpations: Abdomen is soft. Tenderness: There is no abdominal tenderness. There is no guarding or rebound. Musculoskeletal: Normal range of motion. General: No swelling or tenderness. Right lower leg: Edema present. Left lower leg: Edema present. Comments: 2+ pitting bilateral lower extremity edema   Skin:     General: Skin is warm and dry. Coloration: Skin is not jaundiced. Findings: No rash. Neurological:      General: No focal deficit present. Mental Status: She is alert and oriented to person, place, and time. Psychiatric:         Mood and Affect: Mood normal.         Behavior: Behavior normal.         Thought Content: Thought content normal.         Judgment: Judgment normal.       /72   Pulse 97   Temp 96.7 °F (35.9 °C)   Ht 5' 3\" (1.6 m)   Wt 271 lb 12.8 oz (123.3 kg)   SpO2 93%   BMI 48.15 kg/m²     Assessment/Plan:   1. Essential hypertension  -     hydroCHLOROthiazide (MICROZIDE) 12.5 MG capsule; Take 1 capsule by mouth every morning, Disp-30 capsule, R-1Normal  -     lisinopril (PRINIVIL;ZESTRIL) 5 MG tablet; Take 1 tablet by mouth daily, Disp-30 tablet, R-1Normal  2. Hyperthyroidism  -     Stacy Gloria MD, Endocrinology, [facility]; Future  3. Bilateral pulmonary embolism (HCC)  -     rivaroxaban (XARELTO) 20 MG TABS tablet; Take 1 tablet by mouth daily, Disp-30 tablet, R-0Normal  4. Bilateral leg edema  -     Elastic Bandages & Supports (JOBST KNEE HIGH COMPRESSION SM) MISC; DAILY PRN Starting Thu 3/4/2021, Disp-1 each, R-0, Normal    1. Hypertension-controlled. Started on hydrochlorothiazide 12.5 mg to help with diuresis. Continue lisinopril 5 mg daily. Advised to continue taking daily blood pressure readings. 2.  Hypothyroidism-TSH less than 0.01 with free T4 mildly elevated. Asymptomatic. Scheduled for ultrasound thyroid next week. Ordered nuclear medicine thyroid uptake scan. Referral placed for endocrinology. 3.  Bilateral pulmonary embolismcontinue Xarelto 20 mg daily to complete a 3 month course. 4.  Lower extremity edema-chronic, no relief with Lasix. Started on hydrochlorothiazide and prescribed compression stockings. Advised to keep legs elevated during the day and decrease salt in diet. Return in about 1 month (around 2021) for Follow up HTN. Orders Placed This Encounter   Procedures    NM THYROID UPTAKE AND SCAN     Standing Status:   Future     Standing Expiration Date:   3/4/2022     Order Specific Question:   Reason for exam:     Answer:   High scan   Kofi Roblero MD, Endocrinology, [facility]     Referral Priority:   Routine     Referral Type:   Eval and Treat     Referral Reason:   Specialty Services Required     Referred to Provider:   Roderick Ken MD     Requested Specialty:   Endocrinology     Number of Visits Requested:   1     Orders Placed This Encounter   Medications    Elastic Bandages & Supports (JOBST KNEE HIGH COMPRESSION SM) MISC     Si each by Does not apply route daily as needed (Leg edema)     Dispense:  1 each     Refill:  0    hydroCHLOROthiazide (MICROZIDE) 12.5 MG capsule     Sig: Take 1 capsule by mouth every morning     Dispense:  30 capsule     Refill:  1    rivaroxaban (XARELTO) 20 MG TABS tablet     Sig: Take 1 tablet by mouth daily     Dispense:  30 tablet     Refill:  0    lisinopril (PRINIVIL;ZESTRIL) 5 MG tablet     Sig: Take 1 tablet by mouth daily     Dispense:  30 tablet     Refill:  1       Patient given educational materials - see patient instructions. Discussed use, benefit, and side effects of prescribed medications. All patientquestions answered. Pt voiced understanding. Reviewed health maintenance. Instructedto continue current medications, diet and exercise. Patient agreed with treatmentplan. Follow up as directed.      Electronically signed by Nathan Gonzales MD on 3/4/2021 at 6:10 PM

## 2021-03-10 ENCOUNTER — TELEPHONE (OUTPATIENT)
Dept: FAMILY MEDICINE CLINIC | Age: 71
End: 2021-03-10

## 2021-03-10 NOTE — TELEPHONE ENCOUNTER
Please send the letter stating that she does not need oxygen tank and has been saturating well at room air.

## 2021-03-11 ENCOUNTER — HOSPITAL ENCOUNTER (OUTPATIENT)
Dept: NUCLEAR MEDICINE | Age: 71
Discharge: HOME | End: 2021-03-13
Payer: MEDICARE

## 2021-03-11 ENCOUNTER — HOSPITAL ENCOUNTER (OUTPATIENT)
Dept: ULTRASOUND IMAGING | Age: 71
Discharge: HOME | End: 2021-03-13
Payer: MEDICARE

## 2021-03-11 DIAGNOSIS — E05.90 HYPERTHYROIDISM: ICD-10-CM

## 2021-03-11 PROCEDURE — 76536 US EXAM OF HEAD AND NECK: CPT

## 2021-03-11 PROCEDURE — A9516 IODINE I-123 SOD IODIDE MIC: HCPCS

## 2021-03-11 PROCEDURE — 78014 THYROID IMAGING W/BLOOD FLOW: CPT

## 2021-03-11 PROCEDURE — 3430000000 HC RX DIAGNOSTIC RADIOPHARMACEUTICAL

## 2021-03-11 RX ADMIN — SODIUM IODIDE I 123 243 MICRO CURIE: 100 CAPSULE, GELATIN COATED ORAL at 08:15

## 2021-03-12 ENCOUNTER — HOSPITAL ENCOUNTER (OUTPATIENT)
Dept: NUCLEAR MEDICINE | Age: 71
Discharge: HOME | End: 2021-03-14
Payer: MEDICARE

## 2021-04-08 ENCOUNTER — OFFICE VISIT (OUTPATIENT)
Dept: FAMILY MEDICINE CLINIC | Age: 71
End: 2021-04-08
Payer: MEDICARE

## 2021-04-08 VITALS
OXYGEN SATURATION: 97 % | SYSTOLIC BLOOD PRESSURE: 134 MMHG | DIASTOLIC BLOOD PRESSURE: 80 MMHG | TEMPERATURE: 96.6 F | HEART RATE: 84 BPM | WEIGHT: 268 LBS | HEIGHT: 63 IN | BODY MASS INDEX: 47.48 KG/M2

## 2021-04-08 DIAGNOSIS — I10 ESSENTIAL HYPERTENSION: Primary | ICD-10-CM

## 2021-04-08 DIAGNOSIS — Z12.11 SCREEN FOR COLON CANCER: ICD-10-CM

## 2021-04-08 DIAGNOSIS — E05.90 HYPERTHYROIDISM: ICD-10-CM

## 2021-04-08 DIAGNOSIS — I26.99 BILATERAL PULMONARY EMBOLISM (HCC): ICD-10-CM

## 2021-04-08 DIAGNOSIS — F32.0 CURRENT MILD EPISODE OF MAJOR DEPRESSIVE DISORDER WITHOUT PRIOR EPISODE (HCC): ICD-10-CM

## 2021-04-08 PROCEDURE — 99214 OFFICE O/P EST MOD 30 MIN: CPT | Performed by: STUDENT IN AN ORGANIZED HEALTH CARE EDUCATION/TRAINING PROGRAM

## 2021-04-08 RX ORDER — SERTRALINE HYDROCHLORIDE 25 MG/1
25 TABLET, FILM COATED ORAL DAILY
Qty: 30 TABLET | Refills: 0 | Status: SHIPPED | OUTPATIENT
Start: 2021-04-08 | End: 2021-05-06 | Stop reason: ALTCHOICE

## 2021-04-08 NOTE — PROGRESS NOTES
[address] PRIMARY CARE  [address] [place] [address] [address] [place] [address]  Dept: [phone]  Dept Fax: [phone]    Amee Merritt is a 65 y.o. female who is a Established patient, who presents today for her medical conditions/complaints as noted below:  Chief Complaint   Patient presents with    Hypertension         HPI:     She is here today to follow-up on hypertension. Her blood pressures at home have been ranging in systolic 071-530 and diastolic 95-55 over past few weeks. She has completed the 3-month course of Xarelto and has stopped taking it. She is following with endocrinology for her hyperthyroidism and is currently on radioactive iodine treatment. She says that she feels emotionally exhausted and tired all the time. She had to deal with her father's loss, her own health and family situations. She feels like crying all the time. She has never had any mood issues before. She is still not sleeping well at night. She says that on days she is more active and is outside, she feels better and is able to sleep better as well. She still has swelling in her feet, and plans to buy compression stockings. She has tried diuretics in the past but they did not help her.       No results found for: LABA1C          ( goal A1Cis < 7)   No results found for: LABMICR  LDL Cholesterol (mg/dL)   Date Value   01/04/2021 68       (goal LDL is <100)   AST (U/L)   Date Value   01/28/2021 20     ALT (U/L)   Date Value   01/28/2021 16     BUN (mg/dL)   Date Value   02/25/2021 14     BP Readings from Last 3 Encounters:   04/08/21 134/80   03/04/21 128/72   02/04/21 (!) 146/92          (goal 120/80)    Past Medical History:   Diagnosis Date    Acute pulmonary embolism (HCC)     COPD (chronic obstructive pulmonary disease) (HCC)     Disease of blood and blood forming organ     Elevated blood pressure readings 1/4/2021    Hypertension       Past Surgical History:   Procedure Laterality Date  KNEE ARTHROSCOPY         Family History   Problem Relation Age of Onset    Cancer Mother     Kidney Disease Father        Social History     Tobacco Use    Smoking status: Never Smoker    Smokeless tobacco: Never Used   Substance Use Topics    Alcohol use: Never     Frequency: Never      Current Outpatient Medications   Medication Sig Dispense Refill    sertraline (ZOLOFT) 25 MG tablet Take 1 tablet by mouth daily 30 tablet 0    Elastic Bandages & Supports (JOBST KNEE HIGH COMPRESSION SM) MISC 1 each by Does not apply route daily as needed (Leg edema) 1 each 0    hydroCHLOROthiazide (MICROZIDE) 12.5 MG capsule Take 1 capsule by mouth every morning 30 capsule 1    Cholecalciferol (VITAMIN D3) 50 MCG (2000 UT) TABS Take 1 tablet by mouth daily 30 tablet 2    Elastic Bandages & Supports (MEDICAL COMPRESSION STOCKINGS) MISC 1 each by Does not apply route daily as needed (For leg edema) 1 each 0    Blood Pressure KIT Use to check BP twice a day 1 kit 0     No current facility-administered medications for this visit.       No Known Allergies    Health Maintenance   Topic Date Due    COVID-19 Vaccine (1) Never done    Breast cancer screen  Never done    Shingles Vaccine (1 of 2) Never done    Colon cancer screen colonoscopy  Never done    DEXA (modify frequency per FRAX score)  Never done    Annual Wellness Visit (AWV)  Never done    Pneumococcal 65+ years Vaccine (1 of 1 - PPSV23) 05/20/2021 (Originally 5/25/2013)    DTaP/Tdap/Td vaccine (1 - Tdap) 01/20/2022 (Originally 5/25/1967)    Flu vaccine (Season Ended) 01/20/2022 (Originally 9/1/2021)    TSH testing  02/25/2022    Potassium monitoring  02/25/2022    Creatinine monitoring  02/25/2022    Lipid screen  01/04/2026    Hepatitis C screen  Completed    Hepatitis A vaccine  Aged Out    Hepatitis B vaccine  Aged Out    Hib vaccine  Aged Out    Meningococcal (ACWY) vaccine  Aged Out       Subjective:     Review of Systems    Objective: Physical Exam  Vitals signs reviewed. Constitutional:       Appearance: Normal appearance. She is normal weight. HENT:      Head: Normocephalic and atraumatic. Nose: Nose normal.      Mouth/Throat:      Mouth: Mucous membranes are moist.      Pharynx: Oropharynx is clear. Eyes:      Extraocular Movements: Extraocular movements intact. Conjunctiva/sclera: Conjunctivae normal.      Pupils: Pupils are equal, round, and reactive to light. Neck:      Musculoskeletal: Normal range of motion and neck supple. Cardiovascular:      Rate and Rhythm: Normal rate and regular rhythm. Heart sounds: Normal heart sounds. No murmur. No gallop. Pulmonary:      Effort: Pulmonary effort is normal. No respiratory distress. Breath sounds: Normal breath sounds. No stridor. No wheezing. Abdominal:      General: Bowel sounds are normal. There is no distension. Palpations: Abdomen is soft. Tenderness: There is no abdominal tenderness. There is no guarding or rebound. Musculoskeletal: Normal range of motion. General: No swelling or tenderness. Skin:     General: Skin is warm and dry. Coloration: Skin is not jaundiced. Findings: No rash. Neurological:      General: No focal deficit present. Mental Status: She is alert and oriented to person, place, and time. Psychiatric:         Mood and Affect: Mood normal.         Behavior: Behavior normal.         Thought Content: Thought content normal.         Judgment: Judgment normal.       /80   Pulse 84   Temp 96.6 °F (35.9 °C)   Ht 5' 3\" (1.6 m)   Wt 268 lb (121.6 kg)   SpO2 97%   BMI 47.47 kg/m²     Assessment/Plan:   1. Essential hypertension  2. Current mild episode of major depressive disorder without prior episode (HCC)  -     sertraline (ZOLOFT) 25 MG tablet; Take 1 tablet by mouth daily, Disp-30 tablet, R-0Normal  3. Hyperthyroidism  4. Bilateral pulmonary embolism ([address]  5.  Screen for colon cancer  - Pedro (For External Results Only); Future      1. Hypertension-Home readings 110/80s. Discontinued lisinopril 5 mg today. Continue hydrochlorothiazide 12.5 mg daily. Advised to continue to monitor blood pressures at home. 2.  Depression-started on Zoloft 25 mg daily. Follow-up in 4 weeks. 3.  Hyperthyroidism-on radioactive iodine treatment. Following with endocrinology Dr. Gaby Blackwood. 3.  Pulmonary embolism-completed 3-month course of Xarelto. Return in about 1 month (around 5/8/2021) for Follow up anxiety/depression. Orders Placed This Encounter   Procedures    Colognino (For External Results Only)     This test is performed by an external laboratory and is used for result attachment only. It is required that this order requisition be faxed to: [employer] @ [phone]. See [email or web address] for further information. Standing Status:   Future     Standing Expiration Date:   4/8/2022     Orders Placed This Encounter   Medications    sertraline (ZOLOFT) 25 MG tablet     Sig: Take 1 tablet by mouth daily     Dispense:  30 tablet     Refill:  0       Patient given educational materials - see patient instructions. Discussed use, benefit, and side effects of prescribed medications. All patientquestions answered. Pt voiced understanding. Reviewed health maintenance. Instructedto continue current medications, diet and exercise. Patient agreed with treatmentplan. Follow up as directed.      Electronically signed by Hay Alonzo MD on 4/8/2021 at 4:37 PM

## 2021-04-17 ENCOUNTER — HOSPITAL ENCOUNTER (OUTPATIENT)
Dept: MAMMOGRAPHY | Age: 71
Discharge: HOME | End: 2021-04-19
Payer: MEDICARE

## 2021-04-17 DIAGNOSIS — Z12.31 ENCOUNTER FOR SCREENING MAMMOGRAM FOR MALIGNANT NEOPLASM OF BREAST: ICD-10-CM

## 2021-04-17 DIAGNOSIS — Z78.0 POSTMENOPAUSAL: ICD-10-CM

## 2021-04-17 PROCEDURE — 77063 BREAST TOMOSYNTHESIS BI: CPT

## 2021-04-17 PROCEDURE — 77080 DXA BONE DENSITY AXIAL: CPT

## 2021-04-19 ENCOUNTER — TELEPHONE (OUTPATIENT)
Dept: FAMILY MEDICINE CLINIC | Age: 71
End: 2021-04-19

## 2021-04-19 DIAGNOSIS — R92.8 ABNORMAL MAMMOGRAM OF LEFT BREAST: Primary | ICD-10-CM

## 2021-04-20 ENCOUNTER — HOSPITAL ENCOUNTER (OUTPATIENT)
Age: 71
Setting detail: SPECIMEN
Discharge: HOME | End: 2021-04-20
Payer: MEDICARE

## 2021-04-20 LAB
T3 FREE: 3.56 PG/ML (ref 2.02–4.43)
THYROXINE, FREE: 1.48 NG/DL (ref 0.93–1.7)
TSH SERPL DL<=0.05 MIU/L-ACNC: <0.01 MIU/L (ref 0.3–5)

## 2021-04-21 LAB
THYROGLOBULIN AB: <12 IU/ML (ref 0–40)
THYROID PEROXIDASE (TPO) AB: 5.1 IU/ML (ref 0–25)

## 2021-04-22 ENCOUNTER — HOSPITAL ENCOUNTER (OUTPATIENT)
Dept: MAMMOGRAPHY | Age: 71
Discharge: HOME | End: 2021-04-24
Payer: MEDICARE

## 2021-04-22 ENCOUNTER — HOSPITAL ENCOUNTER (OUTPATIENT)
Dept: ULTRASOUND IMAGING | Age: 71
Discharge: HOME | End: 2021-04-24
Payer: MEDICARE

## 2021-04-22 DIAGNOSIS — R92.8 ABNORMAL MAMMOGRAM OF LEFT BREAST: ICD-10-CM

## 2021-04-22 LAB — THYROID STIMULATING IMMUNOGLOB: <0.1 IU/L

## 2021-04-22 PROCEDURE — G0279 TOMOSYNTHESIS, MAMMO: HCPCS

## 2021-04-22 PROCEDURE — 76642 ULTRASOUND BREAST LIMITED: CPT

## 2021-04-30 DIAGNOSIS — I10 ESSENTIAL HYPERTENSION: ICD-10-CM

## 2021-04-30 RX ORDER — HYDROCHLOROTHIAZIDE 12.5 MG/1
CAPSULE, GELATIN COATED ORAL
Qty: 30 CAPSULE | Refills: 0 | Status: SHIPPED | OUTPATIENT
Start: 2021-04-30 | End: 2021-05-06 | Stop reason: SDUPTHER

## 2021-04-30 NOTE — TELEPHONE ENCOUNTER
extremity     Body mass index (BMI) 45.0-49.9, adult (HCC)      Hyperkalemia     Essential hypertension     Current mild episode of major depressive disorder without prior episode ([facility] [address]

## 2021-05-06 ENCOUNTER — OFFICE VISIT (OUTPATIENT)
Dept: FAMILY MEDICINE CLINIC | Age: 71
End: 2021-05-06
Payer: MEDICARE

## 2021-05-06 VITALS
WEIGHT: 267.6 LBS | DIASTOLIC BLOOD PRESSURE: 72 MMHG | BODY MASS INDEX: 47.41 KG/M2 | TEMPERATURE: 97.2 F | HEART RATE: 58 BPM | OXYGEN SATURATION: 96 % | SYSTOLIC BLOOD PRESSURE: 112 MMHG | HEIGHT: 63 IN

## 2021-05-06 DIAGNOSIS — E55.9 VITAMIN D DEFICIENCY: ICD-10-CM

## 2021-05-06 DIAGNOSIS — I10 ESSENTIAL HYPERTENSION: Primary | ICD-10-CM

## 2021-05-06 DIAGNOSIS — E05.90 HYPERTHYROIDISM: ICD-10-CM

## 2021-05-06 DIAGNOSIS — R60.0 BILATERAL LEG EDEMA: ICD-10-CM

## 2021-05-06 DIAGNOSIS — F32.0 CURRENT MILD EPISODE OF MAJOR DEPRESSIVE DISORDER WITHOUT PRIOR EPISODE (HCC): ICD-10-CM

## 2021-05-06 PROCEDURE — 99214 OFFICE O/P EST MOD 30 MIN: CPT | Performed by: STUDENT IN AN ORGANIZED HEALTH CARE EDUCATION/TRAINING PROGRAM

## 2021-05-06 RX ORDER — CHOLECALCIFEROL (VITAMIN D3) 125 MCG
1 CAPSULE ORAL DAILY
Qty: 30 TABLET | Refills: 2 | Status: SHIPPED | OUTPATIENT
Start: 2021-05-06 | End: 2021-07-26 | Stop reason: SDUPTHER

## 2021-05-06 RX ORDER — HYDROCHLOROTHIAZIDE 12.5 MG/1
CAPSULE, GELATIN COATED ORAL
Qty: 30 CAPSULE | Refills: 3 | Status: SHIPPED | OUTPATIENT
Start: 2021-05-06 | End: 2021-05-20 | Stop reason: ALTCHOICE

## 2021-05-06 ASSESSMENT — ENCOUNTER SYMPTOMS
CHEST TIGHTNESS: 0
COUGH: 0
DIARRHEA: 0
CONSTIPATION: 0
SORE THROAT: 0
WHEEZING: 0
VOMITING: 0
NAUSEA: 0
SHORTNESS OF BREATH: 0
EYE DISCHARGE: 0
ABDOMINAL PAIN: 0

## 2021-05-06 NOTE — PROGRESS NOTES
[address] PRIMARY CARE  [address] [place] [address] [address] [place] [address]  Dept: [phone]  Dept Fax: [phone]    Jim Merrill is a 65 y.o. female who is a Established patient, who presents today for her medical conditions/complaints as noted below:  Chief Complaint   Patient presents with    Depression    Anxiety         HPI:     She is here today to follow-up on hypertension and anxiety. She has been taking hydrochlorothiazide 12.5 mg daily which has been helping a little bit with her lower extremity edema. Her blood pressures have been well controlled. She brought her home blood pressure readings today and most of her readings are between 034351 systolic and 5995 diastolic. She denies any lightheadedness, dizziness or headaches. She started Zoloft for her anxiety but it made her feel very nauseous and she lost her appetite completely on it. So she stopped taking it after a week. She does not want to be on any other medication and wants to try coping with anxiety on her own. She had a recent abnormal mammogram which was followed by a diagnostic mammogram and ultrasound. The results showed benign lesion with a close follow-up in 6 months. She is a little bit anxious about the results because she has a strong family history of breast cancer. Will consider the option of getting biopsy now versus waiting for 6 months for repeat imaging. Denies any symptoms. She has sent her Cologuard test kit and still awaiting results. She is following with endocrinology Dr. Lydia Oneal for hyperthyroidism, her TSH was still very low despite taking radioactive iodine. She has a follow-up next month.       No results found for: LABA1C          ( goal A1Cis < 7)   No results found for: LABMICR  LDL Cholesterol (mg/dL)   Date Value   01/04/2021 68       (goal LDL is <100)   AST (U/L)   Date Value   01/28/2021 20     ALT (U/L)   Date Value   01/28/2021 16     BUN (mg/dL)   Date Value 02/25/2021 14     BP Readings from Last 3 Encounters:   05/06/21 112/72   04/08/21 134/80   03/04/21 128/72          (goal 120/80)    Past Medical History:   Diagnosis Date    Acute pulmonary embolism (HCC)     COPD (chronic obstructive pulmonary disease) (HCC)     Disease of blood and blood forming organ     Elevated blood pressure readings 1/4/2021    Hypertension       Past Surgical History:   Procedure Laterality Date    KNEE ARTHROSCOPY         Family History   Problem Relation Age of Onset    Cancer Mother     Kidney Disease Father        Social History     Tobacco Use    Smoking status: Never Smoker    Smokeless tobacco: Never Used   Substance Use Topics    Alcohol use: Never     Frequency: Never      Current Outpatient Medications   Medication Sig Dispense Refill    Cholecalciferol (VITAMIN D3) 50 MCG (2000 UT) TABS Take 1 tablet by mouth daily 30 tablet 2    hydroCHLOROthiazide (MICROZIDE) 12.5 MG capsule TAKE ONE CAPSULE BY MOUTH EVERY MORNING 30 capsule 3    Blood Pressure KIT Use to check BP twice a day 1 kit 0     No current facility-administered medications for this visit.       No Known Allergies    Health Maintenance   Topic Date Due    COVID-19 Vaccine (1) Never done    Colon cancer screen colonoscopy  Never done    Annual Wellness Visit (AWV)  Never done    Pneumococcal 65+ years Vaccine (1 of 1 - PPSV23) 05/20/2021 (Originally 5/25/2013)    DTaP/Tdap/Td vaccine (1 - Tdap) 01/20/2022 (Originally 5/25/1967)    Flu vaccine (Season Ended) 01/20/2022 (Originally 9/1/2021)    Shingles Vaccine (1 of 2) 05/06/2022 (Originally 5/25/1998)    Potassium monitoring  02/25/2022    Creatinine monitoring  02/25/2022    TSH testing  04/20/2022    Breast cancer screen  04/22/2023    Lipid screen  01/04/2026    DEXA (modify frequency per FRAX score)  Completed    Hepatitis C screen  Completed    Hepatitis A vaccine  Aged Out    Hepatitis B vaccine  Aged Out    Hib vaccine  Aged C/ Rubin [address] Meningococcal (ACWY) vaccine  Aged Out       Subjective:     Review of Systems   Constitutional: Negative for appetite change, fatigue and fever. HENT: Negative for congestion, ear pain, hearing loss and sore throat. Eyes: Negative for discharge and visual disturbance. Respiratory: Negative for cough, chest tightness, shortness of breath and wheezing. Cardiovascular: Positive for leg swelling. Negative for chest pain and palpitations. Gastrointestinal: Negative for abdominal pain, constipation, diarrhea, nausea and vomiting. Genitourinary: Negative for flank pain, frequency, hematuria and urgency. Musculoskeletal: Negative for arthralgias, gait problem, joint swelling and myalgias. Skin: Negative. Neurological: Negative for dizziness, weakness, numbness and headaches. Psychiatric/Behavioral: Negative. Negative for dysphoric mood. The patient is not nervous/anxious. Objective:     Physical Exam  Vitals signs reviewed. Constitutional:       Appearance: Normal appearance. She is normal weight. HENT:      Head: Normocephalic and atraumatic. Nose: Nose normal.      Mouth/Throat:      Mouth: Mucous membranes are moist.      Pharynx: Oropharynx is clear. Eyes:      Extraocular Movements: Extraocular movements intact. Conjunctiva/sclera: Conjunctivae normal.      Pupils: Pupils are equal, round, and reactive to light. Neck:      Musculoskeletal: Normal range of motion and neck supple. Cardiovascular:      Rate and Rhythm: Normal rate and regular rhythm. Heart sounds: Normal heart sounds. No murmur. No gallop. Pulmonary:      Effort: Pulmonary effort is normal. No respiratory distress. Breath sounds: Normal breath sounds. No stridor. No wheezing. Abdominal:      General: Bowel sounds are normal. There is no distension. Palpations: Abdomen is soft. Tenderness: There is no abdominal tenderness. There is no guarding or rebound.    Musculoskeletal: Normal Cholecalciferol (VITAMIN D3) 50 MCG (2000 UT) TABS     Sig: Take 1 tablet by mouth daily     Dispense:  30 tablet     Refill:  2    hydroCHLOROthiazide (MICROZIDE) 12.5 MG capsule     Sig: TAKE ONE CAPSULE BY MOUTH EVERY MORNING     Dispense:  30 capsule     Refill:  3       Patient given educational materials - see patient instructions. Discussed use, benefit, and side effects of prescribed medications. All patientquestions answered. Pt voiced understanding. Reviewed health maintenance. Instructedto continue current medications, diet and exercise. Patient agreed with treatmentplan. Follow up as directed.      Electronically signed by Cesar Lemus MD on 5/6/2021 at 4:51 PM

## 2021-05-11 DIAGNOSIS — Z12.11 SCREEN FOR COLON CANCER: ICD-10-CM

## 2021-05-20 ENCOUNTER — HOSPITAL ENCOUNTER (OUTPATIENT)
Age: 71
Setting detail: SPECIMEN
Discharge: HOME | End: 2021-05-20
Payer: MEDICARE

## 2021-05-20 ENCOUNTER — OFFICE VISIT (OUTPATIENT)
Dept: FAMILY MEDICINE CLINIC | Age: 71
End: 2021-05-20
Payer: MEDICARE

## 2021-05-20 VITALS
SYSTOLIC BLOOD PRESSURE: 124 MMHG | OXYGEN SATURATION: 98 % | WEIGHT: 264 LBS | HEART RATE: 77 BPM | BODY MASS INDEX: 48.58 KG/M2 | HEIGHT: 62 IN | TEMPERATURE: 97.3 F | DIASTOLIC BLOOD PRESSURE: 76 MMHG

## 2021-05-20 DIAGNOSIS — F32.0 CURRENT MILD EPISODE OF MAJOR DEPRESSIVE DISORDER WITHOUT PRIOR EPISODE (HCC): ICD-10-CM

## 2021-05-20 DIAGNOSIS — E05.90 HYPERTHYROIDISM: ICD-10-CM

## 2021-05-20 DIAGNOSIS — I10 ESSENTIAL HYPERTENSION: ICD-10-CM

## 2021-05-20 DIAGNOSIS — Z00.00 ROUTINE GENERAL MEDICAL EXAMINATION AT A HEALTH CARE FACILITY: Primary | ICD-10-CM

## 2021-05-20 LAB
T3 FREE: 3.36 PG/ML (ref 2.02–4.43)
THYROXINE, FREE: 1.29 NG/DL (ref 0.93–1.7)

## 2021-05-20 PROCEDURE — 1036F TOBACCO NON-USER: CPT | Performed by: STUDENT IN AN ORGANIZED HEALTH CARE EDUCATION/TRAINING PROGRAM

## 2021-05-20 PROCEDURE — G8399 PT W/DXA RESULTS DOCUMENT: HCPCS | Performed by: STUDENT IN AN ORGANIZED HEALTH CARE EDUCATION/TRAINING PROGRAM

## 2021-05-20 PROCEDURE — 3017F COLORECTAL CA SCREEN DOC REV: CPT | Performed by: STUDENT IN AN ORGANIZED HEALTH CARE EDUCATION/TRAINING PROGRAM

## 2021-05-20 PROCEDURE — G8417 CALC BMI ABV UP PARAM F/U: HCPCS | Performed by: STUDENT IN AN ORGANIZED HEALTH CARE EDUCATION/TRAINING PROGRAM

## 2021-05-20 PROCEDURE — G0438 PPPS, INITIAL VISIT: HCPCS | Performed by: STUDENT IN AN ORGANIZED HEALTH CARE EDUCATION/TRAINING PROGRAM

## 2021-05-20 PROCEDURE — 1123F ACP DISCUSS/DSCN MKR DOCD: CPT | Performed by: STUDENT IN AN ORGANIZED HEALTH CARE EDUCATION/TRAINING PROGRAM

## 2021-05-20 PROCEDURE — G8427 DOCREV CUR MEDS BY ELIG CLIN: HCPCS | Performed by: STUDENT IN AN ORGANIZED HEALTH CARE EDUCATION/TRAINING PROGRAM

## 2021-05-20 PROCEDURE — 4040F PNEUMOC VAC/ADMIN/RCVD: CPT | Performed by: STUDENT IN AN ORGANIZED HEALTH CARE EDUCATION/TRAINING PROGRAM

## 2021-05-20 PROCEDURE — 1090F PRES/ABSN URINE INCON ASSESS: CPT | Performed by: STUDENT IN AN ORGANIZED HEALTH CARE EDUCATION/TRAINING PROGRAM

## 2021-05-20 PROCEDURE — 99214 OFFICE O/P EST MOD 30 MIN: CPT | Performed by: STUDENT IN AN ORGANIZED HEALTH CARE EDUCATION/TRAINING PROGRAM

## 2021-05-20 RX ORDER — HYDROCHLOROTHIAZIDE 25 MG/1
25 TABLET ORAL EVERY MORNING
Qty: 30 TABLET | Refills: 2 | Status: SHIPPED | OUTPATIENT
Start: 2021-05-20 | End: 2021-06-24 | Stop reason: SDUPTHER

## 2021-05-20 RX ORDER — ESCITALOPRAM OXALATE 10 MG/1
10 TABLET ORAL DAILY
Qty: 30 TABLET | Refills: 1 | Status: SHIPPED | OUTPATIENT
Start: 2021-05-20 | End: 2021-06-09 | Stop reason: ALTCHOICE

## 2021-05-20 SDOH — ECONOMIC STABILITY: FOOD INSECURITY: WITHIN THE PAST 12 MONTHS, YOU WORRIED THAT YOUR FOOD WOULD RUN OUT BEFORE YOU GOT MONEY TO BUY MORE.: NEVER TRUE

## 2021-05-20 ASSESSMENT — LIFESTYLE VARIABLES
AUDIT TOTAL SCORE: 2
HOW OFTEN DURING THE LAST YEAR HAVE YOU FOUND THAT YOU WERE NOT ABLE TO STOP DRINKING ONCE YOU HAD STARTED: 0
HAS A RELATIVE, FRIEND, DOCTOR, OR ANOTHER HEALTH PROFESSIONAL EXPRESSED CONCERN ABOUT YOUR DRINKING OR SUGGESTED YOU CUT DOWN: 0
HAVE YOU OR SOMEONE ELSE BEEN INJURED AS A RESULT OF YOUR DRINKING: 0
AUDIT-C TOTAL SCORE: 2
HOW OFTEN DURING THE LAST YEAR HAVE YOU FAILED TO DO WHAT WAS NORMALLY EXPECTED FROM YOU BECAUSE OF DRINKING: 0

## 2021-05-20 ASSESSMENT — PATIENT HEALTH QUESTIONNAIRE - PHQ9
SUM OF ALL RESPONSES TO PHQ QUESTIONS 1-9: 2
SUM OF ALL RESPONSES TO PHQ QUESTIONS 1-9: 2
2. FEELING DOWN, DEPRESSED OR HOPELESS: 1
1. LITTLE INTEREST OR PLEASURE IN DOING THINGS: 1

## 2021-05-20 NOTE — PATIENT INSTRUCTIONS
Personalized Preventive Plan for [facility] 5/20/2021  Medicare offers a range of preventive health benefits. Some of the tests and screenings are paid in full while other may be subject to a deductible, co-insurance, and/or copay. Some of these benefits include a comprehensive review of your medical history including lifestyle, illnesses that may run in your family, and various assessments and screenings as appropriate. After reviewing your medical record and screening and assessments performed today your provider may have ordered immunizations, labs, imaging, and/or referrals for you. A list of these orders (if applicable) as well as your Preventive Care list are included within your After Visit Summary for your review. Other Preventive Recommendations:    · A preventive eye exam performed by an eye specialist is recommended every 1-2 years to screen for glaucoma; cataracts, macular degeneration, and other eye disorders. · A preventive dental visit is recommended every 6 months. · Try to get at least 150 minutes of exercise per week or 10,000 steps per day on a pedometer . · Order or download the FREE \"Exercise & Physical Activity: Your Everyday Guide\" from [employer]. Call 2-264.569.2476 or search [employer] online. · You need 8222-6980 mg of calcium and 1327-4755 IU of vitamin D per day. It is possible to meet your calcium requirement with diet alone, but a vitamin D supplement is usually necessary to meet this goal.  · When exposed to the sun, use a sunscreen that protects against both UVA and UVB radiation with an SPF of 30 or greater. Reapply every 2 to 3 hours or after sweating, drying off with a towel, or swimming. · Always wear a seat belt when traveling in a car. Always wear a helmet when riding a bicycle or motorcycle.

## 2021-05-20 NOTE — PROGRESS NOTES
Medicare Annual Wellness Visit  Name: Felipe Awan Date: 2021   MRN: [id number] Sex: Female   Age: 65 y.o. Ethnicity: Non-/Non    : 1950 Race: Aracelis Dunaway is here for Medicare AWV    Screenings for behavioral, psychosocial and functional/safety risks, and cognitive dysfunction are all negative except as indicated below. These results, as well as other patient data from the [address] [facility] [address] form, are documented in Flowsheets linked to this Encounter. No Known Allergies      Prior to Visit Medications    Medication Sig Taking? Authorizing Provider   hydroCHLOROthiazide (HYDRODIURIL) 25 MG tablet Take 1 tablet by mouth every morning Yes Luis Taylor MD   escitalopram (LEXAPRO) 10 MG tablet Take 1 tablet by mouth daily Yes Luis Taylor MD   Cholecalciferol (VITAMIN D3) 50 MCG (2000) TABS Take 1 tablet by mouth daily Yes Luis Taylor MD   Blood Pressure KIT Use to check BP twice a day Yes Luis Taylor MD         Past Medical History:   Diagnosis Date    [facility] [address]     COPD (chronic obstructive pulmonary disease) ([facility] [address] [facility]     Disease of blood and blood forming organ     Elevated blood pressure readings 2021    Hypertension        Past Surgical History:   Procedure Laterality Date    KNEE ARTHROSCOPY           Family History   Problem Relation Age of Onset    Cancer Mother     Kidney Disease Father        CareTeam (Including outside providers/suppliers regularly involved in providing care):   Patient Care Team:  Luis Taylor MD as PCP - General (Family Medicine)    Wt Readings from Last 3 Encounters:   21 264 lb (119.7 kg)   21 267 lb 9.6 oz (121.4 kg)   21 268 lb (121.6 kg)     Vitals:    21 1530   BP: 124/76   Pulse: 77   Temp: 97.3 °F (36.3 °C)   SpO2: 98%   Weight: 264 lb (119.7 kg)   Height: 5' 2\" (1.575 m)     Body mass index is 48.29 kg/m².     Based upon direct observation of the patient, evaluation of cognition reveals recent and remote memory intact. General Appearance: alert and oriented to person, place and time, well developed and well- nourished, in no acute distress  Skin: warm and dry, no rash or erythema  Head: normocephalic and atraumatic  Eyes: pupils equal, round, and reactive to light, extraocular eye movements intact, conjunctivae normal  ENT: tympanic membrane, external ear and ear canal normal bilaterally, nose without deformity, nasal mucosa and turbinates normal without polyps  Neck: supple and non-tender without mass, no thyromegaly or thyroid nodules, no cervical lymphadenopathy  Pulmonary/Chest: clear to auscultation bilaterally- no wheezes, rales or rhonchi, normal air movement, no respiratory distress  Cardiovascular: normal rate, regular rhythm, normal S1 and S2, no murmurs, rubs, clicks, or gallops, distal pulses intact, no carotid bruits  Abdomen: soft, non-tender, non-distended, normal bowel sounds, no masses or organomegaly  Extremities: no cyanosis, clubbing or edema  Musculoskeletal: normal range of motion, no joint swelling, deformity or tenderness  Neurologic: reflexes normal and symmetric, no cranial nerve deficit, gait, coordination and speech normal    Patient's complete Health Risk Assessment and screening values have been reviewed and are found in Flowsheets. The following problems were reviewed today and where indicated follow up appointments were made and/or referrals ordered. Positive Risk Factor Screenings with Interventions:            General Health and ACP:  General  In general, how would you say your health is?: Fair  In the past 7 days, have you experienced any of the following?  New or Increased Pain, New or Increased Fatigue, Loneliness, Social Isolation, Stress or Anger?: (!) Loneliness  Do you get the social and emotional support that you need?: Yes  Do you have a Living Will?: (!) No  Advance Directives     Power of [address] [place] [address] Will vaccine  Aged Out     Recommendations for [employer] Due: see orders and patient instructions/AVS.  . Recommended screening schedule for the next 5-10 years is provided to the patient in written form: see Patient Instructions/AVS.    Natalya Cazares was seen today for medicare aw. Diagnoses and all orders for this visit:    Routine general medical examination at a health care facility    Essential hypertension  -     hydroCHLOROthiazide (HYDRODIURIL) 25 MG tablet; Take 1 tablet by mouth every morning    Increased hydrochlorothiazide to 25 mg daily due to persistent leg swelling. Was not able to get appropriate sized compression stockings. Advised to continue to monitor blood pressures. Current mild episode of major depressive disorder without prior episode (HCC)  -     escitalopram (LEXAPRO) 10 MG tablet; Take 1 tablet by mouth daily    Worsening mood and anxiety due to health issues. She is getting frustrated due to not having her thyroid controlled or leg swelling improved despite trying very hard. Could not tolerate Zoloft. Started on Lexapro 10 mg daily and will reassess in 4 weeks. Hyperthyroidism    Persistently low TSH levels despite starting on radioactive iodine treatment. Has a follow-up scheduled with endocrinologist in next month. Cardiovascular Disease Risk Counseling: Assessed the patient's risk to develop cardiovascular disease and reviewed main risk factors.    Reviewed steps to reduce disease risk including:   · Quitting tobacco use, reducing amount smoked, or not starting the habit  · Making healthy food choices  · Being physically active and gradualy increasing activity levels   · Reduce weight and determine a healthy BMI goal  · Monitor blood pressure and treat if higher than 140/90 mmHg  · Maintain blood total cholesterol levels under 5 mmol/l or 190 mg/dl  · Maintain LDL cholesterol levels under 3.0 mmol/l or 115 mg/dl   · Control blood glucose levels  · Consider taking aspirin (75 mg daily), once blood pressure is controlled   Provided a follow up plan.   Time spent (minutes): 15 minutes

## 2021-05-21 LAB — TSH SERPL DL<=0.05 MIU/L-ACNC: <0.01 MIU/L (ref 0.3–5)

## 2021-06-08 ENCOUNTER — TELEPHONE (OUTPATIENT)
Dept: FAMILY MEDICINE CLINIC | Age: 71
End: 2021-06-08

## 2021-06-08 NOTE — TELEPHONE ENCOUNTER
Patient called in and said this office was suppose to call her to schedule an appt for a month from her last and she said she didn't get a call. We went ahead and made and appt but she is requesting an earlier appt than 6/29. Patient said she lost her son and is wanting to speak to Dr. Kurt kirkpatrick. If someone could follow up with patient if she is able to be seen for an earlier appt.  Thanks

## 2021-06-08 NOTE — TELEPHONE ENCOUNTER
Patient sate that she lost her son.  And she is very down and depress, she is wondering if she could increase her lexapro or could she make a sooner appt then the 29th to speak with you in regards to the increase

## 2021-06-09 ENCOUNTER — OFFICE VISIT (OUTPATIENT)
Dept: FAMILY MEDICINE CLINIC | Age: 71
End: 2021-06-09
Payer: MEDICARE

## 2021-06-09 VITALS
DIASTOLIC BLOOD PRESSURE: 82 MMHG | BODY MASS INDEX: 50.24 KG/M2 | WEIGHT: 273 LBS | HEIGHT: 62 IN | SYSTOLIC BLOOD PRESSURE: 130 MMHG | TEMPERATURE: 97.3 F | HEART RATE: 76 BPM | OXYGEN SATURATION: 97 %

## 2021-06-09 DIAGNOSIS — I10 ESSENTIAL HYPERTENSION: ICD-10-CM

## 2021-06-09 DIAGNOSIS — E05.90 HYPERTHYROIDISM: ICD-10-CM

## 2021-06-09 DIAGNOSIS — F32.0 CURRENT MILD EPISODE OF MAJOR DEPRESSIVE DISORDER WITHOUT PRIOR EPISODE (HCC): Primary | ICD-10-CM

## 2021-06-09 DIAGNOSIS — Z63.4 RECENT BEREAVEMENT: ICD-10-CM

## 2021-06-09 PROCEDURE — 3017F COLORECTAL CA SCREEN DOC REV: CPT | Performed by: STUDENT IN AN ORGANIZED HEALTH CARE EDUCATION/TRAINING PROGRAM

## 2021-06-09 PROCEDURE — G8399 PT W/DXA RESULTS DOCUMENT: HCPCS | Performed by: STUDENT IN AN ORGANIZED HEALTH CARE EDUCATION/TRAINING PROGRAM

## 2021-06-09 PROCEDURE — 4040F PNEUMOC VAC/ADMIN/RCVD: CPT | Performed by: STUDENT IN AN ORGANIZED HEALTH CARE EDUCATION/TRAINING PROGRAM

## 2021-06-09 PROCEDURE — 1036F TOBACCO NON-USER: CPT | Performed by: STUDENT IN AN ORGANIZED HEALTH CARE EDUCATION/TRAINING PROGRAM

## 2021-06-09 PROCEDURE — 1090F PRES/ABSN URINE INCON ASSESS: CPT | Performed by: STUDENT IN AN ORGANIZED HEALTH CARE EDUCATION/TRAINING PROGRAM

## 2021-06-09 PROCEDURE — 1123F ACP DISCUSS/DSCN MKR DOCD: CPT | Performed by: STUDENT IN AN ORGANIZED HEALTH CARE EDUCATION/TRAINING PROGRAM

## 2021-06-09 PROCEDURE — G8417 CALC BMI ABV UP PARAM F/U: HCPCS | Performed by: STUDENT IN AN ORGANIZED HEALTH CARE EDUCATION/TRAINING PROGRAM

## 2021-06-09 PROCEDURE — 99214 OFFICE O/P EST MOD 30 MIN: CPT | Performed by: STUDENT IN AN ORGANIZED HEALTH CARE EDUCATION/TRAINING PROGRAM

## 2021-06-09 PROCEDURE — G8427 DOCREV CUR MEDS BY ELIG CLIN: HCPCS | Performed by: STUDENT IN AN ORGANIZED HEALTH CARE EDUCATION/TRAINING PROGRAM

## 2021-06-09 RX ORDER — ASPIRIN 81 MG/1
81 TABLET ORAL DAILY
Qty: 90 TABLET | Refills: 1 | Status: SHIPPED | OUTPATIENT
Start: 2021-06-09 | End: 2021-12-02

## 2021-06-09 RX ORDER — ESCITALOPRAM OXALATE 20 MG/1
20 TABLET ORAL DAILY
Qty: 30 TABLET | Refills: 3 | Status: SHIPPED | OUTPATIENT
Start: 2021-06-09 | End: 2021-07-26 | Stop reason: SDUPTHER

## 2021-06-09 SDOH — SOCIAL STABILITY - SOCIAL INSECURITY: DISSAPEARANCE AND DEATH OF FAMILY MEMBER: Z63.4

## 2021-06-09 NOTE — PROGRESS NOTES
[address] PRIMARY CARE  [address] [place] [address] [address] [place] [address]  Dept: [phone]  Dept Fax: [phone]    Reji Bee is a 66 y.o. female who is a Established patient, who presents today for her medical conditions/complaints as noted below:  Chief Complaint   Patient presents with    Depression     due to grief    Anxiety         HPI:     She just lost her oldest son about 2 weeks ago and has been dealing with intense grief and depressed mood. Her son was a war  and  of alcoholism. She feels that he was having mental health issues which led to alcoholism. She is not able to go out or participate in any social activities. She says that she just wants to stay at home or spend time with her daughter Sathish Olvera who has been taking care of her. She feels that the Lexapro is not enough at the current dose and is agreeable to increasing the dose. Denies any issues with sleep. Denies any thoughts of self-harm. Her son was living in [place] and she drove there last week with her daughter. Due to her previous history of having pulmonary embolism after a long car drive, they took frequent breaks and got out of the car and walked around. Her daughter is still worried about her getting another blood clot if she happens to sit in a long car ride again.       No results found for: LABA1C          ( goal A1Cis < 7)   No results found for: LABMICR  LDL Cholesterol (mg/dL)   Date Value   2021 68       (goal LDL is <100)   AST (U/L)   Date Value   2021 20     ALT (U/L)   Date Value   2021 16     BUN (mg/dL)   Date Value   2021 14     BP Readings from Last 3 Encounters:   21 130/82   21 124/76   21 112/72          (goal 120/80)    Past Medical History:   Diagnosis Date    Acute pulmonary embolism (HCC)     COPD (chronic obstructive pulmonary disease) (HCC)     Disease of blood and blood forming organ     Elevated blood pressure readings 1/4/2021    Hypertension       Past Surgical History:   Procedure Laterality Date    KNEE ARTHROSCOPY         Family History   Problem Relation Age of Onset    Cancer Mother     Kidney Disease Father        Social History     Tobacco Use    Smoking status: Never Smoker    Smokeless tobacco: Never Used   Substance Use Topics    Alcohol use: Never      Current Outpatient Medications   Medication Sig Dispense Refill    escitalopram (LEXAPRO) 20 MG tablet Take 1 tablet by mouth daily 30 tablet 3    aspirin EC 81 MG EC tablet Take 1 tablet by mouth daily 90 tablet 1    hydroCHLOROthiazide (HYDRODIURIL) 25 MG tablet Take 1 tablet by mouth every morning 30 tablet 2    Cholecalciferol (VITAMIN D3) 50 MCG (2000 UT) TABS Take 1 tablet by mouth daily 30 tablet 2    Blood Pressure KIT Use to check BP twice a day 1 kit 0     No current facility-administered medications for this visit.      No Known Allergies    Health Maintenance   Topic Date Due    COVID-19 Vaccine (1) Never done    Pneumococcal 65+ years Vaccine (1 of 1 - PPSV23) 07/09/2021 (Originally 5/25/2013)    DTaP/Tdap/Td vaccine (1 - Tdap) 01/20/2022 (Originally 5/25/1967)    Flu vaccine (Season Ended) 01/20/2022 (Originally 9/1/2021)    Shingles Vaccine (1 of 2) 05/06/2022 (Originally 5/25/1998)    Potassium monitoring  02/25/2022    Creatinine monitoring  02/25/2022    TSH testing  05/20/2022    Annual Wellness Visit (AWV)  05/21/2022    Breast cancer screen  04/22/2023    Colon cancer screen fecal DNA test (Cologuard)  05/04/2024    Lipid screen  01/04/2026    DEXA (modify frequency per FRAX score)  Completed    Hepatitis C screen  Completed    Hepatitis A vaccine  Aged Out    Hepatitis B vaccine  Aged Out    Hib vaccine  Aged Out    Meningococcal (ACWY) vaccine  Aged Out       Subjective:     Review of Systems    Objective:     Physical Exam  /82   Pulse 76   Temp 97.3 °F (36.3 °C)   Ht 5' 2\" (1.575 m)   Wt 273 lb

## 2021-06-24 ENCOUNTER — OFFICE VISIT (OUTPATIENT)
Dept: FAMILY MEDICINE CLINIC | Age: 71
End: 2021-06-24
Payer: MEDICARE

## 2021-06-24 VITALS
HEIGHT: 62 IN | OXYGEN SATURATION: 96 % | DIASTOLIC BLOOD PRESSURE: 76 MMHG | TEMPERATURE: 97.3 F | SYSTOLIC BLOOD PRESSURE: 120 MMHG | HEART RATE: 71 BPM | BODY MASS INDEX: 49.13 KG/M2 | WEIGHT: 267 LBS

## 2021-06-24 DIAGNOSIS — E05.90 HYPERTHYROIDISM: ICD-10-CM

## 2021-06-24 DIAGNOSIS — F32.0 CURRENT MILD EPISODE OF MAJOR DEPRESSIVE DISORDER WITHOUT PRIOR EPISODE (HCC): Primary | ICD-10-CM

## 2021-06-24 DIAGNOSIS — I10 ESSENTIAL HYPERTENSION: ICD-10-CM

## 2021-06-24 PROCEDURE — 4040F PNEUMOC VAC/ADMIN/RCVD: CPT | Performed by: STUDENT IN AN ORGANIZED HEALTH CARE EDUCATION/TRAINING PROGRAM

## 2021-06-24 PROCEDURE — 99214 OFFICE O/P EST MOD 30 MIN: CPT | Performed by: STUDENT IN AN ORGANIZED HEALTH CARE EDUCATION/TRAINING PROGRAM

## 2021-06-24 PROCEDURE — G8417 CALC BMI ABV UP PARAM F/U: HCPCS | Performed by: STUDENT IN AN ORGANIZED HEALTH CARE EDUCATION/TRAINING PROGRAM

## 2021-06-24 PROCEDURE — 3017F COLORECTAL CA SCREEN DOC REV: CPT | Performed by: STUDENT IN AN ORGANIZED HEALTH CARE EDUCATION/TRAINING PROGRAM

## 2021-06-24 PROCEDURE — G8399 PT W/DXA RESULTS DOCUMENT: HCPCS | Performed by: STUDENT IN AN ORGANIZED HEALTH CARE EDUCATION/TRAINING PROGRAM

## 2021-06-24 PROCEDURE — 1036F TOBACCO NON-USER: CPT | Performed by: STUDENT IN AN ORGANIZED HEALTH CARE EDUCATION/TRAINING PROGRAM

## 2021-06-24 PROCEDURE — G8427 DOCREV CUR MEDS BY ELIG CLIN: HCPCS | Performed by: STUDENT IN AN ORGANIZED HEALTH CARE EDUCATION/TRAINING PROGRAM

## 2021-06-24 PROCEDURE — 1090F PRES/ABSN URINE INCON ASSESS: CPT | Performed by: STUDENT IN AN ORGANIZED HEALTH CARE EDUCATION/TRAINING PROGRAM

## 2021-06-24 PROCEDURE — 1123F ACP DISCUSS/DSCN MKR DOCD: CPT | Performed by: STUDENT IN AN ORGANIZED HEALTH CARE EDUCATION/TRAINING PROGRAM

## 2021-06-24 RX ORDER — HYDROCHLOROTHIAZIDE 25 MG/1
25 TABLET ORAL EVERY MORNING
Qty: 30 TABLET | Refills: 2 | Status: SHIPPED | OUTPATIENT
Start: 2021-06-24 | End: 2021-07-26 | Stop reason: SDUPTHER

## 2021-06-24 ASSESSMENT — ENCOUNTER SYMPTOMS
CHEST TIGHTNESS: 0
SHORTNESS OF BREATH: 0
CONSTIPATION: 0
NAUSEA: 0
SORE THROAT: 0
VOMITING: 0
DIARRHEA: 0
COUGH: 0
EYE DISCHARGE: 0
ABDOMINAL PAIN: 0
WHEEZING: 0

## 2021-06-24 NOTE — PROGRESS NOTES
hydroCHLOROthiazide (HYDRODIURIL) 25 MG tablet Take 1 tablet by mouth every morning 30 tablet 2    escitalopram (LEXAPRO) 20 MG tablet Take 1 tablet by mouth daily 30 tablet 3    aspirin EC 81 MG EC tablet Take 1 tablet by mouth daily 90 tablet 1    Cholecalciferol (VITAMIN D3) 50 MCG (2000 UT) TABS Take 1 tablet by mouth daily 30 tablet 2    Blood Pressure KIT Use to check BP twice a day 1 kit 0     No current facility-administered medications for this visit. No Known Allergies    Health Maintenance   Topic Date Due    Pneumococcal 65+ years Vaccine (1 of 1 - PPSV23) 07/09/2021 (Originally 5/25/2013)    COVID-19 Vaccine (1) 12/31/2021 (Originally 5/25/1962)    DTaP/Tdap/Td vaccine (1 - Tdap) 01/20/2022 (Originally 5/25/1967)    Flu vaccine (Season Ended) 01/20/2022 (Originally 9/1/2021)    Shingles Vaccine (1 of 2) 05/06/2022 (Originally 5/25/1998)    Potassium monitoring  02/25/2022    Creatinine monitoring  02/25/2022    TSH testing  05/20/2022    Annual Wellness Visit (AWV)  05/21/2022    Breast cancer screen  04/22/2023    Colon cancer screen fecal DNA test (Cologuard)  05/04/2024    Lipid screen  01/04/2026    DEXA (modify frequency per FRAX score)  Completed    Hepatitis C screen  Completed    Hepatitis A vaccine  Aged Out    Hepatitis B vaccine  Aged Out    Hib vaccine  Aged Out    Meningococcal (ACWY) vaccine  Aged Out       Subjective:     Review of Systems   Constitutional: Negative for appetite change, fatigue and fever. HENT: Negative for congestion, ear pain, hearing loss and sore throat. Eyes: Negative for discharge and visual disturbance. Respiratory: Negative for cough, chest tightness, shortness of breath and wheezing. Cardiovascular: Positive for leg swelling. Negative for chest pain and palpitations. Gastrointestinal: Negative for abdominal pain, constipation, diarrhea, nausea and vomiting.    Genitourinary: Negative for flank pain, frequency, hematuria and (121.1 kg)   SpO2 96%   BMI 48.83 kg/m²     Assessment/Plan:   1. Current mild episode of major depressive disorder without prior episode ([facility] [address]  2. Essential hypertension  -     hydroCHLOROthiazide (HYDRODIURIL) 25 MG tablet; Take 1 tablet by mouth every morning, Disp-30 tablet, R-2Normal  3. Hyperthyroidism    Depression-symptoms improving with Lexapro 20 mg daily. Declines to follow-up with psychotherapy at this time    Hypertension-well-controlled, on hydrochlorothiazide 25 mg daily. Hypothyroidism-TSH slightly improved to less than 0.02, following with endocrinology    Return in about 1 month (around 7/24/2021) for anxiety and depression, for 30 min. No orders of the defined types were placed in this encounter. Orders Placed This Encounter   Medications    hydroCHLOROthiazide (HYDRODIURIL) 25 MG tablet     Sig: Take 1 tablet by mouth every morning     Dispense:  30 tablet     Refill:  2       Patient given educational materials - see patient instructions. Discussed use, benefit, and side effects of prescribed medications. All patientquestions answered. Pt voiced understanding. Reviewed health maintenance. Instructedto continue current medications, diet and exercise. Patient agreed with treatmentplan. Follow up as directed.      Electronically signed by Marquez Barriga MD on 6/24/2021 at 5:09 PM

## 2021-07-12 ENCOUNTER — HOSPITAL ENCOUNTER (OUTPATIENT)
Age: 71
Setting detail: SPECIMEN
Discharge: HOME | End: 2021-07-12
Payer: MEDICARE

## 2021-07-12 LAB
THYROXINE, FREE: 0.8 NG/DL (ref 0.93–1.7)
TSH SERPL DL<=0.05 MIU/L-ACNC: 0.31 MIU/L (ref 0.3–5)

## 2021-07-13 LAB — T3 FREE: 2.35 PG/ML (ref 2.02–4.43)

## 2021-07-26 ENCOUNTER — OFFICE VISIT (OUTPATIENT)
Dept: FAMILY MEDICINE CLINIC | Age: 71
End: 2021-07-26
Payer: MEDICARE

## 2021-07-26 VITALS
TEMPERATURE: 97 F | HEART RATE: 79 BPM | WEIGHT: 273 LBS | BODY MASS INDEX: 49.93 KG/M2 | OXYGEN SATURATION: 97 % | SYSTOLIC BLOOD PRESSURE: 107 MMHG | DIASTOLIC BLOOD PRESSURE: 67 MMHG

## 2021-07-26 DIAGNOSIS — I10 ESSENTIAL HYPERTENSION: Primary | ICD-10-CM

## 2021-07-26 DIAGNOSIS — E55.9 VITAMIN D DEFICIENCY: ICD-10-CM

## 2021-07-26 DIAGNOSIS — Z23 NEED FOR VACCINATION FOR PNEUMOCOCCUS: ICD-10-CM

## 2021-07-26 DIAGNOSIS — F32.0 CURRENT MILD EPISODE OF MAJOR DEPRESSIVE DISORDER WITHOUT PRIOR EPISODE (HCC): ICD-10-CM

## 2021-07-26 PROCEDURE — G8399 PT W/DXA RESULTS DOCUMENT: HCPCS | Performed by: STUDENT IN AN ORGANIZED HEALTH CARE EDUCATION/TRAINING PROGRAM

## 2021-07-26 PROCEDURE — G0009 ADMIN PNEUMOCOCCAL VACCINE: HCPCS | Performed by: STUDENT IN AN ORGANIZED HEALTH CARE EDUCATION/TRAINING PROGRAM

## 2021-07-26 PROCEDURE — 90732 PPSV23 VACC 2 YRS+ SUBQ/IM: CPT | Performed by: STUDENT IN AN ORGANIZED HEALTH CARE EDUCATION/TRAINING PROGRAM

## 2021-07-26 PROCEDURE — 1090F PRES/ABSN URINE INCON ASSESS: CPT | Performed by: STUDENT IN AN ORGANIZED HEALTH CARE EDUCATION/TRAINING PROGRAM

## 2021-07-26 PROCEDURE — 4040F PNEUMOC VAC/ADMIN/RCVD: CPT | Performed by: STUDENT IN AN ORGANIZED HEALTH CARE EDUCATION/TRAINING PROGRAM

## 2021-07-26 PROCEDURE — 3017F COLORECTAL CA SCREEN DOC REV: CPT | Performed by: STUDENT IN AN ORGANIZED HEALTH CARE EDUCATION/TRAINING PROGRAM

## 2021-07-26 PROCEDURE — G8417 CALC BMI ABV UP PARAM F/U: HCPCS | Performed by: STUDENT IN AN ORGANIZED HEALTH CARE EDUCATION/TRAINING PROGRAM

## 2021-07-26 PROCEDURE — 99214 OFFICE O/P EST MOD 30 MIN: CPT | Performed by: STUDENT IN AN ORGANIZED HEALTH CARE EDUCATION/TRAINING PROGRAM

## 2021-07-26 PROCEDURE — 1123F ACP DISCUSS/DSCN MKR DOCD: CPT | Performed by: STUDENT IN AN ORGANIZED HEALTH CARE EDUCATION/TRAINING PROGRAM

## 2021-07-26 PROCEDURE — G8427 DOCREV CUR MEDS BY ELIG CLIN: HCPCS | Performed by: STUDENT IN AN ORGANIZED HEALTH CARE EDUCATION/TRAINING PROGRAM

## 2021-07-26 PROCEDURE — 1036F TOBACCO NON-USER: CPT | Performed by: STUDENT IN AN ORGANIZED HEALTH CARE EDUCATION/TRAINING PROGRAM

## 2021-07-26 RX ORDER — CHOLECALCIFEROL (VITAMIN D3) 125 MCG
1 CAPSULE ORAL DAILY
Qty: 30 TABLET | Refills: 5 | Status: SHIPPED | OUTPATIENT
Start: 2021-07-26 | End: 2022-02-16 | Stop reason: SDUPTHER

## 2021-07-26 RX ORDER — ESCITALOPRAM OXALATE 20 MG/1
20 TABLET ORAL DAILY
Qty: 30 TABLET | Refills: 3 | Status: SHIPPED | OUTPATIENT
Start: 2021-07-26 | End: 2021-10-28 | Stop reason: ALTCHOICE

## 2021-07-26 RX ORDER — LEVOTHYROXINE SODIUM 112 UG/1
TABLET ORAL
COMMUNITY
Start: 2021-07-16 | End: 2021-10-28 | Stop reason: ALTCHOICE

## 2021-07-26 RX ORDER — HYDROCHLOROTHIAZIDE 25 MG/1
25 TABLET ORAL EVERY MORNING
Qty: 30 TABLET | Refills: 2 | Status: SHIPPED | OUTPATIENT
Start: 2021-07-26 | End: 2022-02-04

## 2021-07-26 ASSESSMENT — ENCOUNTER SYMPTOMS
CONSTIPATION: 0
DIARRHEA: 0
WHEEZING: 0
CHEST TIGHTNESS: 0
EYE DISCHARGE: 0
COUGH: 0
SHORTNESS OF BREATH: 0
SORE THROAT: 0
ABDOMINAL PAIN: 0
NAUSEA: 0
VOMITING: 0

## 2021-07-26 NOTE — PROGRESS NOTES
[address] PRIMARY CARE  [address] [place] [address]  Dept: [phone]  Dept Fax: [phone]    Malcolm Bautista is a 66 y.o. female who is a Established patient, who presents today for her medical conditions/complaints as noted below:  Chief Complaint   Patient presents with    Follow-up     anxiety and depression         HPI:     She is here today to follow-up on anxiety and hypertension. Says that she is doing well on the current medications. She is following with endocrinologist for hyperthyroidism and had radioactive iodine treatment completed. She is now currently on levothyroxine 112 mcg daily and has a follow-up scheduled next month. Says that she has not felt any improvement with the medication and still feels the same way as before. She had her son's memorial service last week and is still coping with grief. She had to travel a few times to [place] and sitting in the car rides made her feet swell up a little bit more. Denies any shortness of breath or chest pains.       No results found for: LABA1C          ( goal A1Cis < 7)   No results found for: LABMICR  LDL Cholesterol (mg/dL)   Date Value   01/04/2021 68       (goal LDL is <100)   AST (U/L)   Date Value   01/28/2021 20     ALT (U/L)   Date Value   01/28/2021 16     BUN (mg/dL)   Date Value   02/25/2021 14     BP Readings from Last 3 Encounters:   07/26/21 107/67   06/24/21 120/76   06/09/21 130/82          (goal 120/80)    Past Medical History:   Diagnosis Date    Acute pulmonary embolism (HCC)     COPD (chronic obstructive pulmonary disease) (HCC)     Disease of blood and blood forming organ     Elevated blood pressure readings 1/4/2021    Hypertension       Past Surgical History:   Procedure Laterality Date    KNEE ARTHROSCOPY         Family History   Problem Relation Age of Onset    Cancer Mother     Kidney Disease Father        Social History     Tobacco Use    Smoking status: Never Smoker    Smokeless tobacco: Never Used   Substance Use Topics    Alcohol use: Never      Current Outpatient Medications   Medication Sig Dispense Refill    levothyroxine (SYNTHROID) 112 MCG tablet       Cholecalciferol (VITAMIN D3) 50 MCG (2000 UT) TABS Take 1 tablet by mouth daily 30 tablet 5    escitalopram (LEXAPRO) 20 MG tablet Take 1 tablet by mouth daily 30 tablet 3    hydroCHLOROthiazide (HYDRODIURIL) 25 MG tablet Take 1 tablet by mouth every morning 30 tablet 2    aspirin EC 81 MG EC tablet Take 1 tablet by mouth daily 90 tablet 1    Blood Pressure KIT Use to check BP twice a day 1 kit 0     No current facility-administered medications for this visit. No Known Allergies    Health Maintenance   Topic Date Due    Pneumococcal 65+ years Vaccine (1 of 1 - PPSV23) Never done    COVID-19 Vaccine (1) 12/31/2021 (Originally 5/25/1962)    DTaP/Tdap/Td vaccine (1 - Tdap) 01/20/2022 (Originally 5/25/1967)    Shingles Vaccine (1 of 2) 05/06/2022 (Originally 5/25/1998)    Flu vaccine (1) 09/01/2021    Potassium monitoring  02/25/2022    Creatinine monitoring  02/25/2022    Annual Wellness Visit (AWV)  05/21/2022    TSH testing  07/12/2022    Breast cancer screen  04/22/2023    Colon cancer screen fecal DNA test (Cologuard)  05/04/2024    Lipid screen  01/04/2026    DEXA (modify frequency per FRAX score)  Completed    Hepatitis C screen  Completed    Hepatitis A vaccine  Aged Out    Hepatitis B vaccine  Aged Out    Hib vaccine  Aged Out    Meningococcal (ACWY) vaccine  Aged Out       Subjective:     Review of Systems   Constitutional: Negative for appetite change, fatigue and fever. HENT: Negative for congestion, ear pain, hearing loss and sore throat. Eyes: Negative for discharge and visual disturbance. Respiratory: Negative for cough, chest tightness, shortness of breath and wheezing. Cardiovascular: Negative for chest pain, palpitations and leg swelling.    Gastrointestinal: Negative for abdominal pain, constipation, diarrhea, nausea and vomiting. Genitourinary: Negative for flank pain, frequency, hematuria and urgency. Musculoskeletal: Negative for arthralgias, gait problem, joint swelling and myalgias. Skin: Negative. Neurological: Negative for dizziness, weakness, numbness and headaches. Psychiatric/Behavioral: Negative. Negative for dysphoric mood. The patient is not nervous/anxious. Objective:     Physical Exam  Vitals reviewed. Constitutional:       Appearance: Normal appearance. She is normal weight. HENT:      Head: Normocephalic and atraumatic. Nose: Nose normal.      Mouth/Throat:      Mouth: Mucous membranes are moist.      Pharynx: Oropharynx is clear. Eyes:      Extraocular Movements: Extraocular movements intact. Conjunctiva/sclera: Conjunctivae normal.      Pupils: Pupils are equal, round, and reactive to light. Cardiovascular:      Rate and Rhythm: Normal rate and regular rhythm. Heart sounds: Normal heart sounds. No murmur heard. No gallop. Pulmonary:      Effort: Pulmonary effort is normal. No respiratory distress. Breath sounds: Normal breath sounds. No stridor. No wheezing. Abdominal:      General: Bowel sounds are normal. There is no distension. Palpations: Abdomen is soft. Tenderness: There is no abdominal tenderness. There is no guarding or rebound. Musculoskeletal:         General: No swelling or tenderness. Normal range of motion. Cervical back: Normal range of motion and neck supple. Skin:     General: Skin is warm and dry. Coloration: Skin is not jaundiced. Findings: No rash. Neurological:      General: No focal deficit present. Mental Status: She is alert and oriented to person, place, and time. Psychiatric:         Mood and Affect: Mood normal.         Behavior: Behavior normal.         Thought Content:  Thought content normal.         Judgment: Judgment normal. /67 (Site: Right Upper Arm, Position: Sitting, Cuff Size: Large Adult)   Pulse 79   Temp 97 °F (36.1 °C) (Temporal)   Wt 273 lb (123.8 kg)   SpO2 97%   BMI 49.93 kg/m²     Assessment/Plan:   1. Essential hypertension  -     hydroCHLOROthiazide (HYDRODIURIL) 25 MG tablet; Take 1 tablet by mouth every morning, Disp-30 tablet, R-2Normal  2. Current mild episode of major depressive disorder without prior episode (HCC)  -     escitalopram (LEXAPRO) 20 MG tablet; Take 1 tablet by mouth daily, Disp-30 tablet, R-3Normal  3. Vitamin D deficiency  -     Cholecalciferol (VITAMIN D3) 50 MCG (2000 UT) TABS; Take 1 tablet by mouth daily, Disp-30 tablet, R-5Normal  4. Need for vaccination for pneumococcus  -     PNEUMOVAX 23 subcutaneous/IM (Pneumococcal polysaccharide vaccine 23-valent >= 3yo)      Return in about 3 months (around 10/26/2021) for Follow up HTN. Orders Placed This Encounter   Procedures    PNEUMOVAX 23 subcutaneous/IM (Pneumococcal polysaccharide vaccine 23-valent >= 3yo)     Orders Placed This Encounter   Medications    Cholecalciferol (VITAMIN D3) 50 MCG (2000 UT) TABS     Sig: Take 1 tablet by mouth daily     Dispense:  30 tablet     Refill:  5    escitalopram (LEXAPRO) 20 MG tablet     Sig: Take 1 tablet by mouth daily     Dispense:  30 tablet     Refill:  3    hydroCHLOROthiazide (HYDRODIURIL) 25 MG tablet     Sig: Take 1 tablet by mouth every morning     Dispense:  30 tablet     Refill:  2       Patient given educational materials - see patient instructions. Discussed use, benefit, and side effects of prescribed medications. All patientquestions answered. Pt voiced understanding. Reviewed health maintenance. Instructedto continue current medications, diet and exercise. Patient agreed with treatmentplan. Follow up as directed.      Electronically signed by Meño Mckeon MD on 7/26/2021 at 3:16 PM

## 2021-07-26 NOTE — PROGRESS NOTES
Visit Information    Have you changed or started any medications since your last visit including any over-the-counter medicines, vitamins, or herbal medicines? no   Are you having any side effects from any of your medications? -  no  Have you stopped taking any of your medications? Is so, why? -  no    Have you seen any other physician or provider since your last visit? No  Have you had any other diagnostic tests since your last visit? No  Have you been seen in the emergency room and/or had an admission to a hospital since we last saw you? No  Have you had your routine dental cleaning in the past 6 months? no    Have you activated your [employer] account? If not, what are your barriers?  Yes     Patient Care Team:  Bart Darnell MD as PCP - General (Family Medicine)  Bart Darnell MD as PCP - [facility]    Medical History Review  Past Medical, Family, and Social History reviewed and does not contribute to the patient presenting condition    Health Maintenance   Topic Date Due    Pneumococcal 65+ years Vaccine (1 of 1 - PPSV23) Never done    COVID-19 Vaccine (1) 12/31/2021 (Originally 5/25/1962)    DTaP/Tdap/Td vaccine (1 - Tdap) 01/20/2022 (Originally 5/25/1969)    Shingles Vaccine (1 of 2) 05/06/2022 (Originally 5/25/1998)    Flu vaccine (1) 09/01/2021    Potassium monitoring  02/25/2022    Creatinine monitoring  02/25/2022    Annual Wellness Visit (AWV)  05/21/2022    TSH testing  07/12/2022    Breast cancer screen  04/22/2023    Colon cancer screen fecal DNA test (Cologuard)  05/04/2024    Lipid screen  01/04/2026    DEXA (modify frequency per FRAX score)  Completed    Hepatitis C screen  Completed    Hepatitis A vaccine  Aged Out    Hepatitis B vaccine  Aged Out    Hib vaccine  Aged Out    Meningococcal (ACWY) vaccine  Aged Out

## 2021-08-26 ENCOUNTER — HOSPITAL ENCOUNTER (OUTPATIENT)
Age: 71
Setting detail: SPECIMEN
Discharge: HOME | End: 2021-08-26
Payer: MEDICARE

## 2021-08-26 LAB
T3 FREE: 2.35 PG/ML (ref 2.02–4.43)
THYROXINE, FREE: 0.85 NG/DL (ref 0.93–1.7)
TSH SERPL DL<=0.05 MIU/L-ACNC: 1.86 MIU/L (ref 0.3–5)

## 2021-10-19 ENCOUNTER — HOSPITAL ENCOUNTER (OUTPATIENT)
Age: 71
Setting detail: SPECIMEN
Discharge: HOME | End: 2021-10-19
Payer: MEDICARE

## 2021-10-19 LAB
T3 FREE: 2.86 PG/ML (ref 2.02–4.43)
THYROXINE, FREE: 1.08 NG/DL (ref 0.93–1.7)
TSH SERPL DL<=0.05 MIU/L-ACNC: 2.48 MIU/L (ref 0.3–5)

## 2021-10-22 LAB
THYROGLOBULIN AB: 16 IU/ML (ref 0–40)
THYROID PEROXIDASE (TPO) AB: 20 IU/ML (ref 0–25)

## 2021-10-28 ENCOUNTER — OFFICE VISIT (OUTPATIENT)
Dept: FAMILY MEDICINE CLINIC | Age: 71
End: 2021-10-28
Payer: MEDICARE

## 2021-10-28 VITALS
HEART RATE: 90 BPM | BODY MASS INDEX: 52.63 KG/M2 | TEMPERATURE: 98 F | DIASTOLIC BLOOD PRESSURE: 86 MMHG | SYSTOLIC BLOOD PRESSURE: 138 MMHG | WEIGHT: 286 LBS | HEIGHT: 62 IN

## 2021-10-28 DIAGNOSIS — F32.0 CURRENT MILD EPISODE OF MAJOR DEPRESSIVE DISORDER WITHOUT PRIOR EPISODE (HCC): ICD-10-CM

## 2021-10-28 DIAGNOSIS — R92.8 ABNORMAL MAMMOGRAM OF LEFT BREAST: ICD-10-CM

## 2021-10-28 DIAGNOSIS — I10 ESSENTIAL HYPERTENSION: Primary | ICD-10-CM

## 2021-10-28 DIAGNOSIS — I89.0 LYMPHEDEMA OF BOTH LOWER EXTREMITIES: ICD-10-CM

## 2021-10-28 PROCEDURE — 3017F COLORECTAL CA SCREEN DOC REV: CPT | Performed by: STUDENT IN AN ORGANIZED HEALTH CARE EDUCATION/TRAINING PROGRAM

## 2021-10-28 PROCEDURE — G8427 DOCREV CUR MEDS BY ELIG CLIN: HCPCS | Performed by: STUDENT IN AN ORGANIZED HEALTH CARE EDUCATION/TRAINING PROGRAM

## 2021-10-28 PROCEDURE — G8417 CALC BMI ABV UP PARAM F/U: HCPCS | Performed by: STUDENT IN AN ORGANIZED HEALTH CARE EDUCATION/TRAINING PROGRAM

## 2021-10-28 PROCEDURE — 1036F TOBACCO NON-USER: CPT | Performed by: STUDENT IN AN ORGANIZED HEALTH CARE EDUCATION/TRAINING PROGRAM

## 2021-10-28 PROCEDURE — 4040F PNEUMOC VAC/ADMIN/RCVD: CPT | Performed by: STUDENT IN AN ORGANIZED HEALTH CARE EDUCATION/TRAINING PROGRAM

## 2021-10-28 PROCEDURE — 99214 OFFICE O/P EST MOD 30 MIN: CPT | Performed by: STUDENT IN AN ORGANIZED HEALTH CARE EDUCATION/TRAINING PROGRAM

## 2021-10-28 PROCEDURE — 1123F ACP DISCUSS/DSCN MKR DOCD: CPT | Performed by: STUDENT IN AN ORGANIZED HEALTH CARE EDUCATION/TRAINING PROGRAM

## 2021-10-28 PROCEDURE — G8399 PT W/DXA RESULTS DOCUMENT: HCPCS | Performed by: STUDENT IN AN ORGANIZED HEALTH CARE EDUCATION/TRAINING PROGRAM

## 2021-10-28 PROCEDURE — G8484 FLU IMMUNIZE NO ADMIN: HCPCS | Performed by: STUDENT IN AN ORGANIZED HEALTH CARE EDUCATION/TRAINING PROGRAM

## 2021-10-28 PROCEDURE — 1090F PRES/ABSN URINE INCON ASSESS: CPT | Performed by: STUDENT IN AN ORGANIZED HEALTH CARE EDUCATION/TRAINING PROGRAM

## 2021-10-28 RX ORDER — BUPROPION HYDROCHLORIDE 150 MG/1
150 TABLET ORAL EVERY MORNING
Qty: 30 TABLET | Refills: 3 | Status: SHIPPED | OUTPATIENT
Start: 2021-10-28 | End: 2021-12-06 | Stop reason: SDUPTHER

## 2021-10-28 ASSESSMENT — ENCOUNTER SYMPTOMS
SORE THROAT: 0
SHORTNESS OF BREATH: 0
CONSTIPATION: 0
EYE DISCHARGE: 0
DIARRHEA: 0
COUGH: 0
WHEEZING: 0
ABDOMINAL PAIN: 0
NAUSEA: 0
CHEST TIGHTNESS: 0
VOMITING: 0

## 2021-10-28 NOTE — PROGRESS NOTES
[address] PRIMARY CARE  [address] [place] [id number]  Dept: [phone]  Dept Fax: [phone]    Wayne Sanchez is a 66 y.o. female who is a Established patient, who presents today for her medical conditions/complaints as noted below:  Chief Complaint   Patient presents with    3 Month Follow-Up    Hypertension    Fatigue    Weight Gain         HPI:     She is here today to follow-up on hypertension and anxiety. She has been taking hydrochlorothiazide and her blood pressure controlled. She states she stopped taking the Lexapro long back saying that it did not help at all. She is also very concerned about her recent weight gain. She was taken off of levothyroxine when her last TSH were normal by her endocrinologist.  Her TSH has slowly been trending upwards still within the normal range. She is also frustrated with the swelling in her feet. She bought compression stockings but they did not work for her and she is still upset that she had to pay $50 for those. She is agreeable to following with lymphedema clinic.   She is also due for 6-month follow-up diagnostic mammogram of her left breast.      No results found for: LABA1C          ( goal A1Cis < 7)   No results found for: LABMICR  LDL Cholesterol (mg/dL)   Date Value   01/04/2021 68       (goal LDL is <100)   AST (U/L)   Date Value   01/28/2021 20     ALT (U/L)   Date Value   01/28/2021 16     BUN (mg/dL)   Date Value   02/25/2021 14     BP Readings from Last 3 Encounters:   10/28/21 138/86   07/26/21 107/67   06/24/21 120/76          (goal 120/80)    Past Medical History:   Diagnosis Date    Acute pulmonary embolism (HCC)     COPD (chronic obstructive pulmonary disease) (HCC)     Disease of blood and blood forming organ     Elevated blood pressure readings 1/4/2021    Hypertension       Past Surgical History:   Procedure Laterality Date    KNEE ARTHROSCOPY         Family History   Problem Relation Age of Onset    Cancer Mother     Kidney Disease Father        Social History     Tobacco Use    Smoking status: Never Smoker    Smokeless tobacco: Never Used   Substance Use Topics    Alcohol use: Never      Current Outpatient Medications   Medication Sig Dispense Refill    buPROPion (WELLBUTRIN XL) 150 MG extended release tablet Take 1 tablet by mouth every morning 30 tablet 3    LYMPHEDEMA PUMP 1 Device as needed (leg swelling) 1 each 0    Cholecalciferol (VITAMIN D3) 50 MCG (2000 UT) TABS Take 1 tablet by mouth daily 30 tablet 5    hydroCHLOROthiazide (HYDRODIURIL) 25 MG tablet Take 1 tablet by mouth every morning 30 tablet 2    aspirin EC 81 MG EC tablet Take 1 tablet by mouth daily 90 tablet 1    Blood Pressure KIT Use to check BP twice a day 1 kit 0     No current facility-administered medications for this visit. No Known Allergies    Health Maintenance   Topic Date Due    COVID-19 Vaccine (1) 12/31/2021 (Originally 5/25/1962)    DTaP/Tdap/Td vaccine (1 - Tdap) 01/20/2022 (Originally [birth year]/25/1967)    Shingles Vaccine (1 of 2) 05/06/2022 (Originally 5/25/1998)    Flu vaccine (1) 10/28/2022 (Originally 9/1/2021)    Potassium monitoring  02/25/2022    Creatinine monitoring  02/25/2022    Annual Wellness Visit (AWV)  05/21/2022    TSH testing  10/19/2022    Breast cancer screen  04/22/2023    Colon cancer screen fecal DNA test (Cologuard)  05/04/2024    Lipid screen  01/04/2026    DEXA (modify frequency per FRAX score)  Completed    Pneumococcal 65+ years Vaccine  Completed    Hepatitis C screen  Completed    Hepatitis A vaccine  Aged Out    Hepatitis B vaccine  Aged Out    Hib vaccine  Aged Out    Meningococcal (ACWY) vaccine  Aged Out       Subjective:     Review of Systems   Constitutional: Positive for fatigue and unexpected weight change. Negative for appetite change and fever. HENT: Negative for congestion, ear pain, hearing loss and sore throat.     Eyes: Negative for discharge and visual disturbance. Respiratory: Negative for cough, chest tightness, shortness of breath and wheezing. Cardiovascular: Positive for leg swelling. Negative for chest pain and palpitations. Gastrointestinal: Negative for abdominal pain, constipation, diarrhea, nausea and vomiting. Genitourinary: Negative for flank pain, frequency, hematuria and urgency. Musculoskeletal: Negative for arthralgias, gait problem, joint swelling and myalgias. Skin: Negative. Neurological: Negative for dizziness, weakness, numbness and headaches. Psychiatric/Behavioral: Positive for dysphoric mood. The patient is nervous/anxious. Objective:     Physical Exam  Vitals reviewed. Constitutional:       Appearance: Normal appearance. She is normal weight. HENT:      Head: Normocephalic and atraumatic. Nose: Nose normal.      Mouth/Throat:      Mouth: Mucous membranes are moist.      Pharynx: Oropharynx is clear. Eyes:      Extraocular Movements: Extraocular movements intact. Conjunctiva/sclera: Conjunctivae normal.      Pupils: Pupils are equal, round, and reactive to light. Cardiovascular:      Rate and Rhythm: Normal rate and regular rhythm. Heart sounds: Normal heart sounds. No murmur heard. No gallop. Pulmonary:      Effort: Pulmonary effort is normal. No respiratory distress. Breath sounds: Normal breath sounds. No stridor. No wheezing. Abdominal:      General: Bowel sounds are normal. There is no distension. Palpations: Abdomen is soft. Tenderness: There is no abdominal tenderness. There is no guarding or rebound. Musculoskeletal:         General: No swelling or tenderness. Normal range of motion. Cervical back: Normal range of motion and neck supple. Right lower leg: Edema present. Left lower leg: Edema present. Skin:     General: Skin is warm and dry. Coloration: Skin is not jaundiced. Findings: No rash.    Neurological: General: No focal deficit present. Mental Status: She is alert and oriented to person, place, and time. Psychiatric:         Mood and Affect: Mood normal.         Behavior: Behavior normal.         Thought Content: Thought content normal.         Judgment: Judgment normal.       /86 (Site: Left Wrist, Position: Sitting, Cuff Size: Child)   Pulse 90   Temp 98 °F (36.7 °C) (Temporal)   Ht 5' 2\" (1.575 m)   Wt 286 lb (129.7 kg)   BMI 52.31 kg/m²     Assessment/Plan:   1. Essential hypertension  2. Current mild episode of major depressive disorder without prior episode (HCC)  -     buPROPion (WELLBUTRIN XL) 150 MG extended release tablet; Take 1 tablet by mouth every morning, Disp-30 tablet, R-3Normal  3. Lymphedema of both lower extremities  -     LYMPHEDEMA PUMP; PRN Starting Thu 10/28/2021, Disp-1 each, R-0, Print  4. Abnormal mammogram of left breast  -     CHRIS DIGITAL DIAGNOSTIC W OR WO CAD BILATERAL; Future    Hypertension-controlled. Continue hydrochlorothiazide 25 mg daily    Anxiety and depression-Lexapro did not work. Started on Wellbutrin 150 mg daily. Following with group psychotherapy for grief counseling. Bilateral lymphedema-does not want to compression stockings. Declines to go to lymphedema clinic because of it being far away from her home. Return in about 2 months (around 2021) for anxiety and depression.     Orders Placed This Encounter   Procedures    CHRIS DIGITAL DIAGNOSTIC W OR WO CAD BILATERAL     Standing Status:   Future     Standing Expiration Date:   2022     Order Specific Question:   Reason for exam:     Answer:   follow up     Orders Placed This Encounter   Medications    buPROPion (WELLBUTRIN XL) 150 MG extended release tablet     Sig: Take 1 tablet by mouth every morning     Dispense:  30 tablet     Refill:  3    LYMPHEDEMA PUMP     Si Device as needed (leg swelling)     Dispense:  1 each     Refill:  0       Patient given educational materials - see patient instructions. Discussed use, benefit, and side effects of prescribed medications. All patientquestions answered. Pt voiced understanding. Reviewed health maintenance. Instructedto continue current medications, diet and exercise. Patient agreed with treatmentplan. Follow up as directed.      Electronically signed by Sonia Chun MD on 10/28/2021 at 3:48 PM

## 2021-11-03 ENCOUNTER — HOSPITAL ENCOUNTER (OUTPATIENT)
Dept: MAMMOGRAPHY | Age: 71
Discharge: HOME | End: 2021-11-05
Payer: MEDICARE

## 2021-11-03 DIAGNOSIS — R92.8 ABNORMAL MAMMOGRAM OF LEFT BREAST: ICD-10-CM

## 2021-11-03 PROCEDURE — 77065 DX MAMMO INCL CAD UNI: CPT

## 2021-11-16 ENCOUNTER — HOSPITAL ENCOUNTER (OUTPATIENT)
Age: 71
Setting detail: SPECIMEN
Discharge: HOME | End: 2021-11-16

## 2021-11-16 LAB
T3 FREE: 2.75 PG/ML (ref 2.02–4.43)
THYROXINE, FREE: 1.71 NG/DL (ref 0.93–1.7)
TSH SERPL DL<=0.05 MIU/L-ACNC: 0.26 MIU/L (ref 0.3–5)

## 2021-12-02 DIAGNOSIS — I10 ESSENTIAL HYPERTENSION: ICD-10-CM

## 2021-12-02 RX ORDER — ASPIRIN 81 MG/1
TABLET ORAL
Qty: 90 TABLET | Refills: 1 | Status: SHIPPED | OUTPATIENT
Start: 2021-12-02 | End: 2021-12-29 | Stop reason: SDUPTHER

## 2021-12-02 NOTE — TELEPHONE ENCOUNTER
Efrain Salazar is calling to request a refill on the following medication(s):    Medication Request:  Requested Prescriptions     Pending Prescriptions Disp Refills    ASPIRIN ADULT LOW STRENGTH 81 MG EC tablet [Pharmacy Med Name: ASPIRIN EC 81 MG TABLET] 90 tablet 1     Sig: TAKE ONE TABLET BY MOUTH DAILY       Last Visit Date (If Applicable):  00/50/5983    Next Visit Date:    12/6/2021

## 2021-12-06 ENCOUNTER — OFFICE VISIT (OUTPATIENT)
Dept: FAMILY MEDICINE CLINIC | Age: 71
End: 2021-12-06
Payer: MEDICARE

## 2021-12-06 VITALS
SYSTOLIC BLOOD PRESSURE: 140 MMHG | HEIGHT: 62 IN | HEART RATE: 91 BPM | DIASTOLIC BLOOD PRESSURE: 82 MMHG | TEMPERATURE: 97.2 F | OXYGEN SATURATION: 98 % | WEIGHT: 282 LBS | BODY MASS INDEX: 51.89 KG/M2

## 2021-12-06 DIAGNOSIS — F32.0 CURRENT MILD EPISODE OF MAJOR DEPRESSIVE DISORDER WITHOUT PRIOR EPISODE (HCC): ICD-10-CM

## 2021-12-06 DIAGNOSIS — I10 ESSENTIAL HYPERTENSION: Primary | ICD-10-CM

## 2021-12-06 DIAGNOSIS — I89.0 LYMPHEDEMA OF BOTH LOWER EXTREMITIES: ICD-10-CM

## 2021-12-06 PROCEDURE — 1036F TOBACCO NON-USER: CPT | Performed by: STUDENT IN AN ORGANIZED HEALTH CARE EDUCATION/TRAINING PROGRAM

## 2021-12-06 PROCEDURE — G8484 FLU IMMUNIZE NO ADMIN: HCPCS | Performed by: STUDENT IN AN ORGANIZED HEALTH CARE EDUCATION/TRAINING PROGRAM

## 2021-12-06 PROCEDURE — 1123F ACP DISCUSS/DSCN MKR DOCD: CPT | Performed by: STUDENT IN AN ORGANIZED HEALTH CARE EDUCATION/TRAINING PROGRAM

## 2021-12-06 PROCEDURE — 99214 OFFICE O/P EST MOD 30 MIN: CPT | Performed by: STUDENT IN AN ORGANIZED HEALTH CARE EDUCATION/TRAINING PROGRAM

## 2021-12-06 PROCEDURE — G8417 CALC BMI ABV UP PARAM F/U: HCPCS | Performed by: STUDENT IN AN ORGANIZED HEALTH CARE EDUCATION/TRAINING PROGRAM

## 2021-12-06 PROCEDURE — G8427 DOCREV CUR MEDS BY ELIG CLIN: HCPCS | Performed by: STUDENT IN AN ORGANIZED HEALTH CARE EDUCATION/TRAINING PROGRAM

## 2021-12-06 PROCEDURE — 1090F PRES/ABSN URINE INCON ASSESS: CPT | Performed by: STUDENT IN AN ORGANIZED HEALTH CARE EDUCATION/TRAINING PROGRAM

## 2021-12-06 PROCEDURE — 4040F PNEUMOC VAC/ADMIN/RCVD: CPT | Performed by: STUDENT IN AN ORGANIZED HEALTH CARE EDUCATION/TRAINING PROGRAM

## 2021-12-06 PROCEDURE — G8399 PT W/DXA RESULTS DOCUMENT: HCPCS | Performed by: STUDENT IN AN ORGANIZED HEALTH CARE EDUCATION/TRAINING PROGRAM

## 2021-12-06 PROCEDURE — 3017F COLORECTAL CA SCREEN DOC REV: CPT | Performed by: STUDENT IN AN ORGANIZED HEALTH CARE EDUCATION/TRAINING PROGRAM

## 2021-12-06 RX ORDER — LEVOTHYROXINE SODIUM 112 UG/1
TABLET ORAL
COMMUNITY
Start: 2021-11-23 | End: 2022-06-07

## 2021-12-06 RX ORDER — BUPROPION HYDROCHLORIDE 150 MG/1
150 TABLET ORAL EVERY MORNING
Qty: 90 TABLET | Refills: 1 | Status: SHIPPED | OUTPATIENT
Start: 2021-12-06 | End: 2022-08-15

## 2021-12-06 ASSESSMENT — ENCOUNTER SYMPTOMS
EYE DISCHARGE: 0
SORE THROAT: 0
CONSTIPATION: 0
WHEEZING: 0
COUGH: 0
ABDOMINAL PAIN: 0
NAUSEA: 0
CHEST TIGHTNESS: 0
SHORTNESS OF BREATH: 0
DIARRHEA: 0
VOMITING: 0

## 2021-12-20 ENCOUNTER — HOSPITAL ENCOUNTER (OUTPATIENT)
Age: 71
Setting detail: SPECIMEN
Discharge: HOME | End: 2021-12-20

## 2021-12-20 LAB
T3 FREE: 2.38 PG/ML (ref 2.02–4.43)
THYROXINE, FREE: 1.01 NG/DL (ref 0.93–1.7)
TSH SERPL DL<=0.05 MIU/L-ACNC: 3.92 MIU/L (ref 0.3–5)

## 2021-12-29 DIAGNOSIS — I10 ESSENTIAL HYPERTENSION: ICD-10-CM

## 2021-12-29 RX ORDER — ASPIRIN 81 MG/1
81 TABLET ORAL DAILY
Qty: 90 TABLET | Refills: 1 | Status: SHIPPED | OUTPATIENT
Start: 2021-12-29 | End: 2022-07-05 | Stop reason: SDUPTHER

## 2021-12-29 NOTE — TELEPHONE ENCOUNTER
Requested Prescriptions     Pending Prescriptions Disp Refills    aspirin (ASPIRIN ADULT LOW STRENGTH) 81 MG EC tablet 90 tablet 1     Sig: Take 1 tablet by mouth daily

## 2022-02-04 DIAGNOSIS — I10 ESSENTIAL HYPERTENSION: ICD-10-CM

## 2022-02-04 RX ORDER — HYDROCHLOROTHIAZIDE 25 MG/1
TABLET ORAL
Qty: 30 TABLET | Refills: 2 | Status: SHIPPED | OUTPATIENT
Start: 2022-02-04 | End: 2022-05-06

## 2022-02-04 NOTE — TELEPHONE ENCOUNTER
Pramod Tavares is calling to request a refill on the following medication(s):    Medication Request:  Requested Prescriptions     Pending Prescriptions Disp Refills    hydroCHLOROthiazide (HYDRODIURIL) 25 MG tablet [Pharmacy Med Name: hydroCHLOROthiazide 25 MG TABLET] 30 tablet 2     Sig: TAKE ONE TABLET BY MOUTH EVERY MORNING       Last Visit Date (If Applicable):  29/1/2248    Next Visit Date:    3/7/2022

## 2022-02-15 ENCOUNTER — PATIENT MESSAGE (OUTPATIENT)
Dept: FAMILY MEDICINE CLINIC | Age: 72
End: 2022-02-15

## 2022-02-15 DIAGNOSIS — E55.9 VITAMIN D DEFICIENCY: ICD-10-CM

## 2022-02-16 RX ORDER — CHOLECALCIFEROL (VITAMIN D3) 125 MCG
1 CAPSULE ORAL DAILY
Qty: 30 TABLET | Refills: 5 | Status: SHIPPED | OUTPATIENT
Start: 2022-02-16 | End: 2022-08-15

## 2022-02-16 NOTE — TELEPHONE ENCOUNTER
From: Kaye Wilburn  To: Dr. Katie Mueller: 2/15/2022 6:03 PM EST  Subject: Refill on meds    I need a refill on my Vitamin D3. 50 MCG TABLETS

## 2022-02-16 NOTE — TELEPHONE ENCOUNTER
Anabel Hill is calling to request a refill on the following medication(s):    Medication Request:  Requested Prescriptions     Pending Prescriptions Disp Refills    Cholecalciferol (VITAMIN D3) 50 MCG (2000 UT) TABS 30 tablet 5     Sig: Take 1 tablet by mouth daily       Last Visit Date (If Applicable):  66/2/9624    Next Visit Date:    3/7/2022

## 2022-03-07 ENCOUNTER — OFFICE VISIT (OUTPATIENT)
Dept: FAMILY MEDICINE CLINIC | Age: 72
End: 2022-03-07
Payer: MEDICARE

## 2022-03-07 VITALS
DIASTOLIC BLOOD PRESSURE: 80 MMHG | OXYGEN SATURATION: 96 % | TEMPERATURE: 97 F | SYSTOLIC BLOOD PRESSURE: 124 MMHG | WEIGHT: 286 LBS | BODY MASS INDEX: 52.63 KG/M2 | HEART RATE: 86 BPM | HEIGHT: 62 IN

## 2022-03-07 DIAGNOSIS — I26.99 BILATERAL PULMONARY EMBOLISM (HCC): ICD-10-CM

## 2022-03-07 DIAGNOSIS — F32.0 CURRENT MILD EPISODE OF MAJOR DEPRESSIVE DISORDER WITHOUT PRIOR EPISODE (HCC): ICD-10-CM

## 2022-03-07 DIAGNOSIS — R73.9 HYPERGLYCEMIA: ICD-10-CM

## 2022-03-07 DIAGNOSIS — I10 ESSENTIAL HYPERTENSION: Primary | ICD-10-CM

## 2022-03-07 DIAGNOSIS — E55.9 VITAMIN D DEFICIENCY: ICD-10-CM

## 2022-03-07 DIAGNOSIS — E05.90 HYPERTHYROIDISM: ICD-10-CM

## 2022-03-07 PROBLEM — I27.20 MILD PULMONARY HYPERTENSION (HCC): Status: RESOLVED | Noted: 2021-01-04 | Resolved: 2022-03-07

## 2022-03-07 PROBLEM — D68.59 HYPERCOAGULABLE STATE (HCC): Status: ACTIVE | Noted: 2022-03-07

## 2022-03-07 PROBLEM — D68.59 HYPERCOAGULABLE STATE (HCC): Status: RESOLVED | Noted: 2022-03-07 | Resolved: 2022-03-07

## 2022-03-07 PROCEDURE — 1123F ACP DISCUSS/DSCN MKR DOCD: CPT | Performed by: STUDENT IN AN ORGANIZED HEALTH CARE EDUCATION/TRAINING PROGRAM

## 2022-03-07 PROCEDURE — G8427 DOCREV CUR MEDS BY ELIG CLIN: HCPCS | Performed by: STUDENT IN AN ORGANIZED HEALTH CARE EDUCATION/TRAINING PROGRAM

## 2022-03-07 PROCEDURE — 3017F COLORECTAL CA SCREEN DOC REV: CPT | Performed by: STUDENT IN AN ORGANIZED HEALTH CARE EDUCATION/TRAINING PROGRAM

## 2022-03-07 PROCEDURE — 1036F TOBACCO NON-USER: CPT | Performed by: STUDENT IN AN ORGANIZED HEALTH CARE EDUCATION/TRAINING PROGRAM

## 2022-03-07 PROCEDURE — 1090F PRES/ABSN URINE INCON ASSESS: CPT | Performed by: STUDENT IN AN ORGANIZED HEALTH CARE EDUCATION/TRAINING PROGRAM

## 2022-03-07 PROCEDURE — G8399 PT W/DXA RESULTS DOCUMENT: HCPCS | Performed by: STUDENT IN AN ORGANIZED HEALTH CARE EDUCATION/TRAINING PROGRAM

## 2022-03-07 PROCEDURE — G8484 FLU IMMUNIZE NO ADMIN: HCPCS | Performed by: STUDENT IN AN ORGANIZED HEALTH CARE EDUCATION/TRAINING PROGRAM

## 2022-03-07 PROCEDURE — 4040F PNEUMOC VAC/ADMIN/RCVD: CPT | Performed by: STUDENT IN AN ORGANIZED HEALTH CARE EDUCATION/TRAINING PROGRAM

## 2022-03-07 PROCEDURE — 99214 OFFICE O/P EST MOD 30 MIN: CPT | Performed by: STUDENT IN AN ORGANIZED HEALTH CARE EDUCATION/TRAINING PROGRAM

## 2022-03-07 PROCEDURE — G8417 CALC BMI ABV UP PARAM F/U: HCPCS | Performed by: STUDENT IN AN ORGANIZED HEALTH CARE EDUCATION/TRAINING PROGRAM

## 2022-03-07 ASSESSMENT — ENCOUNTER SYMPTOMS
WHEEZING: 0
ABDOMINAL PAIN: 0
NAUSEA: 0
CHEST TIGHTNESS: 0
CONSTIPATION: 0
SHORTNESS OF BREATH: 0
DIARRHEA: 0
SORE THROAT: 0
EYE DISCHARGE: 0
COUGH: 0
VOMITING: 0

## 2022-03-07 NOTE — PROGRESS NOTES
[address] PRIMARY CARE  [address] [place] [address]  Dept: [phone]  Dept Fax: [phone]    Lolita Marcos is a 66 y.o. female who is a Established patient, who presents today for her medical conditions/complaints as noted below:  Chief Complaint   Patient presents with    Anxiety    Depression    Hypertension         HPI:     She is here today to follow-up on hypertension and depression. She says that she has been taking all her medications except for levothyroxine. She says that she was really upset with the previous endocrinology office and would like to establish with a different office. She has not noticed any change in her symptoms since she stopped the levothyroxine about 3 months ago. She says that the Wellbutrin has been helping with her mood and she does not feel sad or low anymore. She is concerned about her persistent weight gain. She does admit that she does not leave home much and is usually sitting around. She plans to start being more active and walk outside once the weather improves.       No results found for: LABA1C          ( goal A1Cis < 7)   No results found for: LABMICR  LDL Cholesterol (mg/dL)   Date Value   01/04/2021 68       (goal LDL is <100)   AST (U/L)   Date Value   01/28/2021 20     ALT (U/L)   Date Value   01/28/2021 16     BUN (mg/dL)   Date Value   02/25/2021 14     BP Readings from Last 3 Encounters:   03/07/22 124/80   12/06/21 (!) 140/82   10/28/21 138/86          (goal 120/80)    Past Medical History:   Diagnosis Date    Acute pulmonary embolism (HCC)     COPD (chronic obstructive pulmonary disease) (HCC)     Disease of blood and blood forming organ     Elevated blood pressure readings 1/4/2021    Hypertension       Past Surgical History:   Procedure Laterality Date    KNEE ARTHROSCOPY         Family History   Problem Relation Age of Onset    Cancer Mother     Kidney Disease Father        Social History Tobacco Use    Smoking status: Never Smoker    Smokeless tobacco: Never Used   Substance Use Topics    Alcohol use: Never      Current Outpatient Medications   Medication Sig Dispense Refill    Cholecalciferol (VITAMIN D3) 50 MCG (2000 UT) TABS Take 1 tablet by mouth daily 30 tablet 5    hydroCHLOROthiazide (HYDRODIURIL) 25 MG tablet TAKE ONE TABLET BY MOUTH EVERY MORNING 30 tablet 2    aspirin (ASPIRIN ADULT LOW STRENGTH) 81 MG EC tablet Take 1 tablet by mouth daily 90 tablet 1    buPROPion (WELLBUTRIN XL) 150 MG extended release tablet Take 1 tablet by mouth every morning 90 tablet 1    Blood Pressure KIT Use to check BP twice a day 1 kit 0    levothyroxine (SYNTHROID) 112 MCG tablet  (Patient not taking: Reported on 3/7/2022)      LYMPHEDEMA PUMP 1 Device as needed (leg swelling) (Patient not taking: Reported on 3/7/2022) 1 each 0     No current facility-administered medications for this visit. No Known Allergies    Health Maintenance   Topic Date Due    COVID-19 Vaccine (1) Never done    DTaP/Tdap/Td vaccine (1 - Tdap) Never done    Potassium monitoring  02/25/2022    Creatinine monitoring  02/25/2022    Shingles Vaccine (1 of 2) 05/06/2022 (Originally 5/25/1998)    Flu vaccine (1) 10/28/2022 (Originally 9/1/2021)    Depression Monitoring  05/20/2022    Annual Wellness Visit (AWV)  05/21/2022    TSH testing  12/20/2022    Breast cancer screen  11/03/2023    Colorectal Cancer Screen  05/04/2024    Lipid screen  01/04/2026    DEXA (modify frequency per FRAX score)  Completed    Pneumococcal 65+ years Vaccine  Completed    Hepatitis C screen  Completed    Hepatitis A vaccine  Aged Out    Hepatitis B vaccine  Aged Out    Hib vaccine  Aged Out    Meningococcal (ACWY) vaccine  Aged Out       Subjective:     Review of Systems   Constitutional: Negative for appetite change, fatigue and fever. HENT: Negative for congestion, ear pain, hearing loss and sore throat.     Eyes: Negative for discharge and visual disturbance. Respiratory: Negative for cough, chest tightness, shortness of breath and wheezing. Cardiovascular: Negative for chest pain, palpitations and leg swelling. Gastrointestinal: Negative for abdominal pain, constipation, diarrhea, nausea and vomiting. Genitourinary: Negative for flank pain, frequency, hematuria and urgency. Musculoskeletal: Negative for arthralgias, gait problem, joint swelling and myalgias. Skin: Negative. Neurological: Negative for dizziness, weakness, numbness and headaches. Psychiatric/Behavioral: Negative for dysphoric mood. The patient is nervous/anxious. Objective:     Physical Exam  Vitals reviewed. Constitutional:       Appearance: Normal appearance. She is normal weight. HENT:      Head: Normocephalic and atraumatic. Nose: Nose normal.      Mouth/Throat:      Mouth: Mucous membranes are moist.      Pharynx: Oropharynx is clear. Eyes:      Extraocular Movements: Extraocular movements intact. Conjunctiva/sclera: Conjunctivae normal.      Pupils: Pupils are equal, round, and reactive to light. Cardiovascular:      Rate and Rhythm: Normal rate and regular rhythm. Heart sounds: Normal heart sounds. No murmur heard. No gallop. Pulmonary:      Effort: Pulmonary effort is normal. No respiratory distress. Breath sounds: Normal breath sounds. No stridor. No wheezing. Abdominal:      General: Bowel sounds are normal. There is no distension. Palpations: Abdomen is soft. Tenderness: There is no abdominal tenderness. There is no guarding or rebound. Musculoskeletal:         General: No swelling or tenderness. Normal range of motion. Cervical back: Normal range of motion and neck supple. Skin:     General: Skin is warm and dry. Coloration: Skin is not jaundiced. Findings: No rash. Neurological:      General: No focal deficit present.       Mental Status: She is alert and oriented to person, place, and time. Psychiatric:         Mood and Affect: Mood normal.         Behavior: Behavior normal.         Thought Content: Thought content normal.         Judgment: Judgment normal.       /80   Pulse 86   Temp 97 °F (36.1 °C)   Ht 5' 2\" (1.575 m)   Wt 286 lb (129.7 kg)   SpO2 96%   BMI 52.31 kg/m²     Assessment/Plan:   1. Essential hypertension  -     CBC with Auto Differential; Future  -     Comprehensive Metabolic Panel, Fasting; Future  -     Lipid, Fasting; Future  2. Current mild episode of major depressive disorder without prior episode ([facility] [address]  3. Hyperthyroidism  -     TSH with Reflex; Future  -     T4, Free; Future  -     External Referral To Endocrinology  4. Bilateral pulmonary embolism ([facility] [address]  5. Vitamin D deficiency  -     Vitamin D 25 Hydroxy; Future  6. Hyperglycemia  -     Hemoglobin A1C; Future    Hypertension-controlled, continue hydrochlorothiazide 25 mg daily    Anxiety and depression-improving on Wellbutrin 150 mg daily    Post ablative hypothyroidism-had history of hypothyroidism and underwent ablation. No longer taking levothyroxine. Labs ordered and referral placed for endocrinology. Bilateral pulmonary embolism-no longer on Eliquis, was considered provoked due to long car ride. Return in about 3 months (around 6/7/2022) for Follow up HTN.     Orders Placed This Encounter   Procedures    CBC with Auto Differential     Standing Status:   Future     Standing Expiration Date:   9/7/2022    Comprehensive Metabolic Panel, Fasting     Standing Status:   Future     Standing Expiration Date:   9/7/2022    Hemoglobin A1C     Standing Status:   Future     Standing Expiration Date:   9/7/2022    Lipid, Fasting     Standing Status:   Future     Standing Expiration Date:   9/7/2022    TSH with Reflex     Standing Status:   Future     Standing Expiration Date:   9/7/2022    Vitamin D 25 Hydroxy     Standing Status:   Future     Standing Expiration Date:   9/7/2022  T4, Free     Standing Status:   Future     Standing Expiration Date:   3/7/2023    External Referral To Endocrinology     Referral Priority:   Routine     Referral Type:   Eval and Treat     Referral Reason:   Specialty Services Required     [facility] Location[facility]   [facility]     Requested Specialty:   Endocrinology     Number of Visits Requested:   1     No orders of the defined types were placed in this encounter. Patient given educational materials - see patient instructions. Discussed use, benefit, and side effects of prescribed medications. All patientquestions answered. Pt voiced understanding. Reviewed health maintenance. Instructedto continue current medications, diet and exercise. Patient agreed with treatmentplan. Follow up as directed.      Electronically signed by Hallie Arndt MD on 3/7/2022 at 5:57 PM

## 2022-03-09 ENCOUNTER — HOSPITAL ENCOUNTER (OUTPATIENT)
Age: 72
Setting detail: SPECIMEN
Discharge: HOME | End: 2022-03-09

## 2022-03-09 DIAGNOSIS — E05.90 HYPERTHYROIDISM: ICD-10-CM

## 2022-03-09 DIAGNOSIS — E55.9 VITAMIN D DEFICIENCY: ICD-10-CM

## 2022-03-09 DIAGNOSIS — R73.9 HYPERGLYCEMIA: ICD-10-CM

## 2022-03-09 DIAGNOSIS — I10 ESSENTIAL HYPERTENSION: ICD-10-CM

## 2022-03-09 LAB
ABSOLUTE EOS #: 0.12 K/UL (ref 0–0.44)
ABSOLUTE IMMATURE GRANULOCYTE: <0.03 K/UL (ref 0–0.3)
ABSOLUTE LYMPH #: 1.28 K/UL (ref 1.1–3.7)
ABSOLUTE MONO #: 0.72 K/UL (ref 0.1–1.2)
ALBUMIN SERPL-MCNC: 4 G/DL (ref 3.5–5.2)
ALBUMIN/GLOBULIN RATIO: 1.5 (ref 1–2.5)
ALP BLD-CCNC: 86 U/L (ref 35–104)
ALT SERPL-CCNC: 11 U/L (ref 5–33)
ANION GAP SERPL CALCULATED.3IONS-SCNC: 16 MMOL/L (ref 9–17)
AST SERPL-CCNC: 16 U/L
BASOPHILS # BLD: 1 % (ref 0–2)
BASOPHILS ABSOLUTE: 0.06 K/UL (ref 0–0.2)
BILIRUB SERPL-MCNC: 0.26 MG/DL (ref 0.3–1.2)
BUN BLDV-MCNC: 15 MG/DL (ref 8–23)
CALCIUM SERPL-MCNC: 9.4 MG/DL (ref 8.6–10.4)
CHLORIDE BLD-SCNC: 100 MMOL/L (ref 98–107)
CHOLESTEROL, FASTING: 222 MG/DL
CHOLESTEROL/HDL RATIO: 3.7
CO2: 23 MMOL/L (ref 20–31)
CREAT SERPL-MCNC: 0.95 MG/DL (ref 0.5–0.9)
EOSINOPHILS RELATIVE PERCENT: 2 % (ref 1–4)
ESTIMATED AVERAGE GLUCOSE: 134 MG/DL
GFR AFRICAN AMERICAN: >60 ML/MIN
GFR NON-AFRICAN AMERICAN: 58 ML/MIN
GFR SERPL CREATININE-BSD FRML MDRD: ABNORMAL ML/MIN/{1.73_M2}
GLUCOSE FASTING: 97 MG/DL (ref 70–99)
HBA1C MFR BLD: 6.3 % (ref 4–6)
HCT VFR BLD CALC: 36.3 % (ref 36.3–47.1)
HDLC SERPL-MCNC: 60 MG/DL
HEMOGLOBIN: 11.8 G/DL (ref 11.9–15.1)
IMMATURE GRANULOCYTES: 0 %
LDL CHOLESTEROL: 128 MG/DL (ref 0–130)
LYMPHOCYTES # BLD: 21 % (ref 24–43)
MCH RBC QN AUTO: 30.9 PG (ref 25.2–33.5)
MCHC RBC AUTO-ENTMCNC: 32.5 G/DL (ref 28.4–34.8)
MCV RBC AUTO: 95 FL (ref 82.6–102.9)
MONOCYTES # BLD: 12 % (ref 3–12)
NRBC AUTOMATED: 0 PER 100 WBC
PDW BLD-RTO: 12.9 % (ref 11.8–14.4)
PLATELET # BLD: 247 K/UL (ref 138–453)
PMV BLD AUTO: 11 FL (ref 8.1–13.5)
POTASSIUM SERPL-SCNC: 4.4 MMOL/L (ref 3.7–5.3)
RBC # BLD: 3.82 M/UL (ref 3.95–5.11)
SEG NEUTROPHILS: 64 % (ref 36–65)
SEGMENTED NEUTROPHILS ABSOLUTE COUNT: 3.77 K/UL (ref 1.5–8.1)
SODIUM BLD-SCNC: 139 MMOL/L (ref 135–144)
THYROXINE, FREE: 1.15 NG/DL (ref 0.93–1.7)
TOTAL PROTEIN: 6.7 G/DL (ref 6.4–8.3)
TRIGLYCERIDE, FASTING: 170 MG/DL
TSH SERPL DL<=0.05 MIU/L-ACNC: 3.12 MIU/L (ref 0.3–5)
VITAMIN D 25-HYDROXY: 31.3 NG/ML
WBC # BLD: 6 K/UL (ref 3.5–11.3)

## 2022-05-06 DIAGNOSIS — I10 ESSENTIAL HYPERTENSION: ICD-10-CM

## 2022-05-06 RX ORDER — HYDROCHLOROTHIAZIDE 25 MG/1
TABLET ORAL
Qty: 90 TABLET | Refills: 1 | Status: SHIPPED | OUTPATIENT
Start: 2022-05-06 | End: 2022-05-09

## 2022-05-06 NOTE — TELEPHONE ENCOUNTER
Torri De Guzman is calling to request a refill on the following medication(s):    Medication Request:  Requested Prescriptions     Pending Prescriptions Disp Refills    hydroCHLOROthiazide (HYDRODIURIL) 25 MG tablet [Pharmacy Med Name: hydroCHLOROthiazide 25 MG TABLET] 90 tablet      Sig: TAKE ONE TABLET BY MOUTH EVERY MORNING       Last Visit Date (If Applicable):  8/2/0026    Next Visit Date:    6/7/2022

## 2022-05-08 DIAGNOSIS — I10 ESSENTIAL HYPERTENSION: ICD-10-CM

## 2022-05-09 RX ORDER — HYDROCHLOROTHIAZIDE 25 MG/1
TABLET ORAL
Qty: 90 TABLET | Refills: 1 | Status: SHIPPED | OUTPATIENT
Start: 2022-05-09 | End: 2022-05-16 | Stop reason: SDUPTHER

## 2022-05-09 NOTE — TELEPHONE ENCOUNTER
Corona Olivia is calling to request a refill on the following medication(s):    Medication Request:  Requested Prescriptions     Pending Prescriptions Disp Refills    hydroCHLOROthiazide (HYDRODIURIL) 25 MG tablet [Pharmacy Med Name: hydroCHLOROthiazide 25 MG TABLET] 90 tablet 1     Sig: TAKE ONE TABLET BY MOUTH EVERY MORNING       Last Visit Date (If Applicable):  9/9/2353    Next Visit Date:    6/7/2022

## 2022-05-16 ENCOUNTER — PATIENT MESSAGE (OUTPATIENT)
Dept: FAMILY MEDICINE CLINIC | Age: 72
End: 2022-05-16

## 2022-05-16 DIAGNOSIS — I10 ESSENTIAL HYPERTENSION: ICD-10-CM

## 2022-05-16 RX ORDER — HYDROCHLOROTHIAZIDE 25 MG/1
TABLET ORAL
Qty: 90 TABLET | Refills: 1 | Status: SHIPPED | OUTPATIENT
Start: 2022-05-16

## 2022-05-16 NOTE — TELEPHONE ENCOUNTER
From: Elena Covington  To: Dr. Hillary Reid: 5/16/2022 1:45 PM EDT  Subject: Re Fill     Need Re fill on my   HydroCHLOROthiazide 25 MG TABLETS

## 2022-06-07 ENCOUNTER — OFFICE VISIT (OUTPATIENT)
Dept: FAMILY MEDICINE CLINIC | Age: 72
End: 2022-06-07
Payer: MEDICARE

## 2022-06-07 VITALS
BODY MASS INDEX: 53.18 KG/M2 | TEMPERATURE: 97.2 F | HEART RATE: 72 BPM | DIASTOLIC BLOOD PRESSURE: 69 MMHG | SYSTOLIC BLOOD PRESSURE: 123 MMHG | WEIGHT: 289 LBS | HEIGHT: 62 IN

## 2022-06-07 DIAGNOSIS — N18.31 STAGE 3A CHRONIC KIDNEY DISEASE (HCC): ICD-10-CM

## 2022-06-07 DIAGNOSIS — E89.0 POSTABLATIVE HYPOTHYROIDISM: ICD-10-CM

## 2022-06-07 DIAGNOSIS — E78.2 MIXED HYPERLIPIDEMIA: ICD-10-CM

## 2022-06-07 DIAGNOSIS — F32.0 CURRENT MILD EPISODE OF MAJOR DEPRESSIVE DISORDER WITHOUT PRIOR EPISODE (HCC): ICD-10-CM

## 2022-06-07 DIAGNOSIS — R73.03 PREDIABETES: ICD-10-CM

## 2022-06-07 DIAGNOSIS — I10 ESSENTIAL HYPERTENSION: Primary | ICD-10-CM

## 2022-06-07 PROBLEM — I50.22 CHRONIC SYSTOLIC (CONGESTIVE) HEART FAILURE (HCC): Status: ACTIVE | Noted: 2022-06-07

## 2022-06-07 PROBLEM — N18.30 CHRONIC RENAL DISEASE, STAGE III (HCC): Status: ACTIVE | Noted: 2022-06-07

## 2022-06-07 PROCEDURE — G8417 CALC BMI ABV UP PARAM F/U: HCPCS | Performed by: STUDENT IN AN ORGANIZED HEALTH CARE EDUCATION/TRAINING PROGRAM

## 2022-06-07 PROCEDURE — 1036F TOBACCO NON-USER: CPT | Performed by: STUDENT IN AN ORGANIZED HEALTH CARE EDUCATION/TRAINING PROGRAM

## 2022-06-07 PROCEDURE — 99214 OFFICE O/P EST MOD 30 MIN: CPT | Performed by: STUDENT IN AN ORGANIZED HEALTH CARE EDUCATION/TRAINING PROGRAM

## 2022-06-07 PROCEDURE — 1090F PRES/ABSN URINE INCON ASSESS: CPT | Performed by: STUDENT IN AN ORGANIZED HEALTH CARE EDUCATION/TRAINING PROGRAM

## 2022-06-07 PROCEDURE — 1123F ACP DISCUSS/DSCN MKR DOCD: CPT | Performed by: STUDENT IN AN ORGANIZED HEALTH CARE EDUCATION/TRAINING PROGRAM

## 2022-06-07 PROCEDURE — G8427 DOCREV CUR MEDS BY ELIG CLIN: HCPCS | Performed by: STUDENT IN AN ORGANIZED HEALTH CARE EDUCATION/TRAINING PROGRAM

## 2022-06-07 PROCEDURE — 3017F COLORECTAL CA SCREEN DOC REV: CPT | Performed by: STUDENT IN AN ORGANIZED HEALTH CARE EDUCATION/TRAINING PROGRAM

## 2022-06-07 PROCEDURE — G8399 PT W/DXA RESULTS DOCUMENT: HCPCS | Performed by: STUDENT IN AN ORGANIZED HEALTH CARE EDUCATION/TRAINING PROGRAM

## 2022-06-07 SDOH — ECONOMIC STABILITY: TRANSPORTATION INSECURITY
IN THE PAST 12 MONTHS, HAS LACK OF TRANSPORTATION KEPT YOU FROM MEETINGS, WORK, OR FROM GETTING THINGS NEEDED FOR DAILY LIVING?: NO

## 2022-06-07 SDOH — ECONOMIC STABILITY: TRANSPORTATION INSECURITY
IN THE PAST 12 MONTHS, HAS THE LACK OF TRANSPORTATION KEPT YOU FROM MEDICAL APPOINTMENTS OR FROM GETTING MEDICATIONS?: NO

## 2022-06-07 SDOH — ECONOMIC STABILITY: FOOD INSECURITY: WITHIN THE PAST 12 MONTHS, THE FOOD YOU BOUGHT JUST DIDN'T LAST AND YOU DIDN'T HAVE MONEY TO GET MORE.: NEVER TRUE

## 2022-06-07 SDOH — ECONOMIC STABILITY: FOOD INSECURITY: WITHIN THE PAST 12 MONTHS, YOU WORRIED THAT YOUR FOOD WOULD RUN OUT BEFORE YOU GOT MONEY TO BUY MORE.: NEVER TRUE

## 2022-06-07 ASSESSMENT — PATIENT HEALTH QUESTIONNAIRE - PHQ9
SUM OF ALL RESPONSES TO PHQ QUESTIONS 1-9: 0
1. LITTLE INTEREST OR PLEASURE IN DOING THINGS: 0
4. FEELING TIRED OR HAVING LITTLE ENERGY: 0
SUM OF ALL RESPONSES TO PHQ QUESTIONS 1-9: 0
6. FEELING BAD ABOUT YOURSELF - OR THAT YOU ARE A FAILURE OR HAVE LET YOURSELF OR YOUR FAMILY DOWN: 0
7. TROUBLE CONCENTRATING ON THINGS, SUCH AS READING THE NEWSPAPER OR WATCHING TELEVISION: 0
8. MOVING OR SPEAKING SO SLOWLY THAT OTHER PEOPLE COULD HAVE NOTICED. OR THE OPPOSITE, BEING SO FIGETY OR RESTLESS THAT YOU HAVE BEEN MOVING AROUND A LOT MORE THAN USUAL: 0
10. IF YOU CHECKED OFF ANY PROBLEMS, HOW DIFFICULT HAVE THESE PROBLEMS MADE IT FOR YOU TO DO YOUR WORK, TAKE CARE OF THINGS AT HOME, OR GET ALONG WITH OTHER PEOPLE: 0
3. TROUBLE FALLING OR STAYING ASLEEP: 0
SUM OF ALL RESPONSES TO PHQ QUESTIONS 1-9: 0
9. THOUGHTS THAT YOU WOULD BE BETTER OFF DEAD, OR OF HURTING YOURSELF: 0
5. POOR APPETITE OR OVEREATING: 0
SUM OF ALL RESPONSES TO PHQ QUESTIONS 1-9: 0
2. FEELING DOWN, DEPRESSED OR HOPELESS: 0
SUM OF ALL RESPONSES TO PHQ9 QUESTIONS 1 & 2: 0

## 2022-06-07 ASSESSMENT — ENCOUNTER SYMPTOMS
DIARRHEA: 0
NAUSEA: 0
EYE DISCHARGE: 0
CONSTIPATION: 0
COUGH: 0
CHEST TIGHTNESS: 0
VOMITING: 0
SORE THROAT: 0
SHORTNESS OF BREATH: 0
ABDOMINAL PAIN: 0
WHEEZING: 0

## 2022-06-07 ASSESSMENT — SOCIAL DETERMINANTS OF HEALTH (SDOH): HOW HARD IS IT FOR YOU TO PAY FOR THE VERY BASICS LIKE FOOD, HOUSING, MEDICAL CARE, AND HEATING?: SOMEWHAT HARD

## 2022-06-07 NOTE — PROGRESS NOTES
[address] PRIMARY CARE  [address] [place] [address] [address] [place] [address]  Dept: [phone]  Dept Fax: [phone]    Tito Hill is a 74 y.o. female who is a Established patient, who presents today for her medical conditions/complaints as noted below:  Chief Complaint   Patient presents with    Hypertension         HPI:     She is here today to follow-up on hypertension and anxiety. She says that she has been doing well on current medications and feels that her mood is slowly getting better. She is also going out a little bit more and says that she tries to walk around in grocery stores using a cart to help her support herself. She says she does not want to walk outside because she does not want to use a cane in front of her neighbors. She had labs done few months ago which showed elevated blood sugars and lipids. She says that she has been eating a lot of sweet treats including cakes and cookies very frequently. She also has noticed weight gain over past few months and says that she is not taking levothyroxine at all and she does not want to follow with any endocrinology. She is agreeable to getting her thyroid levels retested. She declined any immunizations today.       Hemoglobin A1C (%)   Date Value   03/09/2022 6.3 (H)             ( goal A1Cis < 7)   No results found for: LABMICR  LDL Cholesterol (mg/dL)   Date Value   03/09/2022 128   01/04/2021 68       (goal LDL is <100)   AST (U/L)   Date Value   03/09/2022 16     ALT (U/L)   Date Value   03/09/2022 11     BUN (mg/dL)   Date Value   03/09/2022 15     BP Readings from Last 3 Encounters:   06/07/22 123/69   03/07/22 124/80   12/06/21 (!) 140/82          (goal 120/80)    Past Medical History:   Diagnosis Date    Acute pulmonary embolism (HCC)     COPD (chronic obstructive pulmonary disease) (HCC)     Disease of blood and blood forming organ     Elevated blood pressure readings 1/4/2021    Hypertension       Past Surgical History:   Procedure Laterality Date    KNEE ARTHROSCOPY         Family History   Problem Relation Age of Onset    Cancer Mother     Kidney Disease Father        Social History     Tobacco Use    Smoking status: Never Smoker    Smokeless tobacco: Never Used   Substance Use Topics    Alcohol use: Never      Current Outpatient Medications   Medication Sig Dispense Refill    hydroCHLOROthiazide (HYDRODIURIL) 25 MG tablet TAKE ONE TABLET BY MOUTH EVERY MORNING 90 tablet 1    aspirin (ASPIRIN ADULT LOW STRENGTH) 81 MG EC tablet Take 1 tablet by mouth daily 90 tablet 1    buPROPion (WELLBUTRIN XL) 150 MG extended release tablet Take 1 tablet by mouth every morning 90 tablet 1    LYMPHEDEMA PUMP 1 Device as needed (leg swelling) 1 each 0    Blood Pressure KIT Use to check BP twice a day 1 kit 0    Cholecalciferol (VITAMIN D3) 50 MCG (2000 UT) TABS Take 1 tablet by mouth daily 30 tablet 5     No current facility-administered medications for this visit. No Known Allergies    Health Maintenance   Topic Date Due    COVID-19 Vaccine (1) Never done    DTaP/Tdap/Td vaccine (1 - Tdap) Never done    Shingles vaccine (1 of 2) Never done    Depression Monitoring  05/20/2022    Annual Wellness Visit (AWV)  05/21/2022    Flu vaccine (Season Ended) 10/28/2022 (Originally 9/1/2022)    Pneumococcal 65+ years Vaccine (2 - PCV) 07/26/2022    A1C test (Diabetic or Prediabetic)  03/09/2023    Breast cancer screen  11/03/2023    Colorectal Cancer Screen  05/04/2024    Lipids  03/09/2027    DEXA (modify frequency per FRAX score)  Completed    Hepatitis C screen  Completed    Hepatitis A vaccine  Aged Out    Hepatitis B vaccine  Aged Out    Hib vaccine  Aged Out    Meningococcal (ACWY) vaccine  Aged Out       Subjective:     Review of Systems   Constitutional: Negative for appetite change, fatigue and fever. HENT: Negative for congestion, ear pain, hearing loss and sore throat.     Eyes: Negative for discharge and visual disturbance. Respiratory: Negative for cough, chest tightness, shortness of breath and wheezing. Cardiovascular: Negative for chest pain, palpitations and leg swelling. Gastrointestinal: Negative for abdominal pain, constipation, diarrhea, nausea and vomiting. Genitourinary: Negative for flank pain, frequency, hematuria and urgency. Musculoskeletal: Negative for arthralgias, gait problem, joint swelling and myalgias. Skin: Negative. Neurological: Negative for dizziness, weakness, numbness and headaches. Psychiatric/Behavioral: Positive for dysphoric mood. The patient is not nervous/anxious. Objective:     Physical Exam  Vitals reviewed. Constitutional:       Appearance: Normal appearance. She is obese. HENT:      Head: Normocephalic and atraumatic. Nose: Nose normal.      Mouth/Throat:      Mouth: Mucous membranes are moist.      Pharynx: Oropharynx is clear. Eyes:      Extraocular Movements: Extraocular movements intact. Conjunctiva/sclera: Conjunctivae normal.      Pupils: Pupils are equal, round, and reactive to light. Cardiovascular:      Rate and Rhythm: Normal rate and regular rhythm. Heart sounds: Normal heart sounds. No murmur heard. No gallop. Pulmonary:      Effort: Pulmonary effort is normal. No respiratory distress. Breath sounds: Normal breath sounds. No stridor. No wheezing. Abdominal:      General: Bowel sounds are normal. There is no distension. Palpations: Abdomen is soft. Tenderness: There is no abdominal tenderness. There is no guarding or rebound. Musculoskeletal:         General: No swelling or tenderness. Normal range of motion. Cervical back: Normal range of motion and neck supple. Skin:     General: Skin is warm and dry. Coloration: Skin is not jaundiced. Findings: No rash. Neurological:      General: No focal deficit present.       Mental Status: She is alert and oriented to person, place, and time. Psychiatric:         Mood and Affect: Mood normal.         Behavior: Behavior normal.         Thought Content: Thought content normal.         Judgment: Judgment normal.       /69 (Site: Left Wrist, Position: Sitting, Cuff Size: Child)   Pulse 72   Temp 97.2 °F (36.2 °C) (Temporal)   Ht 5' 2\" (1.575 m)   Wt 289 lb (131.1 kg)   BMI 52.86 kg/m²     Assessment/Plan:   1. Essential hypertension  -     Comprehensive Metabolic Panel, Fasting; Future  2. Current mild episode of major depressive disorder without [facility] episode [facility]  3. Postablative hypothyroidism  -     TSH with Reflex; Future  4. Prediabetes  -     Hemoglobin A1C; Future  5. Mixed hyperlipidemia  -     Lipid, Fasting; Future  6. Stage 3a chronic kidney disease (HCC)  -     Comprehensive Metabolic Panel, Fasting; Future  7. Body mass index (BMI) 50.0-59.9, adult ([facility])    Hypertension-controlled, continue hydrochlorothiazide 25 mg daily    Depression- improving on Wellbutrin 150 mg daily    Hypothyroidism-no longer taking levothyroxine, does not want to follow with endocrinology. Repeat thyroid levels ordered    Prediabetes-recent A1c 6.3, not on any medications, discussed avoiding sugary drinks and treats and increasing physical activity    Hyperlipidemia-not on any medications, discussed dietary modifications and recheck in 3 months    CKD stage III-recent labs showed slightly elevated creatinine and low GFR, repeat ordered. Return in about 3 months (around 9/7/2022) for medicare annual wellness visit.     Orders Placed This Encounter   Procedures    Lipid, Fasting     Standing Status:   Future     Standing Expiration Date:   12/7/2022    Hemoglobin A1C     Standing Status:   Future     Standing Expiration Date:   12/7/2022    TSH with Reflex     Standing Status:   Future     Standing Expiration Date:   12/7/2022    Comprehensive Metabolic Panel, Fasting     Standing Status:   Future     Standing Expiration Date: 12/7/2022     No orders of the defined types were placed in this encounter. Patient given educational materials - see patient instructions. Discussed use, benefit, and side effects of prescribed medications. All patientquestions answered. Pt voiced understanding. Reviewed health maintenance. Instructedto continue current medications, diet and exercise. Patient agreed with treatmentplan. Follow up as directed.      Electronically signed by Uri Tristan MD on 6/7/2022 at 5:05 PM

## 2022-07-05 DIAGNOSIS — I10 ESSENTIAL HYPERTENSION: ICD-10-CM

## 2022-07-05 RX ORDER — ASPIRIN 81 MG/1
81 TABLET ORAL DAILY
Qty: 90 TABLET | Refills: 1 | Status: SHIPPED | OUTPATIENT
Start: 2022-07-05

## 2022-07-05 NOTE — TELEPHONE ENCOUNTER
Mariano Robb is calling to request a refill on the following medication(s):    Medication Request:  Requested Prescriptions     Pending Prescriptions Disp Refills    aspirin (ASPIRIN ADULT LOW STRENGTH) 81 MG EC tablet 90 tablet 1     Sig: Take 1 tablet by mouth daily       Last Visit Date (If Applicable):  6/7/3610    Next Visit Date:    9/13/2022

## 2022-08-13 DIAGNOSIS — F32.0 CURRENT MILD EPISODE OF MAJOR DEPRESSIVE DISORDER WITHOUT PRIOR EPISODE (HCC): ICD-10-CM

## 2022-08-14 DIAGNOSIS — E55.9 VITAMIN D DEFICIENCY: ICD-10-CM

## 2022-08-15 RX ORDER — BUPROPION HYDROCHLORIDE 150 MG/1
TABLET ORAL
Qty: 30 TABLET | Refills: 1 | Status: SHIPPED | OUTPATIENT
Start: 2022-08-15

## 2022-08-15 RX ORDER — CHOLECALCIFEROL (VITAMIN D3) 125 MCG
CAPSULE ORAL
Qty: 30 TABLET | Refills: 5 | Status: SHIPPED | OUTPATIENT
Start: 2022-08-15

## 2022-08-15 NOTE — TELEPHONE ENCOUNTER
Horacio Xiao is calling to request a refill on the following medication(s):    Medication Request:  Requested Prescriptions     Pending Prescriptions Disp Refills    buPROPion (WELLBUTRIN XL) 150 MG extended release tablet [Pharmacy Med Name: buPROPion HCL  MG TABLET] 30 tablet      Sig: TAKE ONE TABLET BY MOUTH EVERY MORNING       Last Visit Date (If Applicable):  4/7/7657    Next Visit Date:    8/14/2022

## 2022-09-09 ENCOUNTER — HOSPITAL ENCOUNTER (OUTPATIENT)
Age: 72
Setting detail: SPECIMEN
Discharge: HOME | End: 2022-09-09

## 2022-09-09 DIAGNOSIS — E78.2 MIXED HYPERLIPIDEMIA: ICD-10-CM

## 2022-09-09 DIAGNOSIS — R73.03 PREDIABETES: ICD-10-CM

## 2022-09-09 DIAGNOSIS — N18.31 STAGE 3A CHRONIC KIDNEY DISEASE (HCC): ICD-10-CM

## 2022-09-09 DIAGNOSIS — I10 ESSENTIAL HYPERTENSION: ICD-10-CM

## 2022-09-09 DIAGNOSIS — E89.0 POSTABLATIVE HYPOTHYROIDISM: ICD-10-CM

## 2022-09-09 LAB
ALBUMIN SERPL-MCNC: 4.1 G/DL (ref 3.5–5.2)
ALBUMIN/GLOBULIN RATIO: 1.3 (ref 1–2.5)
ALP BLD-CCNC: 81 U/L (ref 35–104)
ALT SERPL-CCNC: 13 U/L (ref 5–33)
ANION GAP SERPL CALCULATED.3IONS-SCNC: 14 MMOL/L (ref 9–17)
AST SERPL-CCNC: 21 U/L
BILIRUB SERPL-MCNC: 0.2 MG/DL (ref 0.3–1.2)
BUN BLDV-MCNC: 14 MG/DL (ref 8–23)
CALCIUM SERPL-MCNC: 9.3 MG/DL (ref 8.6–10.4)
CHLORIDE BLD-SCNC: 102 MMOL/L (ref 98–107)
CHOLESTEROL, FASTING: 189 MG/DL
CHOLESTEROL/HDL RATIO: 3.5
CO2: 23 MMOL/L (ref 20–31)
CREAT SERPL-MCNC: 1 MG/DL (ref 0.5–0.9)
ESTIMATED AVERAGE GLUCOSE: 128 MG/DL
GFR AFRICAN AMERICAN: >60 ML/MIN
GFR NON-AFRICAN AMERICAN: 54 ML/MIN
GFR SERPL CREATININE-BSD FRML MDRD: ABNORMAL ML/MIN/{1.73_M2}
GLUCOSE FASTING: 98 MG/DL (ref 70–99)
HBA1C MFR BLD: 6.1 % (ref 4–6)
HDLC SERPL-MCNC: 54 MG/DL
LDL CHOLESTEROL: 98 MG/DL (ref 0–130)
POTASSIUM SERPL-SCNC: 4 MMOL/L (ref 3.7–5.3)
SODIUM BLD-SCNC: 139 MMOL/L (ref 135–144)
TOTAL PROTEIN: 7.2 G/DL (ref 6.4–8.3)
TRIGLYCERIDE, FASTING: 187 MG/DL
TSH SERPL DL<=0.05 MIU/L-ACNC: 3.35 UIU/ML (ref 0.3–5)

## 2022-09-13 ENCOUNTER — OFFICE VISIT (OUTPATIENT)
Dept: FAMILY MEDICINE CLINIC | Age: 72
End: 2022-09-13
Payer: MEDICARE

## 2022-09-13 VITALS
HEIGHT: 62 IN | HEART RATE: 78 BPM | TEMPERATURE: 97.1 F | WEIGHT: 284.7 LBS | OXYGEN SATURATION: 99 % | SYSTOLIC BLOOD PRESSURE: 125 MMHG | DIASTOLIC BLOOD PRESSURE: 73 MMHG | BODY MASS INDEX: 52.39 KG/M2

## 2022-09-13 DIAGNOSIS — M25.561 CHRONIC PAIN OF BOTH KNEES: ICD-10-CM

## 2022-09-13 DIAGNOSIS — Z00.00 MEDICARE ANNUAL WELLNESS VISIT, SUBSEQUENT: Primary | ICD-10-CM

## 2022-09-13 DIAGNOSIS — M25.562 CHRONIC PAIN OF BOTH KNEES: ICD-10-CM

## 2022-09-13 DIAGNOSIS — G89.29 CHRONIC PAIN OF BOTH KNEES: ICD-10-CM

## 2022-09-13 DIAGNOSIS — I27.20 MILD PULMONARY HYPERTENSION (HCC): ICD-10-CM

## 2022-09-13 PROCEDURE — G0439 PPPS, SUBSEQ VISIT: HCPCS | Performed by: STUDENT IN AN ORGANIZED HEALTH CARE EDUCATION/TRAINING PROGRAM

## 2022-09-13 PROCEDURE — 1123F ACP DISCUSS/DSCN MKR DOCD: CPT | Performed by: STUDENT IN AN ORGANIZED HEALTH CARE EDUCATION/TRAINING PROGRAM

## 2022-09-13 PROCEDURE — 3017F COLORECTAL CA SCREEN DOC REV: CPT | Performed by: STUDENT IN AN ORGANIZED HEALTH CARE EDUCATION/TRAINING PROGRAM

## 2022-09-13 RX ORDER — ACETAMINOPHEN 325 MG/1
650 TABLET ORAL EVERY 6 HOURS PRN
Qty: 120 TABLET | Refills: 3 | Status: SHIPPED | OUTPATIENT
Start: 2022-09-13

## 2022-09-13 ASSESSMENT — LIFESTYLE VARIABLES: HOW MANY STANDARD DRINKS CONTAINING ALCOHOL DO YOU HAVE ON A TYPICAL DAY: PATIENT DOES NOT DRINK

## 2022-09-13 ASSESSMENT — PATIENT HEALTH QUESTIONNAIRE - PHQ9
10. IF YOU CHECKED OFF ANY PROBLEMS, HOW DIFFICULT HAVE THESE PROBLEMS MADE IT FOR YOU TO DO YOUR WORK, TAKE CARE OF THINGS AT HOME, OR GET ALONG WITH OTHER PEOPLE: 2
6. FEELING BAD ABOUT YOURSELF - OR THAT YOU ARE A FAILURE OR HAVE LET YOURSELF OR YOUR FAMILY DOWN: 1
SUM OF ALL RESPONSES TO PHQ QUESTIONS 1-9: 11
8. MOVING OR SPEAKING SO SLOWLY THAT OTHER PEOPLE COULD HAVE NOTICED. OR THE OPPOSITE, BEING SO FIGETY OR RESTLESS THAT YOU HAVE BEEN MOVING AROUND A LOT MORE THAN USUAL: 0
SUM OF ALL RESPONSES TO PHQ QUESTIONS 1-9: 11
3. TROUBLE FALLING OR STAYING ASLEEP: 0
5. POOR APPETITE OR OVEREATING: 3
SUM OF ALL RESPONSES TO PHQ QUESTIONS 1-9: 11
9. THOUGHTS THAT YOU WOULD BE BETTER OFF DEAD, OR OF HURTING YOURSELF: 0
1. LITTLE INTEREST OR PLEASURE IN DOING THINGS: 2
SUM OF ALL RESPONSES TO PHQ QUESTIONS 1-9: 11
4. FEELING TIRED OR HAVING LITTLE ENERGY: 3
SUM OF ALL RESPONSES TO PHQ9 QUESTIONS 1 & 2: 4
2. FEELING DOWN, DEPRESSED OR HOPELESS: 2
7. TROUBLE CONCENTRATING ON THINGS, SUCH AS READING THE NEWSPAPER OR WATCHING TELEVISION: 0

## 2022-09-13 NOTE — PROGRESS NOTES
Medicare Annual Wellness Visit    Tor Mcmahon is here for Medicare AWV and Hypertension    Assessment & Plan   Medicare annual wellness visit, subsequent  Chronic pain of both knees  -     XR KNEE LEFT (3 VIEWS); Future  -     XR KNEE RIGHT (3 VIEWS); Future  -     acetaminophen (AMINOFEN) 325 MG tablet; Take 2 tablets by mouth every 6 hours as needed for Pain, Disp-120 tablet, R-3Normal  Mild pulmonary hypertension ([address]    Recommendations for Preventive Services Due: see orders and patient instructions/AVS.  Recommended screening schedule for the next 5-10 years is provided to the patient in written form: see Patient Instructions/AVS.     Return in 3 months (on 12/13/2022) for Medicare Annual Wellness Visit in 1 year, Follow up HTN. Subjective   The following acute and/or chronic problems were also addressed today: She complains of chronic pain in both knees and would like to see an orthopedic surgeon. She has never had any x-rays for the knee. Her recent serum creatinine was elevated and she has been taking ibuprofen frequently for the knee pain, discussed taking Tylenol instead. Says that Wellbutrin is not making much of a difference. She has significant stressors with family issues and has been dealing with her brother regarding her dad's property. Does not want to follow-up with psychotherapy. Agrees to try to walk to relieve stress. Patient's complete Health Risk Assessment and screening values have been reviewed and are found in Flowsheets. The following problems were reviewed today and where indicated follow up appointments were made and/or referrals ordered.     Positive Risk Factor Screenings with Interventions:      Depression:  PHQ-2 Score: 4  PHQ-9 Total Score: 11    Severity:1-4 = minimal depression, 5-9 = mild depression, 10-14 = moderate depression, 15-19 = moderately severe depression, 20-27 = severe depression  Depression Interventions:  Regular exercise recommended- 3-5 times banister?: Yes  Do you always fasten your seatbelt when you are in a car?: Yes  Safety Interventions:  Patient declines any further evaluation/treatment for this issue           Objective   Vitals:    09/13/22 1502   BP: 125/73   Pulse: 78   Temp: 97.1 °F (36.2 °C)   SpO2: 99%   Weight: 284 lb 11.2 oz (129.1 kg)   Height: 5' 2\" (1.575 m)      Body mass index is 52.07 kg/m². General Appearance: alert and oriented to person, place and time, well developed and well- nourished, in no acute distress  Skin: warm and dry, no rash or erythema  Head: normocephalic and atraumatic  Eyes: pupils equal, round, and reactive to light, extraocular eye movements intact, conjunctivae normal  ENT: tympanic membrane, external ear and ear canal normal bilaterally, nose without deformity, nasal mucosa and turbinates normal without polyps  Neck: supple and non-tender without mass, no thyromegaly or thyroid nodules, no cervical lymphadenopathy  Pulmonary/Chest: clear to auscultation bilaterally- no wheezes, rales or rhonchi, normal air movement, no respiratory distress  Cardiovascular: normal rate, regular rhythm, normal S1 and S2, no murmurs, rubs, clicks, or gallops, distal pulses intact, no carotid bruits  Abdomen: soft, non-tender, non-distended, normal bowel sounds, no masses or organomegaly  Extremities: no cyanosis, clubbing or edema  Musculoskeletal: normal range of motion, no joint swelling, deformity or tenderness  Neurologic: reflexes normal and symmetric, no cranial nerve deficit, gait, coordination and speech normal       No Known Allergies  Prior to Visit Medications    Medication Sig Taking?  Authorizing Provider   acetaminophen (AMINOFEN) 325 MG tablet Take 2 tablets by mouth every 6 hours as needed for Pain Yes Juan Luis Jasso MD   buPROPion (WELLBUTRIN XL) 150 MG extended release tablet TAKE ONE TABLET BY MOUTH EVERY MORNING Yes Juan Luis Jasso MD   Cholecalciferol (VITAMIN D3) 50 MCG (2000 UT) TABS TAKE ONE TABLET BY

## 2022-09-13 NOTE — PATIENT INSTRUCTIONS
Personalized Preventive Plan for Hannah Balbuena - 9/13/2022  Medicare offers a range of preventive health benefits. Some of the tests and screenings are paid in full while other may be subject to a deductible, co-insurance, and/or copay. Some of these benefits include a comprehensive review of your medical history including lifestyle, illnesses that may run in your family, and various assessments and screenings as appropriate. After reviewing your medical record and screening and assessments performed today your provider may have ordered immunizations, labs, imaging, and/or referrals for you. A list of these orders (if applicable) as well as your Preventive Care list are included within your After Visit Summary for your review. Other Preventive Recommendations:    A preventive eye exam performed by an eye specialist is recommended every 1-2 years to screen for glaucoma; cataracts, macular degeneration, and other eye disorders. A preventive dental visit is recommended every 6 months. Try to get at least 150 minutes of exercise per week or 10,000 steps per day on a pedometer . Order or download the FREE \"Exercise & Physical Activity: Your Everyday Guide\" from [employer]. Call 9-535.486.3671 or search [employer] online. You need 2930-2754 mg of calcium and 8872-1079 IU of vitamin D per day. It is possible to meet your calcium requirement with diet alone, but a vitamin D supplement is usually necessary to meet this goal.  When exposed to the sun, use a sunscreen that protects against both UVA and UVB radiation with an SPF of 30 or greater. Reapply every 2 to 3 hours or after sweating, drying off with a towel, or swimming. Always wear a seat belt when traveling in a car. Always wear a helmet when riding a bicycle or motorcycle.

## 2022-09-19 ENCOUNTER — HOSPITAL ENCOUNTER (OUTPATIENT)
Dept: GENERAL RADIOLOGY | Facility: CLINIC | Age: 72
Discharge: HOME | End: 2022-09-21
Payer: MEDICARE

## 2022-09-19 ENCOUNTER — HOSPITAL ENCOUNTER (OUTPATIENT)
Facility: CLINIC | Age: 72
Discharge: HOME | End: 2022-09-21
Payer: MEDICARE

## 2022-09-19 DIAGNOSIS — G89.29 CHRONIC PAIN OF BOTH KNEES: ICD-10-CM

## 2022-09-19 DIAGNOSIS — M25.562 CHRONIC PAIN OF BOTH KNEES: ICD-10-CM

## 2022-09-19 DIAGNOSIS — M25.561 CHRONIC PAIN OF BOTH KNEES: ICD-10-CM

## 2022-09-19 PROCEDURE — 73562 X-RAY EXAM OF KNEE 3: CPT

## 2022-09-20 DIAGNOSIS — M17.0 PRIMARY OSTEOARTHRITIS OF BOTH KNEES: Primary | ICD-10-CM

## 2022-11-07 DIAGNOSIS — I10 ESSENTIAL HYPERTENSION: ICD-10-CM

## 2022-11-07 RX ORDER — HYDROCHLOROTHIAZIDE 25 MG/1
TABLET ORAL
Qty: 90 TABLET | Refills: 1 | Status: SHIPPED | OUTPATIENT
Start: 2022-11-07

## 2022-11-07 NOTE — TELEPHONE ENCOUNTER
Jeremy Alas is calling to request a refill on the following medication(s):    Medication Request:  Requested Prescriptions     Pending Prescriptions Disp Refills    hydroCHLOROthiazide (HYDRODIURIL) 25 MG tablet 90 tablet 1     Sig: TAKE ONE TABLET BY MOUTH EVERY MORNING       Last Visit Date (If Applicable):  0/50/4336    Next Visit Date:    12/13/2022

## 2022-12-12 ENCOUNTER — HOSPITAL ENCOUNTER (OUTPATIENT)
Dept: MAMMOGRAPHY | Age: 72
Discharge: HOME | End: 2022-12-14
Payer: MEDICARE

## 2022-12-12 DIAGNOSIS — Z12.31 VISIT FOR SCREENING MAMMOGRAM: ICD-10-CM

## 2022-12-12 PROCEDURE — 77063 BREAST TOMOSYNTHESIS BI: CPT

## 2022-12-13 ENCOUNTER — OFFICE VISIT (OUTPATIENT)
Dept: FAMILY MEDICINE CLINIC | Age: 72
End: 2022-12-13
Payer: MEDICARE

## 2022-12-13 VITALS
HEART RATE: 102 BPM | DIASTOLIC BLOOD PRESSURE: 81 MMHG | SYSTOLIC BLOOD PRESSURE: 139 MMHG | BODY MASS INDEX: 48.91 KG/M2 | OXYGEN SATURATION: 97 % | TEMPERATURE: 97.1 F | HEIGHT: 62 IN | WEIGHT: 265.8 LBS

## 2022-12-13 DIAGNOSIS — F32.0 CURRENT MILD EPISODE OF MAJOR DEPRESSIVE DISORDER WITHOUT PRIOR EPISODE (HCC): ICD-10-CM

## 2022-12-13 DIAGNOSIS — M17.0 PRIMARY OSTEOARTHRITIS OF BOTH KNEES: ICD-10-CM

## 2022-12-13 DIAGNOSIS — I10 ESSENTIAL HYPERTENSION: Primary | ICD-10-CM

## 2022-12-13 PROCEDURE — G8484 FLU IMMUNIZE NO ADMIN: HCPCS | Performed by: STUDENT IN AN ORGANIZED HEALTH CARE EDUCATION/TRAINING PROGRAM

## 2022-12-13 PROCEDURE — 3078F DIAST BP <80 MM HG: CPT | Performed by: STUDENT IN AN ORGANIZED HEALTH CARE EDUCATION/TRAINING PROGRAM

## 2022-12-13 PROCEDURE — G8399 PT W/DXA RESULTS DOCUMENT: HCPCS | Performed by: STUDENT IN AN ORGANIZED HEALTH CARE EDUCATION/TRAINING PROGRAM

## 2022-12-13 PROCEDURE — 3074F SYST BP LT 130 MM HG: CPT | Performed by: STUDENT IN AN ORGANIZED HEALTH CARE EDUCATION/TRAINING PROGRAM

## 2022-12-13 PROCEDURE — 1123F ACP DISCUSS/DSCN MKR DOCD: CPT | Performed by: STUDENT IN AN ORGANIZED HEALTH CARE EDUCATION/TRAINING PROGRAM

## 2022-12-13 PROCEDURE — 1036F TOBACCO NON-USER: CPT | Performed by: STUDENT IN AN ORGANIZED HEALTH CARE EDUCATION/TRAINING PROGRAM

## 2022-12-13 PROCEDURE — G8427 DOCREV CUR MEDS BY ELIG CLIN: HCPCS | Performed by: STUDENT IN AN ORGANIZED HEALTH CARE EDUCATION/TRAINING PROGRAM

## 2022-12-13 PROCEDURE — 99214 OFFICE O/P EST MOD 30 MIN: CPT | Performed by: STUDENT IN AN ORGANIZED HEALTH CARE EDUCATION/TRAINING PROGRAM

## 2022-12-13 PROCEDURE — 1090F PRES/ABSN URINE INCON ASSESS: CPT | Performed by: STUDENT IN AN ORGANIZED HEALTH CARE EDUCATION/TRAINING PROGRAM

## 2022-12-13 PROCEDURE — G8417 CALC BMI ABV UP PARAM F/U: HCPCS | Performed by: STUDENT IN AN ORGANIZED HEALTH CARE EDUCATION/TRAINING PROGRAM

## 2022-12-13 PROCEDURE — 3017F COLORECTAL CA SCREEN DOC REV: CPT | Performed by: STUDENT IN AN ORGANIZED HEALTH CARE EDUCATION/TRAINING PROGRAM

## 2022-12-13 ASSESSMENT — ENCOUNTER SYMPTOMS
ABDOMINAL PAIN: 0
SHORTNESS OF BREATH: 0
SORE THROAT: 0
COUGH: 0
CONSTIPATION: 0
NAUSEA: 0
EYE DISCHARGE: 0
CHEST TIGHTNESS: 0
VOMITING: 0
DIARRHEA: 0
WHEEZING: 0

## 2022-12-13 ASSESSMENT — PATIENT HEALTH QUESTIONNAIRE - PHQ9
8. MOVING OR SPEAKING SO SLOWLY THAT OTHER PEOPLE COULD HAVE NOTICED. OR THE OPPOSITE, BEING SO FIGETY OR RESTLESS THAT YOU HAVE BEEN MOVING AROUND A LOT MORE THAN USUAL: 0
4. FEELING TIRED OR HAVING LITTLE ENERGY: 0
5. POOR APPETITE OR OVEREATING: 0
7. TROUBLE CONCENTRATING ON THINGS, SUCH AS READING THE NEWSPAPER OR WATCHING TELEVISION: 0
SUM OF ALL RESPONSES TO PHQ QUESTIONS 1-9: 0
9. THOUGHTS THAT YOU WOULD BE BETTER OFF DEAD, OR OF HURTING YOURSELF: 0
10. IF YOU CHECKED OFF ANY PROBLEMS, HOW DIFFICULT HAVE THESE PROBLEMS MADE IT FOR YOU TO DO YOUR WORK, TAKE CARE OF THINGS AT HOME, OR GET ALONG WITH OTHER PEOPLE: 0
SUM OF ALL RESPONSES TO PHQ QUESTIONS 1-9: 0
2. FEELING DOWN, DEPRESSED OR HOPELESS: 0
3. TROUBLE FALLING OR STAYING ASLEEP: 0
SUM OF ALL RESPONSES TO PHQ9 QUESTIONS 1 & 2: 0
6. FEELING BAD ABOUT YOURSELF - OR THAT YOU ARE A FAILURE OR HAVE LET YOURSELF OR YOUR FAMILY DOWN: 0
1. LITTLE INTEREST OR PLEASURE IN DOING THINGS: 0

## 2022-12-13 NOTE — PROGRESS NOTES
[address] PRIMARY CARE  [address] [place] [address] [address] [place] [address]  Dept: [phone]  Dept Fax: [phone]    Gaurav Scanlon is a 74 y.o. female who is a Established patient, who presents today for her medical conditions/complaints as noted below:  Chief Complaint   Patient presents with    Hypertension         HPI:     She is here today to follow-up on hypertension and anxiety. She says that she has been doing a lot better and has not been taking the Wellbutrin. She followed up with orthopedics for her bilateral osteoarthritis and was advised knee surgery. She says that the surgeon told her that she would need to lose about 40 to 50 pounds before he could operate on her. She says that since then she has lost about 30 pounds with eating healthier and moving around more. She says that she got steroid shots in both her knees which has helped her move around more easily. She will continue working on her weight so that she can get her knee surgery done soon.   She had a mammogram done today which was normal.      Hemoglobin A1C (%)   Date Value   09/09/2022 6.1 (H)   03/09/2022 6.3 (H)             ( goal A1Cis < 7)   No results found for: LABMICR  LDL Cholesterol (mg/dL)   Date Value   09/09/2022 98   03/09/2022 128   01/04/2021 68       (goal LDL is <100)   AST (U/L)   Date Value   09/09/2022 21     ALT (U/L)   Date Value   09/09/2022 13     BUN (mg/dL)   Date Value   09/09/2022 14     BP Readings from Last 3 Encounters:   12/13/22 139/81   09/13/22 125/73   06/07/22 123/69          (goal 120/80)    Past Medical History:   Diagnosis Date    Acute pulmonary embolism (HCC)     COPD (chronic obstructive pulmonary disease) (HCC)     Disease of blood and blood forming organ     Elevated blood pressure readings 1/4/2021    Hypertension       Past Surgical History:   Procedure Laterality Date    KNEE ARTHROSCOPY         Family History   Problem Relation Age of Onset    Cancer Mother Kidney Disease Father        Social History     Tobacco Use    Smoking status: Never    Smokeless tobacco: Never   Substance Use Topics    Alcohol use: Never      Current Outpatient Medications   Medication Sig Dispense Refill    acetaminophen (AMINOFEN) 325 MG tablet Take 2 tablets by mouth every 6 hours as needed for Pain 120 tablet 3    Cholecalciferol (VITAMIN D3) 50 MCG (2000 UT) TABS TAKE ONE TABLET BY MOUTH DAILY 30 tablet 5    aspirin (ASPIRIN ADULT LOW STRENGTH) 81 MG EC tablet Take 1 tablet by mouth daily 90 tablet 1    Blood Pressure KIT Use to check BP twice a day 1 kit 0    hydroCHLOROthiazide (HYDRODIURIL) 25 MG tablet TAKE ONE TABLET BY MOUTH EVERY MORNING (Patient not taking: Reported on 12/13/2022) 90 tablet 1     No current facility-administered medications for this visit. No Known Allergies    Health Maintenance   Topic Date Due    COVID-19 Vaccine (1) Never done    DTaP/Tdap/Td vaccine (1 - Tdap) 09/13/2023 (Originally 5/25/1969)    Flu vaccine (1) 09/13/2023 (Originally 8/1/2022)    Shingles vaccine (1 of 2) 09/13/2023 (Originally 5/25/1998)    A1C test (Diabetic or Prediabetic)  09/09/2023    Depression Monitoring  09/13/2023    Annual Wellness Visit (AWV)  09/14/2023    Colorectal Cancer Screen  05/04/2024    Breast cancer screen  12/12/2024    Lipids  09/09/2027    DEXA (modify frequency per FRAX score)  Completed    Pneumococcal 65+ years Vaccine  Completed    Hepatitis C screen  Completed    Hepatitis A vaccine  Aged Out    Hib vaccine  Aged Out    Meningococcal (ACWY) vaccine  Aged Out       Subjective:     Review of Systems   Constitutional:  Negative for appetite change, fatigue and fever. HENT:  Negative for congestion, ear pain, hearing loss and sore throat. Eyes:  Negative for discharge and visual disturbance. Respiratory:  Negative for cough, chest tightness, shortness of breath and wheezing. Cardiovascular:  Negative for chest pain, palpitations and leg swelling. Gastrointestinal:  Negative for abdominal pain, constipation, diarrhea, nausea and vomiting. Genitourinary:  Negative for flank pain, frequency, hematuria and urgency. Musculoskeletal:  Negative for arthralgias, gait problem, joint swelling and myalgias. Skin: Negative. Neurological:  Negative for dizziness, weakness, numbness and headaches. Psychiatric/Behavioral: Negative. Negative for dysphoric mood. The patient is not nervous/anxious. Objective:     Physical Exam  Vitals reviewed. Constitutional:       Appearance: Normal appearance. She is obese. HENT:      Head: Normocephalic and atraumatic. Nose: Nose normal.      Mouth/Throat:      Mouth: Mucous membranes are moist.      Pharynx: Oropharynx is clear. Eyes:      Extraocular Movements: Extraocular movements intact. Conjunctiva/sclera: Conjunctivae normal.      Pupils: Pupils are equal, round, and reactive to light. Cardiovascular:      Rate and Rhythm: Normal rate and regular rhythm. Heart sounds: Normal heart sounds. No murmur heard. No gallop. Pulmonary:      Effort: Pulmonary effort is normal. No respiratory distress. Breath sounds: Normal breath sounds. No stridor. No wheezing. Abdominal:      General: Bowel sounds are normal. There is no distension. Palpations: Abdomen is soft. Tenderness: There is no abdominal tenderness. There is no guarding or rebound. Musculoskeletal:         General: No swelling or tenderness. Normal range of motion. Cervical back: Normal range of motion and neck supple. Skin:     General: Skin is warm and dry. Coloration: Skin is not jaundiced. Findings: No rash. Neurological:      General: No focal deficit present. Mental Status: She is alert and oriented to person, place, and time. Psychiatric:         Mood and Affect: Mood normal.         Behavior: Behavior normal.         Thought Content:  Thought content normal.         Judgment: Judgment normal.     /81   Pulse (!) 102   Temp 97.1 °F (36.2 °C)   Ht 5' 2\" (1.575 m)   Wt 265 lb 12.8 oz (120.6 kg)   SpO2 97%   BMI 48.62 kg/m²     Assessment/Plan:   1. Essential hypertension  2. Current mild episode of major depressive disorder without prior episode ([facility]  3. Primary osteoarthritis of both knees      Hypertension-controlled, continue hydrochlorothiazide 25 mg daily    Depression-improving, no longer taking Wellbutrin    Bilateral knee osteoarthritis-following with orthopedics, has been losing weight to get bilateral knee surgery done soon      Return in about 3 months (around 3/13/2023) for Follow up HTN. No orders of the defined types were placed in this encounter. No orders of the defined types were placed in this encounter. Patient given educational materials - see patient instructions. Discussed use, benefit, and side effects of prescribed medications. All patientquestions answered. Pt voiced understanding. Reviewed health maintenance. Instructedto continue current medications, diet and exercise. Patient agreed with treatmentplan. Follow up as directed.      Electronically signed by Tammi Martinez MD on 12/13/2022 at 4:50 PM

## 2023-02-06 DIAGNOSIS — E55.9 VITAMIN D DEFICIENCY: ICD-10-CM

## 2023-02-07 RX ORDER — CHOLECALCIFEROL (VITAMIN D3) 125 MCG
1 CAPSULE ORAL DAILY
Qty: 30 TABLET | Refills: 5 | Status: SHIPPED | OUTPATIENT
Start: 2023-02-07

## 2023-02-07 NOTE — TELEPHONE ENCOUNTER
Robbie Moore is calling to request a refill on the following medication(s):    Medication Request:  Requested Prescriptions     Pending Prescriptions Disp Refills    Cholecalciferol (VITAMIN D3) 50 MCG (2000 UT) TABS 30 tablet 5       Last Visit Date (If Applicable):  79/83/2289    Next Visit Date:    3/14/2023

## 2023-03-14 ENCOUNTER — OFFICE VISIT (OUTPATIENT)
Dept: FAMILY MEDICINE CLINIC | Age: 73
End: 2023-03-14

## 2023-03-14 VITALS
OXYGEN SATURATION: 99 % | WEIGHT: 258 LBS | HEIGHT: 62 IN | BODY MASS INDEX: 47.48 KG/M2 | SYSTOLIC BLOOD PRESSURE: 130 MMHG | HEART RATE: 105 BPM | DIASTOLIC BLOOD PRESSURE: 82 MMHG | TEMPERATURE: 97.3 F

## 2023-03-14 DIAGNOSIS — N18.31 STAGE 3A CHRONIC KIDNEY DISEASE (HCC): ICD-10-CM

## 2023-03-14 DIAGNOSIS — F32.0 CURRENT MILD EPISODE OF MAJOR DEPRESSIVE DISORDER WITHOUT PRIOR EPISODE (HCC): ICD-10-CM

## 2023-03-14 DIAGNOSIS — I26.99 BILATERAL PULMONARY EMBOLISM (HCC): ICD-10-CM

## 2023-03-14 DIAGNOSIS — I10 ESSENTIAL HYPERTENSION: Primary | ICD-10-CM

## 2023-03-14 PROBLEM — R79.89 TROPONIN LEVEL ELEVATED: Status: RESOLVED | Noted: 2021-01-04 | Resolved: 2023-03-14

## 2023-03-14 PROBLEM — R77.8 TROPONIN LEVEL ELEVATED: Status: RESOLVED | Noted: 2021-01-04 | Resolved: 2023-03-14

## 2023-03-14 PROBLEM — I50.22 CHRONIC SYSTOLIC (CONGESTIVE) HEART FAILURE (HCC): Status: RESOLVED | Noted: 2022-06-07 | Resolved: 2023-03-14

## 2023-03-14 SDOH — ECONOMIC STABILITY: INCOME INSECURITY: HOW HARD IS IT FOR YOU TO PAY FOR THE VERY BASICS LIKE FOOD, HOUSING, MEDICAL CARE, AND HEATING?: NOT HARD AT ALL

## 2023-03-14 SDOH — ECONOMIC STABILITY: FOOD INSECURITY: WITHIN THE PAST 12 MONTHS, YOU WORRIED THAT YOUR FOOD WOULD RUN OUT BEFORE YOU GOT MONEY TO BUY MORE.: NEVER TRUE

## 2023-03-14 SDOH — ECONOMIC STABILITY: FOOD INSECURITY: WITHIN THE PAST 12 MONTHS, THE FOOD YOU BOUGHT JUST DIDN'T LAST AND YOU DIDN'T HAVE MONEY TO GET MORE.: NEVER TRUE

## 2023-03-14 SDOH — ECONOMIC STABILITY: HOUSING INSECURITY
IN THE LAST 12 MONTHS, WAS THERE A TIME WHEN YOU DID NOT HAVE A STEADY PLACE TO SLEEP OR SLEPT IN A SHELTER (INCLUDING NOW)?: NO

## 2023-03-14 ASSESSMENT — PATIENT HEALTH QUESTIONNAIRE - PHQ9
SUM OF ALL RESPONSES TO PHQ QUESTIONS 1-9: 0
4. FEELING TIRED OR HAVING LITTLE ENERGY: 0
7. TROUBLE CONCENTRATING ON THINGS, SUCH AS READING THE NEWSPAPER OR WATCHING TELEVISION: 0
SUM OF ALL RESPONSES TO PHQ QUESTIONS 1-9: 0
SUM OF ALL RESPONSES TO PHQ9 QUESTIONS 1 & 2: 0
3. TROUBLE FALLING OR STAYING ASLEEP: 0
SUM OF ALL RESPONSES TO PHQ QUESTIONS 1-9: 0
SUM OF ALL RESPONSES TO PHQ QUESTIONS 1-9: 0
10. IF YOU CHECKED OFF ANY PROBLEMS, HOW DIFFICULT HAVE THESE PROBLEMS MADE IT FOR YOU TO DO YOUR WORK, TAKE CARE OF THINGS AT HOME, OR GET ALONG WITH OTHER PEOPLE: 0
9. THOUGHTS THAT YOU WOULD BE BETTER OFF DEAD, OR OF HURTING YOURSELF: 0
5. POOR APPETITE OR OVEREATING: 0
6. FEELING BAD ABOUT YOURSELF - OR THAT YOU ARE A FAILURE OR HAVE LET YOURSELF OR YOUR FAMILY DOWN: 0
8. MOVING OR SPEAKING SO SLOWLY THAT OTHER PEOPLE COULD HAVE NOTICED. OR THE OPPOSITE, BEING SO FIGETY OR RESTLESS THAT YOU HAVE BEEN MOVING AROUND A LOT MORE THAN USUAL: 0
1. LITTLE INTEREST OR PLEASURE IN DOING THINGS: 0
2. FEELING DOWN, DEPRESSED OR HOPELESS: 0

## 2023-03-14 ASSESSMENT — ENCOUNTER SYMPTOMS
SHORTNESS OF BREATH: 0
COUGH: 0
SORE THROAT: 0
EYE DISCHARGE: 0
WHEEZING: 0
CHEST TIGHTNESS: 0
CONSTIPATION: 0
DIARRHEA: 0
VOMITING: 0
NAUSEA: 0
ABDOMINAL PAIN: 0

## 2023-03-14 NOTE — PROGRESS NOTES
[facility]  [facility] [place] PRIMARY [facility]ARE  [address] [place] [address]  [address]  [facility] [address]  Dept: [phone]  Dept Fax: [phone]    Maureen Dupree is a 72 y.o. female who is a Established patient, who presents today for her medical conditions/complaints as noted below:  Chief Complaint   Patient presents with    Hypertension    Medication Check         HPI:     She is here today to follow-up on hypertension.  She has been doing well on hydrochlorothiazide and denies any issues.  She has been exercising regularly since she got steroid shots in her knee and states that she has been losing weight.  She states that she got a stationary bike that she has been using at home.  States that her mood and anxiety have been stable and she has not needed any medications.  She plans to be more physically active in summer months when it is warmer outside.  Denies any other issues today.      Hemoglobin A1C (%)   Date Value   09/09/2022 6.1 (H)   03/09/2022 6.3 (H)             ( goal A1Cis < 7)   No results found for: LABMICR  LDL Cholesterol (mg/dL)   Date Value   09/09/2022 98   03/09/2022 128   01/04/2021 68       (goal LDL is <100)   AST (U/L)   Date Value   09/09/2022 21     ALT (U/L)   Date Value   09/09/2022 13     BUN (mg/dL)   Date Value   09/09/2022 14     BP Readings from Last 3 Encounters:   03/14/23 130/82   12/13/22 139/81   09/13/22 125/73          (goal 120/80)    Past Medical History:   Diagnosis Date    Acute pulmonary embolism (HCC)     COPD (chronic obstructive pulmonary disease) (HCC)     Disease of blood and blood forming organ     Elevated blood pressure readings 1/4/2021    Hypertension       Past Surgical History:   Procedure Laterality Date    KNEE ARTHROSCOPY         Family History   Problem Relation Age of Onset    Cancer Mother     Kidney Disease Father        Social History     Tobacco Use    Smoking status: Never    Smokeless tobacco: Never   Substance Use Topics    Alcohol use: Never     Current Outpatient Medications   Medication Sig Dispense Refill    Multiple Vitamins-Minerals (OCUVITE EYE HEATLH GUMMIES PO) Take by mouth      Cholecalciferol (VITAMIN D3) 50 MCG (2000 UT) TABS Take 1 tablet by mouth daily 30 tablet 5    hydroCHLOROthiazide (HYDRODIURIL) 25 MG tablet TAKE ONE TABLET BY MOUTH EVERY MORNING 90 tablet 1    aspirin (ASPIRIN ADULT LOW STRENGTH) 81 MG EC tablet Take 1 tablet by mouth daily 90 tablet 1    Blood Pressure KIT Use to check BP twice a day 1 kit 0     No current facility-administered medications for this visit. No Known Allergies    Health Maintenance   Topic Date Due    COVID-19 Vaccine (1) Never done    DTaP/Tdap/Td vaccine (1 - Tdap) 09/13/2023 (Originally 5/25/1969)    Flu vaccine (1) 09/13/2023 (Originally 8/1/2022)    Shingles vaccine (1 of 2) 09/13/2023 (Originally 5/25/1998)    A1C test (Diabetic or Prediabetic)  09/09/2023    GFR test (Diabetes, CKD 3-4, OR last GFR 15-59)  09/09/2023    Annual Wellness Visit (AWV)  09/14/2023    Depression Monitoring  12/13/2023    Colorectal Cancer Screen  05/04/2024    Breast cancer screen  12/12/2024    Lipids  09/09/2027    DEXA (modify frequency per FRAX score)  Completed    Pneumococcal 65+ years Vaccine  Completed    Hepatitis C screen  Completed    Hepatitis A vaccine  Aged Out    Hib vaccine  Aged Out    Meningococcal (ACWY) vaccine  Aged Out       Subjective:     Review of Systems   Constitutional:  Negative for appetite change, fatigue and fever. HENT:  Negative for congestion, ear pain, hearing loss and sore throat. Eyes:  Negative for discharge and visual disturbance. Respiratory:  Negative for cough, chest tightness, shortness of breath and wheezing. Cardiovascular:  Negative for chest pain, palpitations and leg swelling. Gastrointestinal:  Negative for abdominal pain, constipation, diarrhea, nausea and vomiting. Genitourinary:  Negative for flank pain, frequency, hematuria and urgency. Musculoskeletal:  Negative for arthralgias, gait problem, joint swelling and myalgias. Skin: Negative. Neurological:  Negative for dizziness, weakness, numbness and headaches. Psychiatric/Behavioral: Negative. Negative for dysphoric mood. The patient is not nervous/anxious. Objective:     Physical Exam  Vitals reviewed. Constitutional:       Appearance: Normal appearance. She is obese. HENT:      Head: Normocephalic and atraumatic. Nose: Nose normal.      Mouth/Throat:      Mouth: Mucous membranes are moist.      Pharynx: Oropharynx is clear. Eyes:      Extraocular Movements: Extraocular movements intact. Conjunctiva/sclera: Conjunctivae normal.      Pupils: Pupils are equal, round, and reactive to light. Cardiovascular:      Rate and Rhythm: Normal rate and regular rhythm. Heart sounds: Normal heart sounds. No murmur heard. No gallop. Pulmonary:      Effort: Pulmonary effort is normal. No respiratory distress. Breath sounds: Normal breath sounds. No stridor. No wheezing. Abdominal:      General: Bowel sounds are normal. There is no distension. Palpations: Abdomen is soft. Tenderness: There is no abdominal tenderness. There is no guarding or rebound. Musculoskeletal:         General: No swelling or tenderness. Normal range of motion. Cervical back: Normal range of motion and neck supple. Skin:     General: Skin is warm and dry. Coloration: Skin is not jaundiced. Findings: No rash. Neurological:      General: No focal deficit present. Mental Status: She is alert and oriented to person, place, and time. Psychiatric:         Mood and Affect: Mood normal.         Behavior: Behavior normal.         Thought Content: Thought content normal.         Judgment: Judgment normal.     /82   Pulse (!) 105   Temp 97.3 °F (36.3 °C)   Ht 5' 2\" (1.575 m)   Wt 258 lb (117 kg)   SpO2 99%   BMI 47.19 kg/m²     Assessment/Plan:   1. Essential hypertension  2. Stage 3a chronic kidney disease (HCC)  3. Current mild episode of major depressive disorder without prior episode ([facility] [address]  4. Bilateral pulmonary embolism ([facility] [address]  5. Body mass index (BMI) 45.0-49.9, adult ([facility])      Hypertension-controlled, on hydrochlorothiazide 25 mg daily    Stage 3 CKD- stable    Depression- improving, no longer on medications    Hx of Bilateral PE-provoked PE, no longer on Xarelto    Obesity- exercising regularly, has lost 10 lbs since last visit      Return in about 3 months (around 6/14/2023) for after 9/13, Follow up HTN. No orders of the defined types were placed in this encounter. No orders of the defined types were placed in this encounter. Patient given educational materials - see patient instructions. Discussed use, benefit, and side effects of prescribed medications. All patientquestions answered. Pt voiced understanding. Reviewed health maintenance. Instructedto continue current medications, diet and exercise. Patient agreed with treatmentplan. Follow up as directed.      Electronically signed by Connie Wells MD on 3/14/2023 at 5:06 PM

## 2023-03-15 ENCOUNTER — HOSPITAL ENCOUNTER (INPATIENT)
Age: 73
LOS: 1 days | Discharge: HOME | End: 2023-03-17
Attending: EMERGENCY MEDICINE | Admitting: INTERNAL MEDICINE
Payer: MEDICARE

## 2023-03-15 ENCOUNTER — APPOINTMENT (OUTPATIENT)
Dept: CT IMAGING | Age: 73
End: 2023-03-15
Payer: MEDICARE

## 2023-03-15 DIAGNOSIS — I26.09 ACUTE PULMONARY EMBOLISM WITH ACUTE COR PULMONALE, UNSPECIFIED PULMONARY EMBOLISM TYPE (HCC): Primary | ICD-10-CM

## 2023-03-15 PROBLEM — E87.6 HYPOKALEMIA: Status: ACTIVE | Noted: 2023-03-15

## 2023-03-15 PROBLEM — E87.5 HYPERKALEMIA: Status: RESOLVED | Noted: 2021-02-04 | Resolved: 2023-03-15

## 2023-03-15 LAB
ABSOLUTE EOS #: 0.04 K/UL (ref 0–0.44)
ABSOLUTE IMMATURE GRANULOCYTE: 0.1 K/UL (ref 0–0.3)
ABSOLUTE LYMPH #: 1.63 K/UL (ref 1.1–3.7)
ABSOLUTE MONO #: 0.82 K/UL (ref 0.1–1.2)
ANION GAP SERPL CALCULATED.3IONS-SCNC: 14 MMOL/L (ref 9–17)
BASOPHILS # BLD: 1 % (ref 0–2)
BASOPHILS ABSOLUTE: 0.04 K/UL (ref 0–0.2)
BUN SERPL-MCNC: 18 MG/DL (ref 8–23)
BUN/CREAT BLD: 18 (ref 9–20)
CALCIUM SERPL-MCNC: 9.3 MG/DL (ref 8.6–10.4)
CHLORIDE SERPL-SCNC: 100 MMOL/L (ref 98–107)
CO2 SERPL-SCNC: 23 MMOL/L (ref 20–31)
CREAT SERPL-MCNC: 1 MG/DL (ref 0.5–0.9)
EOSINOPHILS RELATIVE PERCENT: 1 % (ref 1–4)
GFR SERPL CREATININE-BSD FRML MDRD: 59 ML/MIN/1.73M2
GLUCOSE SERPL-MCNC: 105 MG/DL (ref 70–99)
HCT VFR BLD AUTO: 36.7 % (ref 36.3–47.1)
HGB BLD-MCNC: 12 G/DL (ref 11.9–15.1)
IMMATURE GRANULOCYTES: 1 %
INR PPP: 1
LYMPHOCYTES # BLD: 21 % (ref 24–43)
MCH RBC QN AUTO: 31.3 PG (ref 25.2–33.5)
MCHC RBC AUTO-ENTMCNC: 32.7 G/DL (ref 28.4–34.8)
MCV RBC AUTO: 95.8 FL (ref 82.6–102.9)
MONOCYTES # BLD: 10 % (ref 3–12)
NRBC AUTOMATED: 0 PER 100 WBC
PARTIAL THROMBOPLASTIN TIME: 29.9 SEC (ref 23.9–33.8)
PDW BLD-RTO: 13.4 % (ref 11.8–14.4)
PLATELET # BLD AUTO: 240 K/UL (ref 138–453)
PMV BLD AUTO: 10.7 FL (ref 8.1–13.5)
POTASSIUM SERPL-SCNC: 3.4 MMOL/L (ref 3.7–5.3)
PROTHROMBIN TIME: 13.5 SEC (ref 11.5–14.2)
RBC # BLD: 3.83 M/UL (ref 3.95–5.11)
SEG NEUTROPHILS: 66 % (ref 36–65)
SEGMENTED NEUTROPHILS ABSOLUTE COUNT: 5.23 K/UL (ref 1.5–8.1)
SODIUM SERPL-SCNC: 137 MMOL/L (ref 135–144)
TROPONIN I SERPL DL<=0.01 NG/ML-MCNC: 14 NG/L (ref 0–14)
WBC # BLD AUTO: 7.9 K/UL (ref 3.5–11.3)

## 2023-03-15 PROCEDURE — 93005 ELECTROCARDIOGRAM TRACING: CPT | Performed by: EMERGENCY MEDICINE

## 2023-03-15 PROCEDURE — 85730 THROMBOPLASTIN TIME PARTIAL: CPT

## 2023-03-15 PROCEDURE — 80048 BASIC METABOLIC PNL TOTAL CA: CPT

## 2023-03-15 PROCEDURE — 71260 CT THORAX DX C+: CPT | Performed by: EMERGENCY MEDICINE

## 2023-03-15 PROCEDURE — 85610 PROTHROMBIN TIME: CPT

## 2023-03-15 PROCEDURE — 6360000004 HC RX CONTRAST MEDICATION: Performed by: EMERGENCY MEDICINE

## 2023-03-15 PROCEDURE — 85025 COMPLETE CBC W/AUTO DIFF WBC: CPT

## 2023-03-15 PROCEDURE — 99223 1ST HOSP IP/OBS HIGH 75: CPT | Performed by: NURSE PRACTITIONER

## 2023-03-15 PROCEDURE — 96374 THER/PROPH/DIAG INJ IV PUSH: CPT

## 2023-03-15 PROCEDURE — 6360000002 HC RX W HCPCS: Performed by: EMERGENCY MEDICINE

## 2023-03-15 PROCEDURE — 2580000003 HC RX 258: Performed by: EMERGENCY MEDICINE

## 2023-03-15 PROCEDURE — 36415 COLL VENOUS BLD VENIPUNCTURE: CPT

## 2023-03-15 PROCEDURE — 99285 EMERGENCY DEPT VISIT HI MDM: CPT

## 2023-03-15 PROCEDURE — 84484 ASSAY OF TROPONIN QUANT: CPT

## 2023-03-15 RX ORDER — HEPARIN SODIUM 1000 [USP'U]/ML
40 INJECTION, SOLUTION INTRAVENOUS; SUBCUTANEOUS PRN
Status: DISCONTINUED | OUTPATIENT
Start: 2023-03-15 | End: 2023-03-17 | Stop reason: ALTCHOICE

## 2023-03-15 RX ORDER — HEPARIN SODIUM 10000 [USP'U]/100ML
5-30 INJECTION, SOLUTION INTRAVENOUS CONTINUOUS
Status: DISCONTINUED | OUTPATIENT
Start: 2023-03-15 | End: 2023-03-17

## 2023-03-15 RX ORDER — HEPARIN SODIUM 1000 [USP'U]/ML
80 INJECTION, SOLUTION INTRAVENOUS; SUBCUTANEOUS PRN
Status: DISCONTINUED | OUTPATIENT
Start: 2023-03-15 | End: 2023-03-17 | Stop reason: ALTCHOICE

## 2023-03-15 RX ORDER — 0.9 % SODIUM CHLORIDE 0.9 %
80 INTRAVENOUS SOLUTION INTRAVENOUS ONCE
Status: DISCONTINUED | OUTPATIENT
Start: 2023-03-15 | End: 2023-03-17 | Stop reason: HOSPADM

## 2023-03-15 RX ORDER — SODIUM CHLORIDE 0.9 % (FLUSH) 0.9 %
10 SYRINGE (ML) INJECTION PRN
Status: DISCONTINUED | OUTPATIENT
Start: 2023-03-15 | End: 2023-03-17 | Stop reason: HOSPADM

## 2023-03-15 RX ORDER — HEPARIN SODIUM 1000 [USP'U]/ML
80 INJECTION, SOLUTION INTRAVENOUS; SUBCUTANEOUS ONCE
Status: COMPLETED | OUTPATIENT
Start: 2023-03-15 | End: 2023-03-15

## 2023-03-15 RX ADMIN — SODIUM CHLORIDE, PRESERVATIVE FREE 10 ML: 5 INJECTION INTRAVENOUS at 21:34

## 2023-03-15 RX ADMIN — IOPAMIDOL 100 ML: 755 INJECTION, SOLUTION INTRAVENOUS at 21:34

## 2023-03-15 RX ADMIN — HEPARIN SODIUM AND DEXTROSE 17 UNITS/KG/HR: 10000; 5 INJECTION INTRAVENOUS at 23:10

## 2023-03-15 RX ADMIN — HEPARIN SODIUM 9360 UNITS: 1000 INJECTION INTRAVENOUS; SUBCUTANEOUS at 23:04

## 2023-03-15 ASSESSMENT — ENCOUNTER SYMPTOMS: SHORTNESS OF BREATH: 1

## 2023-03-16 PROBLEM — E27.8 ADRENAL NODULE (HCC): Status: ACTIVE | Noted: 2023-03-16

## 2023-03-16 PROBLEM — K80.20 CHOLELITHIASIS: Status: ACTIVE | Noted: 2023-03-16

## 2023-03-16 PROBLEM — I26.99 ACUTE MASSIVE PULMONARY EMBOLISM (HCC): Status: ACTIVE | Noted: 2023-03-16

## 2023-03-16 PROBLEM — E27.9 ADRENAL NODULE (HCC): Status: ACTIVE | Noted: 2023-03-16

## 2023-03-16 LAB
ANTI-XA UNFRAC HEPARIN: 1.1 IU/L (ref 0.3–0.7)
ANTI-XA UNFRAC HEPARIN: 1.1 IU/L (ref 0.3–0.7)
ANTI-XA UNFRAC HEPARIN: >1.1 IU/L (ref 0.3–0.7)
CHOLEST SERPL-MCNC: 176 MG/DL
CHOLESTEROL/HDL RATIO: 3.1
EKG ATRIAL RATE: 106 BPM
EKG P AXIS: 42 DEGREES
EKG P-R INTERVAL: 152 MS
EKG Q-T INTERVAL: 338 MS
EKG QRS DURATION: 86 MS
EKG QTC CALCULATION (BAZETT): 448 MS
EKG R AXIS: 12 DEGREES
EKG T AXIS: 49 DEGREES
EKG VENTRICULAR RATE: 106 BPM
HDLC SERPL-MCNC: 56 MG/DL
LDLC SERPL CALC-MCNC: 106 MG/DL (ref 0–130)
LV EF: 65 %
LVEF MODALITY: NORMAL
TRIGL SERPL-MCNC: 71 MG/DL

## 2023-03-16 PROCEDURE — 2060000000 HC ICU INTERMEDIATE R&B

## 2023-03-16 PROCEDURE — 93010 ELECTROCARDIOGRAM REPORT: CPT | Performed by: INTERNAL MEDICINE

## 2023-03-16 PROCEDURE — 80061 LIPID PANEL: CPT

## 2023-03-16 PROCEDURE — 36415 COLL VENOUS BLD VENIPUNCTURE: CPT

## 2023-03-16 PROCEDURE — 93970 EXTREMITY STUDY: CPT

## 2023-03-16 PROCEDURE — 6360000002 HC RX W HCPCS: Performed by: NURSE PRACTITIONER

## 2023-03-16 PROCEDURE — 6370000000 HC RX 637 (ALT 250 FOR IP): Performed by: NURSE PRACTITIONER

## 2023-03-16 PROCEDURE — 85520 HEPARIN ASSAY: CPT

## 2023-03-16 PROCEDURE — 99232 SBSQ HOSP IP/OBS MODERATE 35: CPT | Performed by: INTERNAL MEDICINE

## 2023-03-16 PROCEDURE — 93306 TTE W/DOPPLER COMPLETE: CPT

## 2023-03-16 RX ORDER — SODIUM CHLORIDE 0.9 % (FLUSH) 0.9 %
10 SYRINGE (ML) INJECTION PRN
Status: DISCONTINUED | OUTPATIENT
Start: 2023-03-16 | End: 2023-03-17 | Stop reason: HOSPADM

## 2023-03-16 RX ORDER — MAGNESIUM SULFATE 1 G/100ML
1000 INJECTION INTRAVENOUS PRN
Status: DISCONTINUED | OUTPATIENT
Start: 2023-03-16 | End: 2023-03-17 | Stop reason: HOSPADM

## 2023-03-16 RX ORDER — SODIUM CHLORIDE 9 MG/ML
INJECTION, SOLUTION INTRAVENOUS PRN
Status: DISCONTINUED | OUTPATIENT
Start: 2023-03-16 | End: 2023-03-17 | Stop reason: HOSPADM

## 2023-03-16 RX ORDER — ACETAMINOPHEN 650 MG/1
650 SUPPOSITORY RECTAL EVERY 6 HOURS PRN
Status: DISCONTINUED | OUTPATIENT
Start: 2023-03-16 | End: 2023-03-17 | Stop reason: HOSPADM

## 2023-03-16 RX ORDER — ASPIRIN 81 MG/1
81 TABLET ORAL DAILY
Status: DISCONTINUED | OUTPATIENT
Start: 2023-03-16 | End: 2023-03-17 | Stop reason: HOSPADM

## 2023-03-16 RX ORDER — SODIUM CHLORIDE 0.9 % (FLUSH) 0.9 %
5-40 SYRINGE (ML) INJECTION EVERY 12 HOURS SCHEDULED
Status: DISCONTINUED | OUTPATIENT
Start: 2023-03-16 | End: 2023-03-17 | Stop reason: HOSPADM

## 2023-03-16 RX ORDER — ONDANSETRON 4 MG/1
4 TABLET, ORALLY DISINTEGRATING ORAL EVERY 8 HOURS PRN
Status: DISCONTINUED | OUTPATIENT
Start: 2023-03-16 | End: 2023-03-17 | Stop reason: HOSPADM

## 2023-03-16 RX ORDER — ONDANSETRON 2 MG/ML
4 INJECTION INTRAMUSCULAR; INTRAVENOUS EVERY 6 HOURS PRN
Status: DISCONTINUED | OUTPATIENT
Start: 2023-03-16 | End: 2023-03-17 | Stop reason: HOSPADM

## 2023-03-16 RX ORDER — POTASSIUM CHLORIDE 7.45 MG/ML
10 INJECTION INTRAVENOUS PRN
Status: DISCONTINUED | OUTPATIENT
Start: 2023-03-16 | End: 2023-03-17 | Stop reason: HOSPADM

## 2023-03-16 RX ORDER — POTASSIUM CHLORIDE 20 MEQ/1
40 TABLET, EXTENDED RELEASE ORAL PRN
Status: DISCONTINUED | OUTPATIENT
Start: 2023-03-16 | End: 2023-03-17 | Stop reason: HOSPADM

## 2023-03-16 RX ORDER — POLYETHYLENE GLYCOL 3350 17 G/17G
17 POWDER, FOR SOLUTION ORAL DAILY PRN
Status: DISCONTINUED | OUTPATIENT
Start: 2023-03-16 | End: 2023-03-17 | Stop reason: HOSPADM

## 2023-03-16 RX ORDER — ACETAMINOPHEN 325 MG/1
650 TABLET ORAL EVERY 6 HOURS PRN
Status: DISCONTINUED | OUTPATIENT
Start: 2023-03-16 | End: 2023-03-17 | Stop reason: HOSPADM

## 2023-03-16 RX ADMIN — POTASSIUM CHLORIDE 40 MEQ: 1500 TABLET, EXTENDED RELEASE ORAL at 03:50

## 2023-03-16 RX ADMIN — ASPIRIN 81 MG: 81 TABLET, COATED ORAL at 09:17

## 2023-03-16 RX ADMIN — HEPARIN SODIUM AND DEXTROSE 11 UNITS/KG/HR: 10000; 5 INJECTION INTRAVENOUS at 13:52

## 2023-03-16 ASSESSMENT — PAIN SCALES - GENERAL: PAINLEVEL_OUTOF10: 0

## 2023-03-16 ASSESSMENT — ENCOUNTER SYMPTOMS
CHEST TIGHTNESS: 1
COUGH: 0
VOMITING: 0
ABDOMINAL PAIN: 0
SHORTNESS OF BREATH: 1
NAUSEA: 0

## 2023-03-16 NOTE — PROGRESS NOTES
K was 3.4 according to the BMP at 9PM yesterday and was not replaced.  Orders to replace K now per Rubio Cid NP.

## 2023-03-16 NOTE — ED NOTES
ED to inpatient nurses report     Chief Complaint   Patient presents with    Shortness of Breath     X 1 week. Pts pcp sent her to ed for concern for blood clots in lungs. Pt states she had blood clots 2 years ago. Pt states she is no longer on blood thinners      Present to ED from home for evaluation of shortness of breath that has been ongoing for the past week. Patient with hx of admission due to blood clots 1/3/2021. Was on blood thinners after discharge, but not currently on blood thinners. LOC: alert and orientated to name, place, date  Vital signs   Vitals:    03/15/23 2100 03/15/23 2125 03/15/23 2200 03/15/23 2230   BP:       Pulse: 85 86 99 96   Resp: 21 22 24 26   Temp:       TempSrc:       SpO2: 94% 95% 96% 95%   Weight:       Height:          Oxygen Baseline room air.      Current needs required 2L O2       LDAs:   Peripheral IV 03/15/23 Right Antecubital (Active)   Site Assessment Clean, dry & intact 03/15/23 2040   Line Status Blood return noted;Brisk blood return 03/15/23 2040   Phlebitis Assessment No symptoms 03/15/23 2040   Infiltration Assessment 0 03/15/23 2040   Dressing Status New dressing applied 03/15/23 2040     Mobility: Requires assistance * 1  Fall Risk:    Pending ED orders: n/a  Present condition: stable  Code Status: FULL   Consults: IP CONSULT TO VASCULAR SURGERY  IP CONSULT TO PULMONOLOGY  IP CONSULT TO INTERNAL MEDICINE  [x]  Hospitalist   Completed  [x] yes [] no Who: Intermed  []  Medicine  Completed  [] yes [] No Who:   []  Cardiology  Completed  [] yes [] No Who:   []  GI   Completed  [] yes [] No Who:   []  Neurology  Completed  [] yes [] No Who:   []  Nephrology Completed  [] yes [] No Who:    [x]  Vascular  Completed  [x] yes [] No Who: Dr Cha Kilgore  []  Ortho  Completed  [] yes [] No Who:     []  Surgery  Completed  [] yes [] No Who:    []  Urology  Completed  [] yes [] No Who:    []  CT Surgery Completed  [] yes [] No Who:   []  Podiatry  Completed  [] yes [] No Who: [x]  Other    Completed  [x] yes [] No Who: Dr Thania Rodriguez (pulmonary)  Interventions: CTA done:   Massive burden of pulmonary embolism with pulmonary emboli noted from main   pulmonary artery to segmental and subsegmental branches with associated   right-sided cardiac strain. Important Events:  Heparin gtt started 2300- going at 17 units/kg/hr. Given Heparin 9360 unit bolus.         Electronically signed by Dragan Ratliff RN on 3/15/2023 at 11:47 PM     Dragan Ratliff RN  03/15/23 [address], RN  03/15/23 9006

## 2023-03-16 NOTE — CONSULTS
VASCULAR SURGERY   CONSULT        Name: Moni Shepherd  MRN: [id number]     [place]: [de-identified]  Room: 2032/2032-01    Admit Date: 3/15/2023  PCP: Willian Long MD    Physician Requesting Consult:  Dr. Nu Ly    Reason for Consult: Acute bilateral pulmonary embolus    Chief Complaint:     Chief Complaint   Patient presents with    Shortness of Breath     X 1 week. Pts pcp sent her to ed for concern for blood clots in lungs. Pt states she had blood clots 2 years ago. Pt states she is no longer on blood thinners         History Obtained From:     patient, electronic medical record and nursing    History of Present Illness:      Moni Shepherd is a  74 y.o.  female who presents with Shortness of Breath (X 1 week. Pts pcp sent her to ed for concern for blood clots in lungs. Pt states she had blood clots 2 years ago. Pt states she is no longer on blood thinners)  Currently admitted with acute bilateral pulmonary emboli. On review of the CTA there appears to be significant mount of thrombus in the on the right with less being present on the left. CT initially suggested heart strain however cardiac echo was performed which does not demonstrate any evidence of significant heart strain. Currently she is comfortable on room air with an SaO2 of 98%. She states that she did have previous pulmonary embolus several years ago and was treated for 6 months with Xarelto. He denies any familial hypercoagulability. Personal pulmonary embolus was associated with travel however she denies any recent travel for her current PE. He denies amaurosis fugax, lateralizing symptoms, claudication or family history of aortic aneurysm.   She does have mild swelling in both lower extremities however venous duplex pending    Past Medical History:     Past Medical History:   Diagnosis Date    Acute pulmonary embolism (HCC)     COPD (chronic obstructive pulmonary disease) ([facility] [address]     Disease of blood and blood forming organ Elevated blood pressure readings 1/4/2021    Hypertension     Troponin level elevated 1/4/2021        Past Surgical History:     Past Surgical History:   Procedure Laterality Date    KNEE ARTHROSCOPY          Medications Prior to Admission:       Prior to Admission medications    Medication Sig Start Date End Date Taking? Authorizing Provider   Multiple Vitamins-Minerals (OCUVITE EYE HEATLH GUMMIES PO) Take by mouth    Historical Provider, MD   Cholecalciferol (VITAMIN D3) 50 MCG (2000 UT) TABS Take 1 tablet by mouth daily 2/7/23   Lonny Seals MD   hydroCHLOROthiazide (HYDRODIURIL) 25 MG tablet TAKE ONE TABLET BY MOUTH EVERY MORNING 11/7/22   Lonny Seals MD   aspirin (ASPIRIN ADULT LOW STRENGTH) 81 MG EC tablet Take 1 tablet by mouth daily 7/5/22   Lonny Seals MD   Blood Pressure KIT Use to check BP twice a day 1/21/21   Lonny Seals MD        Allergies:       Patient has no known allergies. Social History:     Tobacco:    reports that she has never smoked. She has never used smokeless tobacco.  Alcohol:      reports current alcohol use. Drug Use:  reports no history of drug use.     Family History:     Family History   Problem Relation Age of Onset    Cancer Mother     Kidney Disease Father        Review of Systems:     Positive and Negative as described in HPI    Constitutional:  negative for  fevers, chills, sweats, fatigue, and weight loss  HEENT:  negative for vision or hearing changes,   Respiratory:  negative for shortness of breath, cough, or congestion  Cardiovascular:  negative for  chest pain, palpitations  Gastrointestinal:  negative for nausea, vomiting, diarrhea, constipation, abdominal pain  Genitourinary:  negative for frequency, dysuria  Integument/Breast:  negative for rash, skin lesions  Musculoskeletal:  negative for muscle aches or joint pain  Neurological:  negative for headaches, dizziness, lightheadedness, numbness, pain and tingling extremities  Behavior/Psych:  negative for depression and anxiety    Code Status:  Full Code    Physical Exam:     Vitals:  BP (!) 161/88   Pulse 81   Temp 98 °F (36.7 °C) (Temporal)   Resp 26   Ht 5' 2\" (1.575 m)   Wt 255 lb 3.2 oz (115.8 kg)   SpO2 95%   BMI 46.68 kg/m²   Temp (24hrs), Av.9 °F (36.6 °C), Min:97.3 °F (36.3 °C), Max:98.7 °F (37.1 °C)      General appearance - alert, well appearing and in no acute distress  Mental status - oriented to person, place and time with normal affect  Head - normocephalic and atraumatic  Eyes - pupils equal and reactive, extraocular eye movements intact, conjunctiva clear  Ears - hearing appears to be intact  Nose - no drainage noted  Mouth - mucous membranes moist  Neck - supple, no carotid bruits, thyroid not palpable, no JVD  Chest - clear to auscultation, normal effort  Heart - normal rate, regular rhythm, no murmurs  Abdomen - soft, obese, non-tender, non-distended, bowel sounds present all four quadrants, no masses, hepatomegaly, splenomegaly or aortic enlargement  Neurological - normal speech, no focal findings or movement disorder noted, cranial nerves II through XII grossly intact  Extremities - peripheral pulses palpable, mild bilateral lower extremity edema  Skin - no gross lesions, rashes, or induration noted      Data:     Hematology:  Recent Labs     03/15/23  2040   WBC 7.9   RBC 3.83*   HGB 12.0   HCT 36.7   MCV 95.8   MCH 31.3   MCHC 32.7   RDW 13.4      MPV 10.7   SEGS 66*   LYMPHOPCT 21*   MONOPCT 10   EOSRELPCT 1   BASOPCT 1   PROTIME 13.5   APTT 29.9   INR 1.0     Chemistry:  Recent Labs     03/15/23  2040      K 3.4*      CO2 23   GLUCOSE 105*   BUN 18   CREATININE 1.00*   ANIONGAP 14   LABGLOM 59*   CALCIUM 9.3   TROPHS 14     Recent Labs     23  0436   CHOL 176   HDL 56   LDLCHOLESTEROL 106   CHOLHDLRATIO 3.1   TRIG 71     Glucose:No results for input(s): LABA1C, POCGLU, LABINSU in the last 72 hours.      Assessment:     Primary Problem  Acute massive  pulmonary embolism [facility])  20-year-old female with acute bilateral pulmonary emboli (second episode, unprovoked)  No evidence of right heart strain by echo    Active Hospital Problems    Diagnosis Date Noted    Acute massive pulmonary embolism ([address] [I26.99] 03/16/2023     Priority: Medium    Hypokalemia [E87.6] 03/15/2023     Priority: Medium    Chronic renal disease, stage III [facility]) [859528] [N18.30] 06/07/2022     Priority: Medium    Essential hypertension [I10] 03/04/2021    Anemia, normocytic normochromic [D64.9] 01/04/2021    Obesity due to excess calories without serious comorbidity [E66.09]        Plan:     I discussed the above with her and at present I do not feel she needs undergo EKOS there is no evidence of significant heart strain  Continue high intensity IV heparin  Transition to Eliquis tomorrow  Await venous duplex  Fit with 20 to 30 mmHg knee-high Jobst stockings      Electronically signed by Alray Gilford, MD on 3/16/2023 at 1:40 PM     Copy sent to Dr. Emelia Reyes MD

## 2023-03-16 NOTE — CARE COORDINATION
03/16/23 1145   Service Assessment   Patient Orientation Alert and Oriented;Person;Place;Situation;Self   Cognition Alert   History Provided By Patient   Primary [address]   Patient's Healthcare Decision Maker is: Legal Next of Kin   PCP Verified by CM Yes   Last Visit to PCP Within last 3 months   Prior Functional Level Independent in ADLs/IADLs   Current Functional Level Independent in ADLs/IADLs   Can patient return to prior living arrangement Unknown at present   Ability to make needs known: Good   Family able to assist with home care needs: No   Would you like for me to discuss the discharge plan with any other family members/significant others, and if so, who? No   Financial Resources None   Community Resources None   Social/Functional History   Lives With Alone   Type of Home Mobile home   Home Layout One level   Home Access Stairs to enter with rails   Entrance Stairs - Number of Steps 3 steps   Entrance Stairs - Rails Both   Bathroom Shower/Tub Tub/Shower unit   Bathroom Toilet Standard   Bathroom Equipment None   Bathroom Accessibility Accessible   Home Equipment None   Receives Help From Family   Homemaking Assistance Independent   Homemaking Responsibilities Yes   Ambulation Assistance Independent   Transfer Assistance Independent   Active  Yes   Mode of Transportation Car   Occupation Unemployed   Discharge Planning   Type of Residence Trailer/Mobile [address] [place] [address] Prior To Admission None   Potential Assistance Needed N/A   DME Ordered? No   Type of Home Care Services None   Patient expects to be discharged to: Unknown   Follow Up Appointment: Best Day/Time  Monday AM   One/Two Story Residence One story   History of falls? 0   Services At/After Discharge   Transition of Care Consult (CM Consult) N/A   Services At/After Discharge None   [facility] Information Provided?  No   Mode of Transport at Discharge Other (see comment)  (daughter)   Condition of Participation: Discharge Planning   The Plan for Transition of Care is related to the following treatment goals: plan is home independently. PT/OT to eval when appropriate. The Patient and/or Patient Representative was provided with a Choice of Provider? Patient   The Patient and/Or Patient Representative agree with the Discharge Plan? Yes   Freedom of Choice list was provided with basic dialogue that supports the patient's individualized plan of care/goals, treatment preferences, and shares the quality data associated with the providers? Yes   Acute massive pulmonary embolism. On heparin gtt. Patient on bedrest. Vasc surg and pulmonary consulted. Independent and drove PTA. No DME. Lives alone. PT/OT to eval when appropriate. Continue to follow. Uses [facility].

## 2023-03-16 NOTE — PROGRESS NOTES
Transitions of Care Pharmacy Service   Medication Review    The patient's list of current home medications has been reviewed. Source(s) of information: spoke to patient and sure scripts     Based on information provided by the above source(s), I have updated the patient's home med list as described below. I changed or updated the following medications on the patient's home medication list:  Discontinued None      Added None      Adjusted   Multiple Vitamins-Minerals (OCUVITE EYE HEATLH GUMMIES PO)     Other Notes None            Please feel free to call me with any questions about this encounter. Thank you. This note will be reviewed and co-signed by the [facility] Pharmacist. The pharmacist will review inpatient orders and contact the physician about any discrepancies.     Corrina Guaman, pharmacy technician  Transitions [facility] Pharmacy Service  Phone:  [phone]  Fax: [phone]      Electronically signed by Corrina Guaman on 3/16/2023 at 2:55 PM       Prior to Admission medications    Medication Sig   Multiple Vitamins-Minerals (OCUVITE EYE HEATLH GUMMIES PO)   Take 1 tablet by mouth daily   Cholecalciferol (VITAMIN D3) 50 MCG (2000 UT) TABS Take 1 tablet by mouth daily   hydroCHLOROthiazide (HYDRODIURIL) 25 MG tablet Take 25 mg by mouth every morning   aspirin (ASPIRIN ADULT LOW STRENGTH) 81 MG EC tablet Take 1 tablet by mouth daily

## 2023-03-16 NOTE — ED PROVIDER NOTES
EMERGENCY DEPARTMENT ENCOUNTER    Pt Name: Mimi Grady  MRN: [id number]  [place] 1950  Date of evaluation: 3/15/23  CHIEF COMPLAINT       Chief Complaint   Patient presents with    Shortness of Breath     X 1 week. Pts pcp sent her to ed for concern for blood clots in lungs. Pt states she had blood clots 2 years ago. Pt states she is no longer on blood thinners     HISTORY OF PRESENT ILLNESS   68-year-old female presents emergency room for shortness of breath. Shortness of breath is not particularly severe unless patient is ambulating. She does not have chest pain. Symptoms have been going on for about a week. Patient does have history of a pulmonary embolism in the past.  She was on anticoagulation but she was taken off of it sometime ago. No recent illnesses. No fevers. Patient feels pretty much normal at rest.  Daughter is very concerned because these are the same symptoms she had when she had the pulmonary embolism. REVIEW OF SYSTEMS     Review of Systems   Respiratory:  Positive for shortness of breath.     PASTMEDICAL HISTORY     Past Medical History:   Diagnosis Date    Acute pulmonary embolism (HCC)     COPD (chronic obstructive pulmonary disease) (HCC)     Disease of blood and blood forming organ     Elevated blood pressure readings 1/4/2021    Hypertension     Troponin level elevated 1/4/2021     Past Problem List  Patient Active Problem List   Diagnosis Code    Bilateral pulmonary embolism (HCC) I26.99    Obesity due to excess calories without serious comorbidity E66.09    Anemia, normocytic normochromic D64.9    Hyperthyroidism E05.90    Lymphedema of both lower extremities I89.0    Body mass index (BMI) 45.0-49.9, adult ([facility] Z68.42    Hyperkalemia E87.5    Essential hypertension I10    Current mild episode of major depressive disorder without prior episode ([facility] F32.0    Vitamin D deficiency E55.9    Chronic renal disease, stage III ([facility] [774016] N18.30    Mixed hyperlipidemia E78.2    Prediabetes R73.03    Primary osteoarthritis of both knees M17.0     SURGICAL HISTORY       Past Surgical History:   Procedure Laterality Date    KNEE ARTHROSCOPY       CURRENT MEDICATIONS       Previous Medications    ASPIRIN (ASPIRIN ADULT LOW STRENGTH) 81 MG EC TABLET    Take 1 tablet by mouth daily    BLOOD PRESSURE KIT    Use to check BP twice a day    CHOLECALCIFEROL (VITAMIN D3) 50 MCG (2000) TABS    Take 1 tablet by mouth daily    HYDROCHLOROTHIAZIDE (HYDRODIURIL) 25 MG TABLET    TAKE ONE TABLET BY MOUTH EVERY MORNING    MULTIPLE VITAMINS-MINERALS (OCUVITE EYE HEATLH GUMMIES PO)    Take by mouth     ALLERGIES     has No Known Allergies. FAMILY HISTORY     She indicated that her mother is . She indicated that her father is . SOCIAL HISTORY       Social History     Tobacco Use    Smoking status: Never    Smokeless tobacco: Never   Vaping Use    Vaping Use: Never used   Substance Use Topics    Alcohol use: Yes    Drug use: Never     PHYSICAL EXAM     INITIAL VITALS: BP (!) 136/104   Pulse 96   Temp 97.5 °F (36.4 °C) (Oral)   Resp 26   Ht 5' 2\" (1.575 m)   Wt 258 lb (117 kg)   SpO2 95%   BMI 47.19 kg/m²    Physical Exam  Constitutional:       General: She is not in acute distress. Appearance: She is well-developed. HENT:      Head: Normocephalic. Eyes:      Pupils: Pupils are equal, round, and reactive to light. Cardiovascular:      Rate and Rhythm: Normal rate and regular rhythm. Heart sounds: Normal heart sounds. Pulmonary:      Effort: Pulmonary effort is normal. No respiratory distress. Breath sounds: Normal breath sounds. Abdominal:      General: Bowel sounds are normal.      Palpations: Abdomen is soft. Tenderness: There is no abdominal tenderness. Musculoskeletal:         General: Normal range of motion. Skin:     General: Skin is warm and dry. Neurological:      Mental Status: She is alert and oriented to person, place, and time. MEDICAL DECISION MAKING / ED COURSE:   Summary of Patient Presentation:        1)  Number and Complexity of Problems  Problem List This Visit: Shortness of breath    Differential Diagnosis: Acute coronary syndrome, pneumonia, pulmonary embolism, pneumothorax pleural or pericardial effusion      Pertinent Comorbid Conditions: History of PE    2)  Data Reviewed  My EKG interpretation: EKG sinus tachycardia ventricular rate 106  Nonspecific ST changes lateral leads  Some motion artifact present    QTc 448      See more data below for the lab and radiology tests and orders. 3)  Treatment and Disposition    Patient repeat assessment: Nondistressed stable 58-year-old female presenting to the emergency room for shortness of breath. Patient does have massive PE on CT imaging here in the ED with some evidence of right heart strain. Case was discussed with Dr. Anni echevarria who recommends stat echo in the morning to evaluate for right heart strain. Patient case also discussed with Dr. Aashish Merrill to knee on-call for critical care and pulmonary medicine. Patient will be started on high intensity heparin. Case discussed with Benedict who will accept. \"ED Course\" Notes From Epic Narrator:  ED Course as of 03/15/23 2303   Wed Mar 15, 2023   2223 I was updated on patient's CT results showing massive PE in multiple segmental and subsegmental vessels with right heart strain [EG]      ED Course User Index  [EG] Uzair Crawford MD         CRITICAL CARE:       PROCEDURES:    Procedures      DATA FOR LAB AND RADIOLOGY TESTS ORDERED BELOW ARE REVIEWED BY THE ED CLINICIAN:    RADIOLOGY: All x-rays, CT, MRI, and formal ultrasound images (except ED bedside ultrasound) are read by the radiologist, see reports below, unless otherwise noted in MDM or here. Reports below are reviewed by myself.   CT CHEST PULMONARY EMBOLISM W CONTRAST   Final Result   Massive burden of pulmonary embolism with pulmonary emboli noted from main pulmonary artery to segmental and subsegmental branches with associated   right-sided cardiac strain. Cholelithiasis with no signs of cholecystitis. 18 mm right adrenal nodule likely an adenoma. Critical results were called by Dr. Savanah Styles MD to Dr. Matias Brumfield   on 3/15/2023 at 22:20. LABS: Lab orders shown below, the results are reviewed by myself, and all abnormals are listed below. Labs Reviewed   CBC WITH AUTO DIFFERENTIAL - Abnormal; Notable for the following components:       Result Value    RBC 3.83 (*)     Seg Neutrophils 66 (*)     Lymphocytes 21 (*)     Immature Granulocytes 1 (*)     All other components within normal limits   BASIC METABOLIC PANEL - Abnormal; Notable for the following components:    Glucose 105 (*)     Creatinine 1.00 (*)     Est, Glom Filt Rate 59 (*)     Potassium 3.4 (*)     All other components within normal limits   TROPONIN   PROTIME-INR   APTT   ANTI-XA, UNFRACTIONATED HEPARIN   ANTI-XA, UNFRACTIONATED HEPARIN       Vitals Reviewed:    Vitals:    03/15/23 2100 03/15/23 2125 03/15/23 2200 03/15/23 2230   BP:       Pulse: 85 86 99 96   Resp: 21 22 24 26   Temp:       TempSrc:       SpO2: 94% 95% 96% 95%   Weight:       Height:         MEDICATIONS GIVEN TO PATIENT THIS ENCOUNTER:  Orders Placed This Encounter   Medications    DISCONTD: iopamidol (ISOVUE-370) 76 % injection 75 mL    sodium chloride flush 0.9 % injection 10 mL    0.9 % sodium chloride bolus    iopamidol (ISOVUE-370) 76 % injection 100 mL    heparin (porcine) injection 9,360 Units    heparin (porcine) injection 9,360 Units    heparin (porcine) injection 4,680 Units    heparin 25,000 units in dextrose 5% 250 mL (premix) infusion     DISCHARGE PRESCRIPTIONS:  New Prescriptions    No medications on file     PHYSICIAN CONSULTS ORDERED THIS ENCOUNTER:  IP CONSULT TO VASCULAR SURGERY  IP CONSULT TO PULMONOLOGY  IP CONSULT TO INTERNAL MEDICINE  FINAL IMPRESSION      1.  Acute pulmonary embolism with acute cor pulmonale, unspecified pulmonary embolism type [facility])          DISPOSITION/PLAN   DISPOSITION Decision To Admit 03/15/2023 10:25:18 PM      OUTPATIENT FOLLOW UP THE PATIENT:  No follow-up provider specified.     MD Ji Craig MD  03/15/23 6629

## 2023-03-16 NOTE — CONSULTS
Pulmonary Medicine and [address] Lizzette Clayton MD      Patient - Javan Rose   MRN -  [id number]   [facility] # - [de-identified]   - 1950      Date of Admission -  3/15/2023  8:10 PM  Date of evaluation -  3/16/2023  Room - -   Hospital Day - [facility] Primary Care Physician - Charan Wagner MD     Reason for Consult    PE    Assessment   Acute hypoxic respiratory insufficiency  Acute pulmonary emboli, massive clot burden with evidence of right heart strain  8 mm right adrenal nodule, likely adenoma  Suspected obstructive sleep apnea/Obesity  History of PE in 2021  CKD, HLD, HTN    Recommendations   Oxygen via nasal cannula, keep SPO2 90% or greater   Incentive spirometry every hour while awake   Continue heparin drip   Await vascular surgery input about EKOS  Bilateral lower extremity Dopplers  Echocardiogram  X-ray chest in am  Labs: CBC and BMP in am  Sleep apnea evaluation as an outpatient  Will follow with you    Problem List      Patient Active Problem List   Diagnosis    Bilateral pulmonary embolism ([address]    Obesity due to excess calories without serious comorbidity    Anemia, normocytic normochromic    Hyperthyroidism    Lymphedema of both lower extremities    Body mass index (BMI) 45.0-49.9, adult ([address]    Essential hypertension    Current mild episode of major depressive disorder without prior episode ([address]    Vitamin D deficiency    Chronic renal disease, stage III ([address] [146202]    Mixed hyperlipidemia    Prediabetes    Primary osteoarthritis of both knees    Hypokalemia    Acute massive pulmonary embolism ([address]       HPI     Javan Rose is 74 y.o.,  female presented to the emergency room yesterday evening with complaints of shortness of breath. Shortness of breath is only severe if she is ambulating. She denies chest pain. Symptoms have been present for about a week.   She does have a history of pulmonary embolism about 2 years ago which was thought to be related to a 3-hour road trip during the holidays and an extended amount of time sitting at her father's bedside. At that time her echo showed EF of 60% and bilateral lower extremity Dopplers were negative for DVT. She was prescribed Xarelto at the time of discharge which she was on for 3 to 6 months. She denies any fever or chills. No cough. CTA of the chest showed massive PE and multiple segmental and subsegmental vessels with right heart strain. She was started on heparin drip and admitted for further management. She denies any history of recent travel or immobility. She is currently sitting up in the bed, no distress. She is on oxygen 2 L SPO2 98 to 100%. She denies any shortness of breath at this time, no cough or chest pain. She has never been checked for sleep apnea. She used to smoke, around 30-pack-year smoking history, quit about 20 years ago. She does not take any inhalers at home. She does not wear oxygen at home.   PMHx   Past Medical History      Diagnosis Date    Acute pulmonary embolism (HCC)     COPD (chronic obstructive pulmonary disease) (HCC)     Disease of blood and blood forming organ     Elevated blood pressure readings 1/4/2021    Hypertension     Troponin level elevated 1/4/2021      Past Surgical History        Procedure Laterality Date    KNEE ARTHROSCOPY         Meds    Current Medications    sodium chloride flush  5-40 mL IntraVENous 2 times per day    aspirin  81 mg Oral Daily    sodium chloride  80 mL IntraVENous Once     sodium chloride flush, sodium chloride, potassium chloride **OR** potassium alternative oral replacement **OR** potassium chloride, magnesium sulfate, ondansetron **OR** ondansetron, polyethylene glycol, acetaminophen **OR** acetaminophen, sodium chloride flush, heparin (porcine), heparin (porcine)  IV Drips/Infusions   sodium chloride      heparin (PORCINE) Infusion 14 Units/kg/hr (03/16/23 0630)     Home Medications  Medications Prior to Admission: Multiple Vitamins-Minerals (OCUVITE EYE HEATLH GUMMIES PO), Take by mouth  Cholecalciferol (VITAMIN D3) 50 MCG (2000 UT) TABS, Take 1 tablet by mouth daily  hydroCHLOROthiazide (HYDRODIURIL) 25 MG tablet, TAKE ONE TABLET BY MOUTH EVERY MORNING  aspirin (ASPIRIN ADULT LOW STRENGTH) 81 MG EC tablet, Take 1 tablet by mouth daily  Blood Pressure KIT, Use to check BP twice a day    Allergies    Patient has no known allergies. Social History     Social History     Tobacco Use    Smoking status: Never    Smokeless tobacco: Never   Substance Use Topics    Alcohol use: Yes     Family History          Problem Relation Age of Onset    Cancer Mother     Kidney Disease Father      ROS - 6 systems   General Denies any fever or chills  HEENT Denies any diplopia, tinnitus or vertigo  Resp positive for  dyspnea with exertion  Cardiac Denies any chest pain, palpitations, claudication or edema  GI Denies any melena, hematochezia, hematemesis or pyrosis   Denies any frequency, urgency, hesitancy or incontinence  Heme Denies bruising or bleeding easily  Endocrine Denies any history of diabetes or thyroid disease  Neuro Denies any focal motor or sensory deficits  Psychiatric Denies anxiety, depression, suicidal ideation  Skin Denies rashes, itching, open sores    Vitals     height is 5' 2\" (1.575 m) and weight is 255 lb 3.2 oz (115.8 kg). Her temporal temperature is 98.7 °F (37.1 °C). Her blood pressure is 170/93 (abnormal) and her pulse is 90. Her respiration is 31 (abnormal) and oxygen saturation is 95%. Body mass index is 46.68 kg/m². I/O      Intake/Output Summary (Last 24 hours) at 3/16/2023 0959  Last data filed at 3/16/2023 0557  Gross per 24 hour   Intake 203.84 ml   Output --   Net 203.84 ml     I/O last 3 completed shifts:   In: 203.8 [I.V.:123.8; IV Piggyback:80]  Out: -    Patient Vitals for the past 96 hrs (Last 3 readings):   Weight   03/16/23 0150 255 lb 3.2 oz (115.8 kg)   03/15/23 1927 258 lb (117 kg) Exam   General Appearance Awake, alert, oriented, in no acute distress  HEENT - Head is normocephalic, atraumatic. Pupil reactive to light  Neck - Supple,  trachea midline and straight  Lungs -good air exchange, no crackles or wheezing  Cardiovascular - Heart sounds are normal.  Regular rhythm normal rate without murmur, gallop or rub. Abdomen - Soft, nontender, nondistended, no masses or organomegaly  Neurologic - CN II-XII are grossly intact.  There are no focal motor or sensory deficits  Skin - No bruising or bleeding  Extremities - No cyanosis, clubbing or edema    Labs  - Old records and notes have been reviewed in [facility]   CBC     Lab Results   Component Value Date/Time    WBC 7.9 03/15/2023 08:40 PM    RBC 3.83 03/15/2023 08:40 PM    HGB 12.0 03/15/2023 08:40 PM    HCT 36.7 03/15/2023 08:40 PM     03/15/2023 08:40 PM    MCV 95.8 03/15/2023 08:40 PM    MCH 31.3 03/15/2023 08:40 PM    MCHC 32.7 03/15/2023 08:40 PM    RDW 13.4 03/15/2023 08:40 PM    LYMPHOPCT 21 03/15/2023 08:40 PM    MONOPCT 10 03/15/2023 08:40 PM    BASOPCT 1 03/15/2023 08:40 PM    MONOSABS 0.82 03/15/2023 08:40 PM    LYMPHSABS 1.63 03/15/2023 08:40 PM    EOSABS 0.04 03/15/2023 08:40 PM    BASOSABS 0.04 03/15/2023 08:40 PM    DIFFTYPE NOT REPORTED 01/29/2021 02:10 AM     BMP   Lab Results   Component Value Date/Time     03/15/2023 08:40 PM    K 3.4 03/15/2023 08:40 PM     03/15/2023 08:40 PM    CO2 23 03/15/2023 08:40 PM    BUN 18 03/15/2023 08:40 PM    CREATININE 1.00 03/15/2023 08:40 PM    GLUCOSE 105 03/15/2023 08:40 PM    CALCIUM 9.3 03/15/2023 08:40 PM    MG 2.3 01/29/2021 02:10 AM     LFTS  Lab Results   Component Value Date/Time    ALKPHOS 81 09/09/2022 11:17 AM    ALT 13 09/09/2022 11:17 AM    AST 21 09/09/2022 11:17 AM    PROT 7.2 09/09/2022 11:17 AM    BILITOT 0.2 09/09/2022 11:17 AM    LABALBU 4.1 09/09/2022 11:17 AM     PTT  Lab Results   Component Value Date    APTT 29.9 03/15/2023     INR   Lab Results Component Value Date    INR 1.0 03/15/2023    INR 1.1 01/03/2021    PROTIME 13.5 03/15/2023    PROTIME 13.7 01/03/2021       Radiology      CTA chest 3/15/2023  Massive burden of pulmonary embolism with pulmonary emboli noted from main   pulmonary artery to segmental and subsegmental branches with associated   right-sided cardiac strain. Cholelithiasis with no signs of cholecystitis. 18 mm right adrenal nodule likely an adenoma. \"Thank you for asking us to see this patient\"    Case discussed with nurse and patient. Questions and concerns addressed.     Electronically signed by     Kimberly Burch MD on 3/16/2023 at 12:00 PM  Pulmonary Critical Care and Sleep Medicine,  [facility]  Cell: [phone]  Office: [phone]

## 2023-03-16 NOTE — PROGRESS NOTES
[id number] per pulmonology Dr. Sara Ventura , and Vascular Dr. Lakisha Valladares for patient to be transferred to progressive status     Fax sent to central supply for 20 to 30 mmHg knee-high Jobst stockings

## 2023-03-16 NOTE — PROGRESS NOTES
Physician Progress Note      PATIENT:               Alejandro Monterroso  CSN #:                  [id number]  :                       1950  ADMIT DATE:       3/15/2023 8:10 PM  [address] [place] DATE:  Ema Dial  PROVIDER #:        Rick Vilchis DO          QUERY TEXT:    Pt admitted with SOB. Pt noted to have a pulmonary embolism. If possible,   please document in the progress notes and discharge summary if you are   evaluating and / or treating any of the following: The medical record reflects the following:  Risk Factors: PMH of PE  Clinical Indicators: CT \"Massive burden of pulmonary embolism with pulmonary   emboli noted from main pulmonary artery to segmental and subsegmental branches   with associated right-sided cardiac strain. \"  Treatment: IV Heparin, Vascular and Pulmonology following, Echo    Thank you,  Vu Patino RN  Options provided:  -- Pulmonary Embolism with Acute Cor Pulmonale  -- Pulmonary Embolism without Acute Cor Pulmonale  -- Other - I will add my own diagnosis  -- Disagree - Not applicable / Not valid  -- Disagree - Clinically unable to determine / Unknown  -- Refer to Clinical Documentation Reviewer    PROVIDER RESPONSE TEXT:    This patient has Pulmonary Embolism without acute cor pulmonale. Query created by:  Jason Alonzo on 3/16/2023 6:52 AM      Electronically signed by:  Rick Vilchis DO 3/16/2023 3:26 PM

## 2023-03-16 NOTE — PROGRESS NOTES
[facility]  Office: [address], DO, Clare Robert, DO, Marii Beverly, DO, Yovany Mahoney Blood, DO, Blank Aguila MD, Riky Wynn MD, Elzbieta Puentes MD, Cade Bhat MD,  Clyde Nelson MD, Jordyn Coulter MD, Vitaliy Fuentes, DO, Napoleon Allen MD,  Sanchez Wheat MD, Charlotte Shah MD, Lalo Hines, DO, Marcella Durán MD, Suzan Fraire MD, Karolina Shirley DO, Driss Jain MD, Sudhir Shine MD, Concepción Mcdowell MD, Michael Orellana MD, Phu Boyce MD, Luis Cochran DO, Klaudia Bravo MD, Narinder Morales MD, Shelli King, CNP,  Jenna Coon, CNP, Laney Prasad, CNP, Donte Hui, CNP,  Usha Ley, [facility], Hillary Anthony, CNP, Rosangela Goddard, CNP, Madhavi Richards, CNP, Erin Zimmerman, CNP, Kash Hubbard, CNP, Radha Hoover PA-C, Tate Rush, CNS, Berta Jones, CNP, Eduar Stein [address]    Progress Note    3/16/2023    3:33 PM    Name:   Mili Veras  MRN:     [id number]     Acct:      [de-identified]   Room:   2032/2032-01  IP Day:  0  Admit Date:  3/15/2023  8:10 PM    PCP:   Ally Loomis MD  Code Status:  Full Code    Subjective:     C/C:   Chief Complaint   Patient presents with    Shortness of Breath     X 1 week. Pts pcp sent her to ed for concern for blood clots in lungs. Pt states she had blood clots 2 years ago. Pt states she is no longer on blood thinners     Interval History Status:   Improved  Less shortness of breath/dyspnea on exertion  No chest pain  No hemoptysis    Data Base Updates:  Telemetry reveals normal sinus rhythm    O2 sats satisfactory on supplemental oxygen      Brief History:     As documented in the medical record:  Bira Jorge is a 74 y.o. Non- / non  female who presents with Shortness of Breath (X 1 week. Pts pcp sent her to ed for concern for blood clots in lungs. Pt states she had blood clots 2 years ago.  Pt states she is no longer on blood thinners)   and is admitted to the hospital for the management of Acute massive pulmonary embolism ([facility] [address] Patient presented to the ER with complaints of shortness of breath. Greater the patient she is felt short of breath while ambulating for about 2 weeks. She denies chest pain, fever or chills. She has a history of a PE approximately 2 years ago. Per chart review in January 21 the patient was admitted with bilateral pulmonary embolus. PE at that time was thought to be related to a 3-hour road trip during the holidays and an extended amount of time caring for her father and sitting by his bedside. Echo at that time showed an EF of 60%. Bilateral venous Dopplers of the lower extremities were negative for DVT. At the time of discharge she was prescribed Xarelto and required home oxygen for a little while. She states she has been off the Xarelto and oxygen for a while but but she is not sure how long. Today on arrival to the ER she is slightly tachycardic at 105 but she is not hypoxic. A CTA of the chest shows a massive burden of pulmonary embolus with pulmonary emboli noted from the main pulmonary artery to the segmental and subsegmental branches with associated right heart strain. Her BUN and creatinine are 18/1.00 which is consistent with her creatinines in the past. \"    The intitial assessment and plan included:  \"  * (Principal) Acute massive pulmonary embolism ([facility] [address] 3/16/2023 Yes      Chronic renal disease, stage III [facility]) [240881] 3/15/2023 Yes     Hypokalemia 3/15/2023 Yes     Obesity due to excess calories without serious comorbidity 3/15/2023 Yes     Anemia, normocytic normochromic 3/15/2023 Yes     Essential hypertension 3/15/2023 Yes        Plan:   Patient status inpatient in the  Medical ICU   Acute massive PE with right heart strain per CT  Stat echo in a.m.   Vascular and pulmonary following  Heparin drip per PE protocol  Oxygen maintain sat greater than 90%   Check lipids    CKD 3  Avoid nephrotoxic agents  Repeat BMP in a.m. Hypokalemia  Electrolyte replacement ordered   Obesity  Patient is currently working on weight loss for an upcoming knee surgery and reports a 40 pound weight loss over the last year   Anemia  Stable   Essential hypertension  Hold home dose of hydrochlorothiazide  Monitor BP  When she was discharged 2 years ago she was sent home with prescriptions for Coreg and lisinopril those were later discontinued \"     Additional database has included:  As documented in the patient's record she was admitted in 2021 with a pulmonary embolism after a road trip  She states that she was on anticoagulation for less than 1 year  Family history is negative for coagulopathy or hypercoagulable states    Echo   Summary   Left ventricle is normal in size   Global left ventricular systolic function is normal   Estimated ejection fraction is 65 % . Left atrium is mildly dilated. Mild tricuspid regurgitation. Mild pulmonary hypertension. No pericardial effusion seen. Normal aortic root dimension. CTA  Impression:    Massive burden of pulmonary embolism with pulmonary emboli noted from main   pulmonary artery to segmental and subsegmental branches with associated   right-sided cardiac strain. Cholelithiasis with no signs of cholecystitis. 18 mm right adrenal nodule likely an adenoma. EKG:  Sinus tachycardia   Cannot rule out Anterior infarct , age undetermined   Abnormal ECG   When compared with ECG of 29-JAN-2021 02:06,   T wave inversion no longer evident in Inferior leads     Past Medical History:   has a past medical history of Acute pulmonary embolism ([address] COPD (chronic obstructive pulmonary disease) ([address] Disease of blood and blood forming organ, Elevated blood pressure readings, Hypertension, and Troponin level elevated. Social History:   reports that she has never smoked. She has never used smokeless tobacco. She reports current alcohol use.  She reports that she does not use drugs. Family History:   Family History   Problem Relation Age of Onset    Cancer Mother     Kidney Disease Father        Review of Systems:     Review of Systems   Constitutional:  Positive for activity change and fatigue (Exercise capacity decreased). Respiratory:  Positive for chest tightness and shortness of breath (Dyspnea on exertion). Negative for cough. Cardiovascular:  Positive for leg swelling (Chronic). Negative for chest pain and palpitations. Gastrointestinal:  Negative for abdominal pain, nausea and vomiting. Genitourinary:  Negative for flank pain and hematuria. Physical Examination:        Vitals  BP (!) 156/77   Pulse 81   Temp 98 °F (36.7 °C) (Temporal)   Resp 13   Ht 5' 2\" (1.575 m)   Wt 255 lb 3.2 oz (115.8 kg)   SpO2 96%   BMI 46.68 kg/m²   Temp (24hrs), Av.9 °F (36.6 °C), Min:97.3 °F (36.3 °C), Max:98.7 °F (37.1 °C)    No results for input(s): POCGLU in the last 72 hours. Physical Exam  Constitutional:       General: She is not in acute distress. Appearance: She is not diaphoretic. HENT:      Head: Normocephalic and atraumatic. Eyes:      General: No scleral icterus. Neck:      Thyroid: No thyromegaly. Vascular: No JVD. Trachea: No tracheal deviation. Cardiovascular:      Rate and Rhythm: Normal rate and regular rhythm. Heart sounds: Normal heart sounds. No murmur heard. Pulmonary:      Effort: Pulmonary effort is normal. No respiratory distress. Breath sounds: Normal breath sounds. No wheezing or rales. Comments: Airflow reduced  No retractions  No accessory muscle use  No cyanosis      Abdominal:      General: Bowel sounds are normal. There is no distension. Palpations: Abdomen is soft. Musculoskeletal:         General: No tenderness. Cervical back: Neck supple. Right lower leg: Edema present. Left lower leg: Edema present.       Comments: 1-2/4 edema at the ankles   Skin:     General: Skin is warm and dry. Neurological:      Mental Status: She is alert and oriented to person, place, and time. Medications: Allergies:  No Known Allergies    Current Meds:   Scheduled Meds:    sodium chloride flush  5-40 mL IntraVENous 2 times per day    aspirin  81 mg Oral Daily    sodium chloride  80 mL IntraVENous Once     Continuous Infusions:    sodium chloride      heparin (PORCINE) Infusion 11 Units/kg/hr (03/16/23 1352)     PRN Meds: sodium chloride flush, sodium chloride, potassium chloride **OR** potassium alternative oral replacement **OR** potassium chloride, magnesium sulfate, ondansetron **OR** ondansetron, polyethylene glycol, acetaminophen **OR** acetaminophen, sodium chloride flush, heparin (porcine), heparin (porcine)    Data:     I/O (24Hr): Intake/Output Summary (Last 24 hours) at 3/16/2023 1533  Last data filed at 3/16/2023 0557  Gross per 24 hour   Intake 203.84 ml   Output --   Net 203.84 ml       Labs:  Hematology:  Recent Labs     03/15/23  2040   WBC 7.9   RBC 3.83*   HGB 12.0   HCT 36.7   MCV 95.8   MCH 31.3   MCHC 32.7   RDW 13.4      MPV 10.7   INR 1.0     Chemistry:  Recent Labs     03/15/23  2040      K 3.4*      CO2 23   GLUCOSE 105*   BUN 18   CREATININE 1.00*   ANIONGAP 14   LABGLOM 59*   CALCIUM 9.3   TROPHS 14     Recent Labs     03/16/23  0436   CHOL 176   HDL 56   LDLCHOLESTEROL 106   CHOLHDLRATIO 3.1   TRIG 71     ABG:No results found for: POCPH, PHART, PH, POCPCO2, WTX9KHH, PCO2, POCPO2, PO2ART, PO2, POCHCO3, DOR2AOV, HCO3, NBEA, PBEA, BEART, BE, THGBART, THB, WKZ3XHM, ZIQI1VKI, D0RTMQNF, O2SAT, FIO2  No results found for: SPECIAL  No results found for: CULTURE    Radiology:  CT CHEST PULMONARY EMBOLISM W CONTRAST    Result Date: 3/15/2023  Massive burden of pulmonary embolism with pulmonary emboli noted from main pulmonary artery to segmental and subsegmental branches with associated right-sided cardiac strain.  Cholelithiasis with no signs of cholecystitis. 18 mm right adrenal nodule likely an adenoma. Critical results were called by Dr. Teresa Perry MD to Dr. Ananth Slaughter on 3/15/2023 at 22:20.        Assessment:        Primary Problem  Acute massive pulmonary embolism [facility])    Active Hospital Problems    Diagnosis Date Noted    Acute massive pulmonary embolism ([facility] [address] [I26.99] 03/16/2023     Priority: Medium    Cholelithiasis without cholecystitis [K80.20] 03/16/2023     Priority: Medium    Adrenal nodule ([facility] [address] right side [E27.8] 03/16/2023     Priority: Medium    Hypokalemia [E87.6] 03/15/2023     Priority: Medium    Chronic renal disease, stage III ([facility] [address] [244419] [N18.30] 06/07/2022     Priority: Medium    Hypercoagulable state ([facility] [address] [D68.59] 03/07/2022    Essential hypertension [I10] 03/04/2021    BMI 45.0-49.9, adult ([facility] [address] [Z68.42] 02/04/2021    Anemia, normocytic normochromic [D64.9] 01/04/2021    Obesity due to excess calories without serious comorbidity [E66.09]          Plan:        Heparin  Blood Pressure - Monitor and control  Anemia w/u on outpatient basis is suggested   Electrolyte replacement as needed  Vascular evaluation  Venous Dopplers pending  EKOS not indicated at this time  Pulmonary evaluation  Outpatient sleep study to be arranged  Check bun and creatinine  Avoid nephrotoxins  Outpatient evaluation and surveillance regarding adrenal adenoma  Risk factor management / weight loss   Cholelithiasis appears to be stable/asymptomatic: Defer further evaluation at this time  DVT prophylaxis    IP CONSULT TO VASCULAR SURGERY  IP CONSULT TO PULMONOLOGY  IP CONSULT TO INTERNAL MEDICINE  IP CONSULT TO PULMONOLOGY  IP CONSULT TO [address], DO  3/16/2023  3:33 PM

## 2023-03-16 NOTE — PROGRESS NOTES
ADMISSION NOTE         Patient admitted to room: 2032     Time of admit: 0145     Admit from: ED      Reason for admission: PE       Family at bedside: No     Patient is currently: Patient arrived to room, ambulated to bed independently. Resting in bed comfortably, vitals obtained, telemetry placed on pt. No distress noted. Patient has been oriented to room, educated on how to use call light, and to call for assistance prior to getting up. Bed in lowest and locked position. 2 siderails up for safety. Call light within reach. Signed and held orders released. Patient was explained to that she is currently on strict BR  per MD orders. Bedpan or purwick offered.

## 2023-03-16 NOTE — H&P
[facility]  Office: [address], DO, Everton Wong, DO, Leida Orosco, DO, Walter Jada Blood, DO, Milli Huizar MD, Cassie Brink MD, Brandon iDckson MD, Prisca Joy MD,  Shirlene De La Rosa MD, Edgard Smith MD, Heraclio Rasheed, DO, Shirley Aguilar MD,  Cullen Chase MD, Ap Cruz MD, Parul Douglas DO, Lei Gonzales MD, Chandler Lombard, MD, Marissa Rosales DO, Zakia Blum MD, Marleen Barrera MD, Roseann Santos MD, Aguilar Granados MD, Murphy Gaines MD, Juana Solomon DO, Nat Mendosa MD, Fercho Mendes MD, Diamond Gloria, CNP,  Catrachita Barrow, CNP, Alistair Haro, CNP, Milly Howard, CNP,  Gwendolyn Baker, DNP, Verna Dial, CNP, Asya Tucker, CNP, Les Armstrong, CNP, Adalberto Randle, CNP, Pino Tamayo, CNP, Parker Traore, PA-C, Krishan Ruth, CNS, Haley Singh, CNP, Kiarra Cortez, CNP         [address]    HISTORY AND PHYSICAL EXAMINATION            Date:   3/16/2023  Patient name:  Nba Quinn  Date of admission:  3/15/2023  8:10 PM  MRN:   [id number]  Account:  [de-identified]  YOB: 1950  PCP:    Elen Poe MD  Room:   [place] [address]  Code Status:    Prior    Chief Complaint:     Chief Complaint   Patient presents with    Shortness of Breath     X 1 week. Pts pcp sent her to ed for concern for blood clots in lungs. Pt states she had blood clots 2 years ago. Pt states she is no longer on blood thinners       History Obtained From:     patient, family member -daughter    History of Present Illness:     Nba Quinn is a 74 y.o. Non- / non  female who presents with Shortness of Breath (X 1 week. Pts pcp sent her to ed for concern for blood clots in lungs. Pt states she had blood clots 2 years ago. Pt states she is no longer on blood thinners)   and is admitted to the hospital for the management of Acute massive pulmonary embolism ([address]     Patient presented to the ER with complaints of shortness of breath. Greater the patient she is felt short of breath while ambulating for about 2 weeks. She denies chest pain, fever or chills. She has a history of a PE approximately 2 years ago. Per chart review in January 21 the patient was admitted with bilateral pulmonary embolus. PE at that time was thought to be related to a 3-hour road trip during the holidays and an extended amount of time caring for her father and sitting by his bedside. Echo at that time showed an EF of 60%. Bilateral venous Dopplers of the lower extremities were negative for DVT. At the time of discharge she was prescribed Xarelto and required home oxygen for a little while. She states she has been off the Xarelto and oxygen for a while but but she is not sure how long. Today on arrival to the ER she is slightly tachycardic at 105 but she is not hypoxic. A CTA of the chest shows a massive burden of pulmonary embolus with pulmonary emboli noted from the main pulmonary artery to the segmental and subsegmental branches with associated right heart strain. Her BUN and creatinine are 18/1.00 which is consistent with her creatinines in the past.     Per the ER attending the case was discussed with pulmonary and vascular. A stat echo is recommended in the morning to evaluate the right heart strain. Patient was started on heparin drip per PE protocol    Past Medical History:     Past Medical History:   Diagnosis Date    Acute pulmonary embolism (HCC)     COPD (chronic obstructive pulmonary disease) ([facility] [address]     Disease of blood and blood forming organ     Elevated blood pressure readings 1/4/2021    Hypertension     Troponin level elevated 1/4/2021        Past Surgical History:     Past Surgical History:   Procedure Laterality Date    KNEE ARTHROSCOPY          Medications Prior to Admission:     Prior to Admission medications    Medication Sig Start Date End Date Taking?  Authorizing Provider   Multiple Vitamins-Minerals (OCUVITE EYE HEATLH GUMMIES PO) Take by mouth    Historical Provider, MD   Cholecalciferol (VITAMIN D3) 50 MCG ( UT) TABS Take 1 tablet by mouth daily 23   Tequila Brower MD   hydroCHLOROthiazide (HYDRODIURIL) 25 MG tablet TAKE ONE TABLET BY MOUTH EVERY MORNING 22   Tequila Brower MD   aspirin (ASPIRIN ADULT LOW STRENGTH) 81 MG EC tablet Take 1 tablet by mouth daily 22   Tequila Brower MD   Blood Pressure KIT Use to check BP twice a day 21   Tequila Brower MD        Allergies:     Patient has no known allergies. Social History:     Tobacco:    reports that she has never smoked. She has never used smokeless tobacco.  Alcohol:      reports current alcohol use. Drug Use:  reports no history of drug use. Family History:     Family History   Problem Relation Age of Onset    Cancer Mother     Kidney Disease Father        Review of Systems:     Positive and Negative as described in HPI. Review of Systems   Respiratory:  Positive for shortness of breath (With activity/ambulation). All other systems reviewed and are negative. Physical Exam:   BP (!) 136/104   Pulse (!) 105   Temp 97.5 °F (36.4 °C) (Oral)   Resp (!) 32   Ht 5' 2\" (1.575 m)   Wt 258 lb (117 kg)   SpO2 93%   BMI 47.19 kg/m²   Temp (24hrs), Av.5 °F (36.4 °C), Min:97.5 °F (36.4 °C), Max:97.5 °F (36.4 °C)    No results for input(s): POCGLU in the last 72 hours. Intake/Output Summary (Last 24 hours) at 3/16/2023 0126  Last data filed at 3/16/2023 0044  Gross per 24 hour   Intake 80 ml   Output --   Net 80 ml       Physical Exam  Vitals and nursing note reviewed. Constitutional:       Appearance: She is obese. Eyes:      Conjunctiva/sclera: Conjunctivae normal.   Cardiovascular:      Rate and Rhythm: Normal rate and regular rhythm. Pulses:           Dorsalis pedis pulses are 1+ on the right side and 2+ on the left side. Posterior tibial pulses are 1+ on the right side and 2+ on the left side.       Heart sounds: Normal heart sounds. Pulmonary:      Effort: Pulmonary effort is normal.      Breath sounds: Normal breath sounds. Abdominal:      General: Bowel sounds are normal.      Palpations: Abdomen is soft. Musculoskeletal:      Right lower le+ Edema present. Left lower le+ Edema present. Skin:     General: Skin is warm and dry. Capillary Refill: Capillary refill takes less than 2 seconds. Neurological:      Mental Status: She is alert and oriented to person, place, and time.        Investigations:      Laboratory Testing:  Recent Results (from the past 24 hour(s))   CBC with Auto Differential    Collection Time: 03/15/23  8:40 PM   Result Value Ref Range    WBC 7.9 3.5 - 11.3 k/uL    RBC 3.83 (L) 3.95 - 5.11 m/uL    Hemoglobin 12.0 11.9 - 15.1 g/dL    Hematocrit 36.7 36.3 - 47.1 %    MCV 95.8 82.6 - 102.9 fL    MCH 31.3 25.2 - 33.5 pg    MCHC 32.7 28.4 - 34.8 g/dL    RDW 13.4 11.8 - 14.4 %    Platelets 280 908 - 660 k/uL    MPV 10.7 8.1 - 13.5 fL    NRBC Automated 0.0 0.0 per 100 WBC    Seg Neutrophils 66 (H) 36 - 65 %    Lymphocytes 21 (L) 24 - 43 %    Monocytes 10 3 - 12 %    Eosinophils % 1 1 - 4 %    Basophils 1 0 - 2 %    Immature Granulocytes 1 (H) 0 %    Segs Absolute 5.23 1.50 - 8.10 k/uL    Absolute Lymph # 1.63 1.10 - 3.70 k/uL    Absolute Mono # 0.82 0.10 - 1.20 k/uL    Absolute Eos # 0.04 0.00 - 0.44 k/uL    Basophils Absolute 0.04 0.00 - 0.20 k/uL    Absolute Immature Granulocyte 0.10 0.00 - 0.30 k/uL   BMP    Collection Time: 03/15/23  8:40 PM   Result Value Ref Range    Glucose 105 (H) 70 - 99 mg/dL    BUN 18 8 - 23 mg/dL    Creatinine 1.00 (H) 0.50 - 0.90 mg/dL    Est, Glom Filt Rate 59 (L) >60 mL/min/1.73m2    Bun/Cre Ratio 18 9 - 20    Calcium 9.3 8.6 - 10.4 mg/dL    Sodium 137 135 - 144 mmol/L    Potassium 3.4 (L) 3.7 - 5.3 mmol/L    Chloride 100 98 - 107 mmol/L    CO2 23 20 - 31 mmol/L    Anion Gap 14 9 - 17 mmol/L   Troponin    Collection Time: 03/15/23  8:40 PM Result Value Ref Range    Troponin, High Sensitivity 14 0 - 14 ng/L   Protime-INR    Collection Time: 03/15/23  8:40 PM   Result Value Ref Range    Protime 13.5 11.5 - 14.2 sec    INR 1.0    APTT    Collection Time: 03/15/23  8:40 PM   Result Value Ref Range    PTT 29.9 23.9 - 33.8 sec       Imaging/Diagnostics:  CT CHEST PULMONARY EMBOLISM W CONTRAST    Result Date: 3/15/2023  Massive burden of pulmonary embolism with pulmonary emboli noted from main pulmonary artery to segmental and subsegmental branches with associated right-sided cardiac strain. Cholelithiasis with no signs of cholecystitis. 18 mm right adrenal nodule likely an adenoma. Critical results were called by Dr. Teresa Perry MD to Dr. Ananth Slaughter on 3/15/2023 at 22:20. Assessment :      Hospital Problems             Last Modified POA    * (Principal) Acute massive pulmonary embolism ([address] 3/16/2023 Yes    Chronic renal disease, stage III [facility]) [[id number]] 3/15/2023 Yes    Hypokalemia 3/15/2023 Yes    Obesity due to excess calories without serious comorbidity 3/15/2023 Yes    Anemia, normocytic normochromic 3/15/2023 Yes    Essential hypertension 3/15/2023 Yes       Plan:     Patient status inpatient in the  Medical ICU    Acute massive PE with right heart strain per CT  Stat echo in a.m. Vascular and pulmonary following  Heparin drip per PE protocol  Oxygen maintain sat greater than 90%    Check lipids     CKD 3  Avoid nephrotoxic agents  Repeat BMP in a.m.     Hypokalemia  Electrolyte replacement ordered    Obesity  Patient is currently working on weight loss for an upcoming knee surgery and reports a 40 pound weight loss over the last year    Anemia  Stable    Essential hypertension  Hold home dose of hydrochlorothiazide  Monitor BP  When she was discharged 2 years ago she was sent home with prescriptions for Coreg and lisinopril those were later discontinued    Consultations:   [id number] [place] [address] TO PULMONOLOGY  IP CONSULT TO INTERNAL MEDICINE  IP CONSULT TO PULMONOLOGY  IP CONSULT TO VASCULAR SURGERY    Patient is admitted as inpatient status because of co-morbidities listed above, severity of signs and symptoms as outlined, requirement for current medical therapies and most importantly because of direct risk to patient if care not provided in a hospital setting. Expected length of stay > 48 hours.     Jimi Kern, ALBINO - CNP  3/16/2023  1:26 AM    Copy sent to Dr. Kieran Arnold MD

## 2023-03-16 NOTE — PROGRESS NOTES
End Of Shift Note  [address] [place] [address] CVICU  Summary of shift: New admit for PE. On a heparin gtt. Plan for the patient is for an ECHO today and bilat dopplers of BLE. Currently on strict bed rest. Patients vitals remain stable. 2L NC d/t dyspnea with exertion and increased RR. K was 3.4 to replaced with oral 40 mEq 1x per PRN orders.      Vitals:    Vitals:    03/16/23 0200 03/16/23 0300 03/16/23 0400 03/16/23 0500   BP: (!) 152/129 (!) 140/109 (!) 120/109 (!) 141/93   Pulse: (!) 107 95 97 (!) 106   Resp: 19 24 23 24   Temp:   97.3 °F (36.3 °C)    TempSrc:   Temporal    SpO2: 96% 96% 97% 95%   Weight:       Height:            I&O:   Intake/Output Summary (Last 24 hours) at 3/16/2023 0558  Last data filed at 3/16/2023 0557  Gross per 24 hour   Intake 203.84 ml   Output --   Net 203.84 ml       Resp Status: 2L NC    Ventilator Settings:     / / /     Critical Care IV infusions:   sodium chloride      heparin (PORCINE) Infusion Stopped (03/16/23 0529)        LDA:   Peripheral IV 03/16/23 Left Forearm (Active)   Number of days: 0

## 2023-03-16 NOTE — ED NOTES
Pt placed on cardiac monitor and pulse ox. Pt had been changing into a hospital gown and had increased SOB. Pt O2 sat on RA was 86%, pt placed on 2L O2 via NC.       Praful Eaton RN  03/15/23 2018

## 2023-03-17 ENCOUNTER — TELEPHONE (OUTPATIENT)
Dept: FAMILY MEDICINE CLINIC | Age: 73
End: 2023-03-17

## 2023-03-17 VITALS
HEIGHT: 62 IN | HEART RATE: 107 BPM | WEIGHT: 257 LBS | SYSTOLIC BLOOD PRESSURE: 128 MMHG | DIASTOLIC BLOOD PRESSURE: 67 MMHG | TEMPERATURE: 97.8 F | RESPIRATION RATE: 25 BRPM | OXYGEN SATURATION: 99 % | BODY MASS INDEX: 47.29 KG/M2

## 2023-03-17 PROBLEM — M71.21 BAKER'S CYST OF KNEE, RIGHT: Status: ACTIVE | Noted: 2023-03-17

## 2023-03-17 PROBLEM — J96.01 ACUTE RESPIRATORY FAILURE WITH HYPOXIA (HCC): Status: ACTIVE | Noted: 2023-03-17

## 2023-03-17 LAB
ABSOLUTE EOS #: 0.12 K/UL (ref 0–0.44)
ABSOLUTE IMMATURE GRANULOCYTE: 0.13 K/UL (ref 0–0.3)
ABSOLUTE LYMPH #: 2.11 K/UL (ref 1.1–3.7)
ABSOLUTE MONO #: 1.02 K/UL (ref 0.1–1.2)
ANION GAP SERPL CALCULATED.3IONS-SCNC: 10 MMOL/L (ref 9–17)
ANTI-XA UNFRAC HEPARIN: 0.32 IU/L (ref 0.3–0.7)
ANTI-XA UNFRAC HEPARIN: 0.45 IU/L (ref 0.3–0.7)
BASOPHILS # BLD: 1 % (ref 0–2)
BASOPHILS ABSOLUTE: 0.07 K/UL (ref 0–0.2)
BUN SERPL-MCNC: 11 MG/DL (ref 8–23)
BUN/CREAT BLD: 12 (ref 9–20)
CALCIUM SERPL-MCNC: 8.9 MG/DL (ref 8.6–10.4)
CHLORIDE SERPL-SCNC: 102 MMOL/L (ref 98–107)
CO2 SERPL-SCNC: 23 MMOL/L (ref 20–31)
CREAT SERPL-MCNC: 0.9 MG/DL (ref 0.5–0.9)
EOSINOPHILS RELATIVE PERCENT: 1 % (ref 1–4)
GFR SERPL CREATININE-BSD FRML MDRD: >60 ML/MIN/1.73M2
GLUCOSE SERPL-MCNC: 117 MG/DL (ref 70–99)
HCT VFR BLD AUTO: 33.6 % (ref 36.3–47.1)
HGB BLD-MCNC: 11 G/DL (ref 11.9–15.1)
IMMATURE GRANULOCYTES: 1 %
INR PPP: 1
LYMPHOCYTES # BLD: 23 % (ref 24–43)
MCH RBC QN AUTO: 31.2 PG (ref 25.2–33.5)
MCHC RBC AUTO-ENTMCNC: 32.7 G/DL (ref 28.4–34.8)
MCV RBC AUTO: 95.2 FL (ref 82.6–102.9)
MONOCYTES # BLD: 11 % (ref 3–12)
NRBC AUTOMATED: 0 PER 100 WBC
PDW BLD-RTO: 13.4 % (ref 11.8–14.4)
PLATELET # BLD AUTO: 226 K/UL (ref 138–453)
PMV BLD AUTO: 10.8 FL (ref 8.1–13.5)
POTASSIUM SERPL-SCNC: 3.7 MMOL/L (ref 3.7–5.3)
PROTHROMBIN TIME: 13.6 SEC (ref 11.5–14.2)
RBC # BLD: 3.53 M/UL (ref 3.95–5.11)
SEG NEUTROPHILS: 63 % (ref 36–65)
SEGMENTED NEUTROPHILS ABSOLUTE COUNT: 5.92 K/UL (ref 1.5–8.1)
SODIUM SERPL-SCNC: 135 MMOL/L (ref 135–144)
WBC # BLD AUTO: 9.4 K/UL (ref 3.5–11.3)

## 2023-03-17 PROCEDURE — 99239 HOSP IP/OBS DSCHRG MGMT >30: CPT | Performed by: INTERNAL MEDICINE

## 2023-03-17 PROCEDURE — 2580000003 HC RX 258: Performed by: NURSE PRACTITIONER

## 2023-03-17 PROCEDURE — 6370000000 HC RX 637 (ALT 250 FOR IP): Performed by: NURSE PRACTITIONER

## 2023-03-17 PROCEDURE — 36415 COLL VENOUS BLD VENIPUNCTURE: CPT

## 2023-03-17 PROCEDURE — 85520 HEPARIN ASSAY: CPT

## 2023-03-17 PROCEDURE — 94761 N-INVAS EAR/PLS OXIMETRY MLT: CPT

## 2023-03-17 PROCEDURE — 85025 COMPLETE CBC W/AUTO DIFF WBC: CPT

## 2023-03-17 PROCEDURE — 6370000000 HC RX 637 (ALT 250 FOR IP): Performed by: INTERNAL MEDICINE

## 2023-03-17 PROCEDURE — 85610 PROTHROMBIN TIME: CPT

## 2023-03-17 PROCEDURE — 80048 BASIC METABOLIC PNL TOTAL CA: CPT

## 2023-03-17 RX ADMIN — SODIUM CHLORIDE, PRESERVATIVE FREE 10 ML: 5 INJECTION INTRAVENOUS at 09:33

## 2023-03-17 RX ADMIN — ACETAMINOPHEN 650 MG: 325 TABLET ORAL at 09:32

## 2023-03-17 RX ADMIN — ASPIRIN 81 MG: 81 TABLET, COATED ORAL at 09:32

## 2023-03-17 RX ADMIN — APIXABAN 10 MG: 5 TABLET, FILM COATED ORAL at 13:21

## 2023-03-17 ASSESSMENT — ENCOUNTER SYMPTOMS
VOMITING: 0
NAUSEA: 0
SHORTNESS OF BREATH: 1
CHEST TIGHTNESS: 0
ABDOMINAL PAIN: 0
COUGH: 0

## 2023-03-17 ASSESSMENT — PAIN SCALES - GENERAL: PAINLEVEL_OUTOF10: 0

## 2023-03-17 NOTE — PROGRESS NOTES
CLINICAL PHARMACY NOTE: MEDS TO BEDS    Total # of Prescriptions Filled: 1   The following medications were delivered to the patient:  Eliquis Starter Pack    Additional Documentation:     Delivered to the pt's room 3/17/23, no copay.

## 2023-03-17 NOTE — CARE COORDINATION
Discharge planning    Patient going home with O2. Faxed paperwork to [facility] and patient will call [facility] when she arrives home. Does have O2 portability to transition to home. Daughter to transport.

## 2023-03-17 NOTE — FLOWSHEET NOTE
Home Oxygen Evaluation    Home Oxygen Evaluation completed.     Patient is on 2 liters per minute via Nasal cannula  Resting SpO2 = 95%  Resting SpO2 on room air = 87%    SpO2 on room air with exercise = 87%  SpO2 on oxygen as above with exercise = n/a%        Valerie Campa RN  2:04 PM

## 2023-03-17 NOTE — PROGRESS NOTES
[facility]  Office: [address], DO, Drake Patella, DO, Joleen Villafuerte, DO, Yocasta Jordan Blood, DO, Leonardo Denney MD, Leigha Kirk MD, Guero Cage MD, Isa Jerez MD,  Calista Velasquez MD, Ciaran Miranda MD, Noe Mcdowell DO, Cindy Carvajal MD,  Loan Harley MD, Claudia Median MD, Moises Barrera DO, Nigel Rosales MD, Alvaro Fam MD, Nancy Harvey DO, Abigail Noel MD, Satya Becerra MD, Juan Luis Andrews MD, Fercho Nation MD, Irene Kent MD, Felipa Pendleton DO, Hanane Dowd MD, Anne Og MD, Ena Milian, CNP,  Wilmer Orozco, CNP, Oneyda Woodward, CNP, Merwyn Cooks, CNP,  Johanna Palacio, DNP, Lourdes Daniel, CNP, Jone Taylor, CNP, Gigi Vega, CNP, Lonny Saleem, CNP, Michael Lakhani, CNP, Gallo Gomez PA-C, Liliane Bailey, CNS, Lianet Dang, CNP, Eduar Brothers [address]    Progress Note    3/17/2023    12:45 PM    Name:   Dora Isidro  MRN:     [id number]     Acct:      [de-identified]   Room:   2032/203201   Day:  1  Admit Date:  3/15/2023  8:10 PM    PCP:   Halina Saint, MD  Code Status:  Full Code    Subjective:     C/C:   Chief Complaint   Patient presents with    Shortness of Breath     X 1 week. Pts pcp sent her to ed for concern for blood clots in lungs. Pt states she had blood clots 2 years ago. Pt states she is no longer on blood thinners     Interval History Status:   Improved  No chest pain  No SOB  Less LOPEZ    Data Base Updates:  Dopplers negative      Brief History:     As documented in the medical record:  Pancho Sal is a 74 y.o. Non- / non  female who presents with Shortness of Breath (X 1 week. Pts pcp sent her to ed for concern for blood clots in lungs. Pt states she had blood clots 2 years ago. Pt states she is no longer on blood thinners)   and is admitted to the hospital for the management of Acute massive pulmonary embolism ([address]      Patient presented to the ER with complaints of shortness of breath. Greater the patient she is felt short of breath while ambulating for about 2 weeks. She denies chest pain, fever or chills. She has a history of a PE approximately 2 years ago. Per chart review in January 21 the patient was admitted with bilateral pulmonary embolus. PE at that time was thought to be related to a 3-hour road trip during the holidays and an extended amount of time caring for her father and sitting by his bedside. Echo at that time showed an EF of 60%. Bilateral venous Dopplers of the lower extremities were negative for DVT. At the time of discharge she was prescribed Xarelto and required home oxygen for a little while. She states she has been off the Xarelto and oxygen for a while but but she is not sure how long. Today on arrival to the ER she is slightly tachycardic at 105 but she is not hypoxic. A CTA of the chest shows a massive burden of pulmonary embolus with pulmonary emboli noted from the main pulmonary artery to the segmental and subsegmental branches with associated right heart strain. Her BUN and creatinine are 18/1.00 which is consistent with her creatinines in the past. \"    The intitial assessment and plan included:  \"  * (Principal) Acute massive pulmonary embolism ([address] 3/16/2023 Yes      Chronic renal disease, stage III [facility]) [241673] 3/15/2023 Yes     Hypokalemia 3/15/2023 Yes     Obesity due to excess calories without serious comorbidity 3/15/2023 Yes     Anemia, normocytic normochromic 3/15/2023 Yes     Essential hypertension 3/15/2023 Yes        Plan:   Patient status inpatient in the  Medical ICU   Acute massive PE with right heart strain per CT  Stat echo in a.m. Vascular and pulmonary following  Heparin drip per PE protocol  Oxygen maintain sat greater than 90%   Check lipids    CKD 3  Avoid nephrotoxic agents  Repeat BMP in a.m.    Hypokalemia  Electrolyte replacement ordered   Obesity  Patient is currently working on weight loss for an upcoming knee surgery and reports a 40 pound weight loss over the last year   Anemia  Stable   Essential hypertension  Hold home dose of hydrochlorothiazide  Monitor BP  When she was discharged 2 years ago she was sent home with prescriptions for Coreg and lisinopril those were later discontinued \"     Additional database has included:  As documented in the patient's record she was admitted in 2021 with a pulmonary embolism after a road trip  She states that she was on anticoagulation for less than 1 year  Family history is negative for coagulopathy or hypercoagulable states    Echo   Summary   Left ventricle is normal in size   Global left ventricular systolic function is normal   Estimated ejection fraction is 65 % . Left atrium is mildly dilated. Mild tricuspid regurgitation. Mild pulmonary hypertension. No pericardial effusion seen. Normal aortic root dimension. CTA  Impression:    Massive burden of pulmonary embolism with pulmonary emboli noted from main   pulmonary artery to segmental and subsegmental branches with associated   right-sided cardiac strain. Cholelithiasis with no signs of cholecystitis. 18 mm right adrenal nodule likely an adenoma. EKG:  Sinus tachycardia   Cannot rule out Anterior infarct , age undetermined   Abnormal ECG   When compared with ECG of 29-JAN-2021 02:06,   T wave inversion no longer evident in Inferior leads     Dopplers:  Summary     No evidence of superficial or deep venous thrombosis in both lower    extremities. Incidental note is made of Baker's cyst on the right .      The patient was transitioned from heparin to Eliquis  Discharge planning initiated    Past Medical History:   has a past medical history of Acute pulmonary embolism ([address] COPD (chronic obstructive pulmonary disease) ([address] Disease of blood and blood forming organ, Elevated blood pressure readings, Hypertension, and Troponin level elevated. Social History:   reports that she has never smoked. She has never used smokeless tobacco. She reports current alcohol use. She reports that she does not use drugs. Family History:   Family History   Problem Relation Age of Onset    Cancer Mother     Kidney Disease Father        Review of Systems:     Review of Systems   Constitutional:  Positive for activity change (Improved) and fatigue (Exercise capacity, for diminished but improving). Respiratory:  Positive for shortness of breath (Less Dyspnea on exertion). Negative for cough and chest tightness. Cardiovascular:  Positive for leg swelling (Chronic). Negative for chest pain and palpitations. Gastrointestinal:  Negative for abdominal pain, nausea and vomiting. Genitourinary:  Negative for flank pain and hematuria. Physical Examination:        Vitals  /66   Pulse 94   Temp 97.6 °F (36.4 °C) (Temporal)   Resp 25   Ht 5' 2\" (1.575 m)   Wt 257 lb (116.6 kg)   SpO2 96%   BMI 47.01 kg/m²   Temp (24hrs), Av.8 °F (36.6 °C), Min:97.5 °F (36.4 °C), Max:98.7 °F (37.1 °C)    No results for input(s): POCGLU in the last 72 hours. Physical Exam  Constitutional:       General: She is not in acute distress. Appearance: She is not diaphoretic. HENT:      Head: Normocephalic and atraumatic. Eyes:      General: No scleral icterus. Neck:      Thyroid: No thyromegaly. Vascular: No JVD. Trachea: No tracheal deviation. Cardiovascular:      Rate and Rhythm: Normal rate and regular rhythm. Heart sounds: Normal heart sounds. No murmur heard. Pulmonary:      Effort: Pulmonary effort is normal. No respiratory distress. Breath sounds: Normal breath sounds. No wheezing or rales. Comments: Airflow reduced  No retractions  No accessory muscle use  No cyanosis      Abdominal:      General: Bowel sounds are normal. There is no distension. Palpations: Abdomen is soft.    Musculoskeletal:         General: No tenderness. Cervical back: Neck supple. Right lower leg: Edema present. Left lower leg: Edema present. Comments: 1-2/4 edema at the ankles   Skin:     General: Skin is warm and dry. Neurological:      Mental Status: She is alert and oriented to person, place, and time. Medications: Allergies:  No Known Allergies    Current Meds:   Scheduled Meds:    apixaban  10 mg Oral BID    Followed by    Sergey Gary ON 3/24/2023] apixaban  5 mg Oral BID    sodium chloride flush  5-40 mL IntraVENous 2 times per day    aspirin  81 mg Oral Daily    sodium chloride  80 mL IntraVENous Once     Continuous Infusions:    sodium chloride       PRN Meds: sodium chloride flush, sodium chloride, potassium chloride **OR** potassium alternative oral replacement **OR** potassium chloride, magnesium sulfate, ondansetron **OR** ondansetron, polyethylene glycol, acetaminophen **OR** acetaminophen, sodium chloride flush    Data:     I/O (24Hr):     Intake/Output Summary (Last 24 hours) at 3/17/2023 1245  Last data filed at 3/17/2023 0634  Gross per 24 hour   Intake 471.9 ml   Output 950 ml   Net -478.1 ml       Labs:  Hematology:  Recent Labs     03/15/23  2040 03/17/23  0336   WBC 7.9 9.4   RBC 3.83* 3.53*   HGB 12.0 11.0*   HCT 36.7 33.6*   MCV 95.8 95.2   MCH 31.3 31.2   MCHC 32.7 32.7   RDW 13.4 13.4    226   MPV 10.7 10.8   INR 1.0 1.0     Chemistry:  Recent Labs     03/15/23  2040 03/17/23  0336    135   K 3.4* 3.7    102   CO2 23 23   GLUCOSE 105* 117*   BUN 18 11   CREATININE 1.00* 0.90   ANIONGAP 14 10   LABGLOM 59* >60   CALCIUM 9.3 8.9   TROPHS 14  --      Recent Labs     03/16/23  0436   CHOL 176   HDL 56   LDLCHOLESTEROL 106   CHOLHDLRATIO 3.1   TRIG 71     ABG:No results found for: POCPH, PHART, PH, POCPCO2, CDQ6YKK, PCO2, POCPO2, PO2ART, PO2, POCHCO3, ALI5YQO, HCO3, NBEA, PBEA, BEART, BE, THGBART, THB, GKG8NSX, JFBX3YWU, P1WILNJF, O2SAT, FIO2  No results found for: SPECIAL  No results found for: CULTURE    Radiology:  CT CHEST PULMONARY EMBOLISM W CONTRAST    Result Date: 3/15/2023  Massive burden of pulmonary embolism with pulmonary emboli noted from main pulmonary artery to segmental and subsegmental branches with associated right-sided cardiac strain. Cholelithiasis with no signs of cholecystitis. 18 mm right adrenal nodule likely an adenoma. Critical results were called by Dr. Adilene Miller MD to Dr. Pau Abraham on 3/15/2023 at 22:20.        Assessment:        Primary Problem  Acute massive pulmonary embolism [facility])    Active Hospital Problems    Diagnosis Date Noted    Baker's cyst of knee, right [M71.21] 03/17/2023     Priority: Medium    Acute massive pulmonary embolism ([address] [I26.99] 03/16/2023     Priority: Medium    Cholelithiasis without cholecystitis [K80.20] 03/16/2023     Priority: Medium    Adrenal nodule ([facility] right side [E27.8] 03/16/2023     Priority: Medium    Hypokalemia [E87.6] 03/15/2023     Priority: Medium    Chronic renal disease, stage III [facility]) [034270] [N18.30] 06/07/2022     Priority: Medium    Hypercoagulable state ([address] [D68.59] 03/07/2022    Essential hypertension [I10] 03/04/2021    BMI 45.0-49.9, adult ([facility] [Z68.42] 02/04/2021    Anemia, normocytic normochromic [D64.9] 01/04/2021    Obesity due to excess calories without serious comorbidity [E66.09]          Plan:        Heparin to be transitioned to Eliquis  Blood Pressure - Monitor and control  Anemia w/u on outpatient basis is suggested   Electrolyte replacement as needed  Vascular eval & f/u as scheduled   EKOS not indicated   Pulmonary eval & f/u as scheduled   Outpatient sleep study to be arranged  Check bun and creatinine  Avoid nephrotoxins  Outpatient evaluation and surveillance regarding adrenal adenoma  Risk factor management / weight loss   Cholelithiasis appears to be stable/asymptomatic: Defer further evaluation at this time  Discharge planning  Will discharge when arrangements complete and ok with other services.   Follow-up with PCP in one week, Susi Blackwood MD  Notify PCP of discharge     IP CONSULT TO VASCULAR SURGERY  IP CONSULT TO PULMONOLOGY  IP CONSULT TO INTERNAL MEDICINE  IP CONSULT TO PULMONOLOGY  IP CONSULT TO [facility] [address], DO  3/17/2023  12:45 PM

## 2023-03-17 NOTE — PROGRESS NOTES
Pulmonary Critical Care Progress Note  Gilles Salamanca MD     Patient seen for the follow up of  Acute massive pulmonary embolism ([address]     Subjective:  She had uneventful night. She is off oxygen. She denies shortness of breath, cough or chest pain. She is on heparin drip. She had jobst stockings delivered. Examination:  Vitals: /66   Pulse 94   Temp 97.6 °F (36.4 °C) (Temporal)   Resp 25   Ht 5' 2\" (1.575 m)   Wt 257 lb (116.6 kg)   SpO2 96%   BMI 47.01 kg/m²   General appearance: alert and cooperative with exam  Neck: No JVD  Lungs: good air exchange, no crackles or wheezing  Heart: regular rate and rhythm, S1, S2 normal, no gallop  Abdomen: Soft, non tender, + BS  Extremities: no cyanosis or clubbing. Trace BLE edema    LABs:  CBC:   Recent Labs     03/15/23  2040 03/17/23  0336   WBC 7.9 9.4   HGB 12.0 11.0*   HCT 36.7 33.6*    226     BMP:   Recent Labs     03/15/23  2040 03/17/23  0336    135   K 3.4* 3.7   CO2 23 23   BUN 18 11   CREATININE 1.00* 0.90   LABGLOM 59* >60   GLUCOSE 105* 117*     PT/INR:   Recent Labs     03/15/23  2040 03/17/23  0336   PROTIME 13.5 13.6   INR 1.0 1.0     APTT:  Recent Labs     03/15/23  2040   APTT 29.9     Radiology:  CTA chest 3/15/2023  Massive burden of pulmonary embolism with pulmonary emboli noted from main   pulmonary artery to segmental and subsegmental branches with associated   right-sided cardiac strain. Cholelithiasis with no signs of cholecystitis.        18 mm right adrenal nodule likely an adenoma       Impression:  Acute hypoxic respiratory insufficiency  Acute pulmonary emboli, massive clot burden with evidence of right heart strain  8 mm right adrenal nodule, likely adenoma  Suspected obstructive sleep apnea/Obesity  History of PE in January 2021  CKD, HLD, HTN    Recommendations:  Oxygen via nasal cannula if needed to keep SPO2 90% or greater   Incentive spirometry every hour while awake   Vascular surgery following, no EKOS  Bilateral lower extremity jobst stockings Dopplers  Echo: EF 65%, RVSP 47 mmHg  BLE dopplers negative   DVT prophylaxis, heparin drip, transition to Eliquis per vascular   Sleep apnea evaluation as an outpatient  Discharge planning   Will follow with you    Electronically signed by     Kylah Ritter MD on 3/17/2023 at 2:56 PM  Pulmonary Critical Care and Sleep Medicine,  [facility]  Cell: [phone]  Office: [phone]

## 2023-03-17 NOTE — TELEPHONE ENCOUNTER
Patient called on 3/15 and stated that she was having SOB while walking in the store. Patient stated that it felt like the same from the last time she had a blood clot. I informed the patient to go to the ER ASAP. Patient stated that she was going to call her daughter and that she would go.

## 2023-03-17 NOTE — PROGRESS NOTES
VASCULAR SURGERY  PROGRESS NOTE      3/17/2023 1:24 PM  Subjective:   Admit Date: 3/15/2023  PCP: Tequila Brower MD    Chief Complaint   Patient presents with    Shortness of Breath     X 1 week. Pts pcp sent her to ed for concern for blood clots in lungs. Pt states she had blood clots 2 years ago. Pt states she is no longer on blood thinners     Interval History: No complaints. Feeling better. SaO2 95 to 98% on room air. Stockings fitted. Diet: ADULT DIET; Regular; Low Fat/Low Chol/High Fiber/2 gm Na    Medications:   Scheduled Meds:   apixaban  10 mg Oral BID    Followed by    [facility]yn [facility] ON 3/24/2023] apixaban  5 mg Oral BID    sodium chloride flush  5-40 mL IntraVENous 2 times per day    aspirin  81 mg Oral Daily    sodium chloride  80 mL IntraVENous Once     Continuous Infusions:   sodium chloride           Labs:   CBC:   Recent Labs     03/15/23  2040 03/17/23  0336   WBC 7.9 9.4   HGB 12.0 11.0*    226     BMP:    Recent Labs     03/15/23  2040 03/17/23  0336    135   K 3.4* 3.7    102   CO2 23 23   BUN 18 11   CREATININE 1.00* 0.90   GLUCOSE 105* 117*     Hepatic: No results for input(s): AST, ALT, ALB, BILITOT, ALKPHOS in the last 72 hours. Troponin: Invalid input(s): TROPONIN  BNP: No results for input(s): BNP in the last 72 hours.   Lipids:   Recent Labs     03/16/23  0436   CHOL 176   HDL 56     INR:   Recent Labs     03/15/23  2040 03/17/23  0336   INR 1.0 1.0       Objective:   Vitals: /66   Pulse 94   Temp 97.6 °F (36.4 °C) (Temporal)   Resp 25   Ht 5' 2\" (1.575 m)   Wt 257 lb (116.6 kg)   SpO2 96%   BMI 47.01 kg/m²   General appearance: alert, cooperative and no distress  Mental Status: oriented to person, place and time with normal affect  Neck: good carotid pulses, no JVD  Lungs: clear to auscultation bilaterally, normal effort  Heart: regular rate and rhythm, no murmur,  Abdomen: soft, obese, non-tender, non-distended, bowel sounds present all four quadrants, no masses, hepatomegaly, splenomegaly or aortic enlargement  Extremities: Mild edema, no erythema or tenderness in the calves  Skin: no gross lesions, rashes, or induration    Assessment:   77-year-old female with acute bilateral pulmonary emboli (second episode, unprovoked)  No evidence of right heart strain by echo  No evidence of lower extremity DVT    Patient Active Problem List:     Bilateral pulmonary embolism ([address]     Obesity due to excess calories without serious comorbidity     Anemia, normocytic normochromic     Hyperthyroidism     Lymphedema of both lower extremities     BMI 45.0-49.9, adult ([address]     Essential hypertension     Current mild episode of major depressive disorder without prior episode ([address]     Vitamin D deficiency     Hypercoagulable state ([address]     Chronic renal disease, stage III ([address] [422455]     Mixed hyperlipidemia     Prediabetes     Primary osteoarthritis of both knees     Hypokalemia     Acute massive pulmonary embolism (HCC)     Cholelithiasis without cholecystitis     Adrenal nodule (HCC) right side     Baker's cyst of knee, right      Plan:   Transition to Eliquis today which she will need to be on long-term  Discontinue heparin after 2 hours  Wear Jobst stockings on a daily basis help with her lower extremity swelling  Ok for discharge from vascular standpoint    Electronically signed by Maya Mathwes MD on 3/17/2023 at 1:24 PM

## 2023-03-17 NOTE — PLAN OF CARE
Problem: Chronic Conditions and Co-morbidities  Goal: Patient's chronic conditions and co-morbidity symptoms are monitored and maintained or improved  Outcome: Progressing  Flowsheets (Taken 3/16/2023 2000)  Care Plan - Patient's Chronic Conditions and Co-Morbidity Symptoms are Monitored and Maintained or Improved: Monitor and assess patient's chronic conditions and comorbid symptoms for stability, deterioration, or improvement     Problem: Discharge Planning  Goal: Discharge to home or other facility with appropriate resources  Outcome: Progressing  Flowsheets (Taken 3/16/2023 2000)  Discharge to home or other facility with appropriate resources: Identify barriers to discharge with patient and caregiver     Problem: ABCDS Injury Assessment  Goal: Absence of physical injury  Outcome: Progressing     Problem: Safety - Adult  Goal: Free from fall injury  Outcome: Progressing     Problem: Pain  Goal: Verbalizes/displays adequate comfort level or baseline comfort level  Outcome: Progressing     Problem: Skin/Tissue Integrity  Goal: Absence of new skin breakdown  Description: 1. Monitor for areas of redness and/or skin breakdown  2. Assess vascular access sites hourly  3. Every 4-6 hours minimum:  Change oxygen saturation probe site  4. Every 4-6 hours:  If on nasal continuous positive airway pressure, respiratory therapy assess nares and determine need for appliance change or resting period.   Outcome: Progressing

## 2023-03-17 NOTE — DISCHARGE SUMMARY
[facility]  Office: [address], DO, Kathleen Ch, DO, Marianela Madhu, DO, Gonzalo Garcia Blood, DO, Fanta Valladares MD, Lizzie Fontana MD, Yvone Phalen, MD, Harpal Pagan MD,  Homer Hashimoto, MD, Oseas Mills MD, Lennie Sidhu, DO, Sussy Pearl MD,  Maria Eugenia Rosas MD, Maya Ellison MD, Musa Helm, DO, Ragini Pike MD, Alisa Arroyo MD, Trice Mccormick, DO, Ave Desai MD, Turner Jasso MD, Santino London MD, Viky Bhatia MD, Jade Gillette MD, Zenaida Webb DO, Berto Barr MD, Akhil German MD, Yara Nava, CNP,  Nazia Perkins, CNP, Sarah Cornelius, CNP, Michoacano Arce, CNP,  Karsten Mason, DNP, Jo Ann Arroyo, CNP, José Miguel Bowie, CNP, Reggie Rosales, CNP, Skylar Erwin, CNP, Kaitlin Rojas, CNP, Mala Jones PA-C, Atiya Parisi, CNS, Kofi Riley, CNP, Mg Corey, CNP           [facility]    Discharge Summary    Patient ID: Abelardo Martinez  :  1950   MRN: [id number]     ACCOUNT:  [de-identified]   Patient's PCP: Beatriz Suarez MD  Admit Date: 3/15/2023   Discharge Date: 3/17/2023    Discharge Physician: Femi Mei DO     The patient was seen and examined on day of discharge and this discharge summary is in conjunction with any daily progress note from day of discharge.     Active Discharge Diagnoses:     Primary Problem  Acute massive pulmonary embolism [facility])      Hospital Problems  Active Hospital Problems    Diagnosis Date Noted    Baker's cyst of knee, right [M71.21] 2023     Priority: Medium    Acute respiratory failure with hypoxia ([address] [J96.01] 2023     Priority: Medium    Acute massive pulmonary embolism ([facility] [I26.99] 2023     Priority: Medium    Cholelithiasis without cholecystitis [K80.20] 03/16/2023     Priority: Medium    Adrenal nodule ([facility] right side [E27.8] 03/16/2023     Priority: Medium    Hypokalemia [E87.6] 03/15/2023     Priority: Medium    Chronic renal disease, stage III ([facility] [212696] [N18.30] 06/07/2022     Priority: Medium    Hypercoagulable state ([facility] [D68.59] 03/07/2022    Essential hypertension [I10] 03/04/2021    BMI 45.0-49.9, adult ([facility] [Z68.42] 02/04/2021    Anemia, normocytic normochromic [D64.9] 01/04/2021    Obesity due to excess calories without serious comorbidity [E66.09]          Hospital Course:     Brief History  As documented in the medical record:  Yolanda Wisdom is a 74 y.o. Non- / non  female who presents with Shortness of Breath (X 1 week. Pts pcp sent her to ed for concern for blood clots in lungs. Pt states she had blood clots 2 years ago. Pt states she is no longer on blood thinners)   and is admitted to the hospital for the management of Acute massive pulmonary embolism ([facility] Patient presented to the ER with complaints of shortness of breath. Greater the patient she is felt short of breath while ambulating for about 2 weeks. She denies chest pain, fever or chills. She has a history of a PE approximately 2 years ago. Per chart review in January 21 the patient was admitted with bilateral pulmonary embolus. PE at that time was thought to be related to a 3-hour road trip during the holidays and an extended amount of time caring for her father and sitting by his bedside. Echo at that time showed an EF of 60%. Bilateral venous Dopplers of the lower extremities were negative for DVT. At the time of discharge she was prescribed Xarelto and required home oxygen for a little while. She states she has been off the Xarelto and oxygen for a while but but she is not sure how long. Today on arrival to the ER she is slightly tachycardic at 105 but she is not hypoxic.   A CTA of the chest shows a massive burden of pulmonary embolus with pulmonary emboli noted from the main pulmonary artery to the segmental and subsegmental branches with associated right heart strain. Her BUN and creatinine are 18/1.00 which is consistent with her creatinines in the past. \"     The intitial assessment and plan included:  \"         * (Principal) Acute massive pulmonary embolism ([address] 3/16/2023 Yes       Chronic renal disease, stage III [facility]) [373309] 3/15/2023 Yes     Hypokalemia 3/15/2023 Yes     Obesity due to excess calories without serious comorbidity 3/15/2023 Yes     Anemia, normocytic normochromic 3/15/2023 Yes     Essential hypertension 3/15/2023 Yes         Plan:   Patient status inpatient in the  Medical ICU   Acute massive PE with right heart strain per CT  Stat echo in a.m. Vascular and pulmonary following  Heparin drip per PE protocol  Oxygen maintain sat greater than 90%   Check lipids    CKD 3  Avoid nephrotoxic agents  Repeat BMP in a.m. Hypokalemia  Electrolyte replacement ordered   Obesity  Patient is currently working on weight loss for an upcoming knee surgery and reports a 40 pound weight loss over the last year   Anemia  Stable   Essential hypertension  Hold home dose of hydrochlorothiazide  Monitor BP  When she was discharged 2 years ago she was sent home with prescriptions for Coreg and lisinopril those were later discontinued \"      Additional database has included:  As documented in the patient's record she was admitted in 2021 with a pulmonary embolism after a road trip  She states that she was on anticoagulation for less than 1 year  Family history is negative for coagulopathy or hypercoagulable states     Echo   Summary   Left ventricle is normal in size   Global left ventricular systolic function is normal   Estimated ejection fraction is 65 % . Left atrium is mildly dilated. Mild tricuspid regurgitation. Mild pulmonary hypertension. No pericardial effusion seen. Normal aortic root dimension. CTA  Impression:    Massive burden of pulmonary embolism with pulmonary emboli noted from main   pulmonary artery to segmental and subsegmental branches with associated   right-sided cardiac strain. Cholelithiasis with no signs of cholecystitis. 18 mm right adrenal nodule likely an adenoma. EKG:  Sinus tachycardia   Cannot rule out Anterior infarct , age undetermined   Abnormal ECG   When compared with ECG of 29-JAN-2021 02:06,   T wave inversion no longer evident in Inferior leads      Dopplers:  Summary     No evidence of superficial or deep venous thrombosis in both lower    extremities. Incidental note is made of Baker's cyst on the right . The patient was transitioned from heparin to Eliquis  Discharge planning initiated       Discharge plan:     Heparin to be transitioned to Eliquis  Blood Pressure - Monitor and control  Anemia w/u on outpatient basis is suggested   Electrolyte replacement as needed  Vascular eval & f/u as scheduled   EKOS not indicated   Pulmonary eval & f/u as scheduled   Outpatient sleep study to be arranged  Check bun and creatinine  Avoid nephrotoxins  Outpatient evaluation and surveillance regarding adrenal adenoma  Risk factor management / weight loss   Cholelithiasis appears to be stable/asymptomatic: Defer further evaluation at this time  Discharge planning  Will discharge when arrangements complete and ok with other services. Follow-up with PCP in one week, Jae Harrell MD  Notify PCP of discharge       Home Oxygen Evaluation     Home Oxygen Evaluation completed.      Patient is on 2 liters per minute via Nasal cannula  Resting SpO2 = 95%  Resting SpO2 on room air = 87%     SpO2 on room air with exercise = 87%  SpO2 on oxygen as above with exercise = n/a%    Home O2 orders placed     Face to face encounter today indicate the requirement of DME order of  Oxygen for the diagnosis of the  Hypoxia Indication and side effects of treatment had been addressed with pt , pt agreeable to the current plan of using the DME     DC planning 33 minutes plus    No discharge procedures on file. Significant Diagnostic Studies:     Labs / Micro:  Labs:  Hematology:  Recent Labs     03/15/23  2040 03/17/23  0336   WBC 7.9 9.4   RBC 3.83* 3.53*   HGB 12.0 11.0*   HCT 36.7 33.6*   MCV 95.8 95.2   MCH 31.3 31.2   MCHC 32.7 32.7   RDW 13.4 13.4    226   MPV 10.7 10.8   INR 1.0 1.0     Chemistry:  Recent Labs     03/15/23  2040 03/17/23  0336    135   K 3.4* 3.7    102   CO2 23 23   GLUCOSE 105* 117*   BUN 18 11   CREATININE 1.00* 0.90   ANIONGAP 14 10   LABGLOM 59* >60   CALCIUM 9.3 8.9   TROPHS 14  --      Recent Labs     03/16/23  0436   CHOL 176   HDL 56   LDLCHOLESTEROL 106   CHOLHDLRATIO 3.1   TRIG 71         Radiology:    CT CHEST PULMONARY EMBOLISM W CONTRAST    Result Date: 3/15/2023  EXAMINATION: CTA OF THE CHEST 3/15/2023 9:40 pm TECHNIQUE: CTA of the chest was performed after the administration of intravenous contrast.  Multiplanar reformatted images are provided for review. MIP images are provided for review. Automated exposure control, iterative reconstruction, and/or weight based adjustment of the mA/kV was utilized to reduce the radiation dose to as low as reasonably achievable. COMPARISON: None. HISTORY: ORDERING SYSTEM PROVIDED HISTORY: RO PE TECHNOLOGIST PROVIDED HISTORY: RO PE Decision Support Exception - unselect if not a suspected or confirmed emergency medical condition->Emergency Medical Condition (MA) Reason for Exam: SOB x 1 week. Hx prior PE, not currently on blood thinners. Isovue 370 - 100ml FINDINGS: Pulmonary Arteries: Pulmonary arteries are adequately opacified for evaluation. There is extensive burden pulmonary emboli noted the main pulmonary artery extending into the segmental and subsegmental branches of both lungs. Findings are suggestive of massive burden of pulmonary embolism. There is associated right-sided cardiac strain.  Mediastinum: No evidence of mediastinal lymphadenopathy. The heart and pericardium demonstrate no acute abnormality. There is no acute abnormality of the thoracic aorta. Lungs/pleura: The lungs are without acute process. No focal consolidation or pulmonary edema. No evidence of pleural effusion or pneumothorax. Upper Abdomen: Cholelithiasis with no signs of cholecystitis. 18 mm right adrenal nodule likely an adenoma. Soft Tissues/Bones: No acute bone or soft tissue abnormality. Massive burden of pulmonary embolism with pulmonary emboli noted from main pulmonary artery to segmental and subsegmental branches with associated right-sided cardiac strain. Cholelithiasis with no signs of cholecystitis. 18 mm right adrenal nodule likely an adenoma. Critical results were called by Dr. Savanah Styles MD to Dr. Matias Brumfield on 3/15/2023 at 22:20. VL DUP LOWER EXTREMITY VENOUS BILATERAL    Result Date: 3/16/2023    [facility]  Vascular Lower Extremities DVT Study Procedure   Patient Name  [facility]      Date of Study           03/16/2023                [facility]   Date of Birth 1950  Gender                  Female   Age           74 year(s)  Race                       Room Number   2032        Height:                 61.81 inch, 157 cm   Corporate ID  [id number]    Weight:                 258 pounds, 117 kg  #   Patient [place]  [de-identified]   BSA:        2.13 m^2    BMI:        47.48 kg/m^2  #   MR #          [id number]     Sonographer             Lou Coreas   Accession #   [id number]  Interpreting Physician  Olman Valencia   Referring                 Referring Physician     Daphene Chew  Nurse  Practitioner  Procedure Type of Study:   Veins: Lower Extremities DVT Study, Venous Scan Lower Bilateral.  Patient Status: In Patient. Technical Quality:Adequate visualization. Conclusions   Summary   No evidence of superficial or deep venous thrombosis in both lower  extremities.   Incidental note is made of Baker's cyst on the right . Signature   ----------------------------------------------------------------  Electronically signed by Lou Coreas(Sonographer) on  03/16/2023 09:54 AM  ----------------------------------------------------------------   ----------------------------------------------------------------  Electronically signed by Olman Valencia(Interpreting physician)  on 03/16/2023 10:32 PM  ----------------------------------------------------------------  Findings:   Right Impression:                    Left Impression:  The common femoral, femoral,         The common femoral, femoral,  popliteal, tibials and saphenous     popliteal, tibials and saphenous  veins are compressible with normal   veins are compressible with normal  doppler responses. doppler responses. Incidental finding of a Baker's cyst Thigh, calf, and ankle edema noted. measuring 1.36 x 1.44 cms   Thigh, calf and ankle edema noted. Velocities are measured in cm/s ; Diameters are measured in cm Right Lower Extremities DVT Study Measurements Right 2D Measurements +------------------------------------+----------+---------------+----------+ ! Location                            ! Visualized! Compressibility! Thrombosis! +------------------------------------+----------+---------------+----------+ ! Common Femoral                      !Yes       ! Yes            ! None      ! +------------------------------------+----------+---------------+----------+ ! Prox Femoral                        !Yes       ! Yes            ! None      ! +------------------------------------+----------+---------------+----------+ ! Mid Femoral                         !Yes       ! Yes            ! None      ! +------------------------------------+----------+---------------+----------+ ! Dist Femoral                        !Yes       ! Yes            ! None      ! +------------------------------------+----------+---------------+----------+ ! Deep Femoral            !Yes       !Yes            !None      ! +------------------------------------+----------+---------------+----------+ !Popliteal                           !Yes       !Yes            !None      ! +------------------------------------+----------+---------------+----------+ !Sapheno Femoral Junction            !Yes       !Yes            !None      ! +------------------------------------+----------+---------------+----------+ !PTV                                 !Yes       !Yes            !None      ! +------------------------------------+----------+---------------+----------+ !Peroneal                            !Yes       !Yes            !None      ! +------------------------------------+----------+---------------+----------+ !Gastroc                             !Yes       !Yes            !None      ! +------------------------------------+----------+---------------+----------+ !GSV Thigh                           !Yes       !Yes            !None      ! +------------------------------------+----------+---------------+----------+ !GSV Knee                            !Yes       !Yes            !None      ! +------------------------------------+----------+---------------+----------+ !GSV Ankle                           !Yes       !Yes            !None      ! +------------------------------------+----------+---------------+----------+ !SSV                                 !Yes       !Yes            !None      ! +------------------------------------+----------+---------------+----------+ Right Doppler Measurements +---------------------------+------+------+--------------------------------+ !Location                   !Signal!Reflux!Reflux (msec)                   ! +---------------------------+------+------+--------------------------------+ !Common Femoral             !Phasic!      !                                ! +---------------------------+------+------+--------------------------------+ !Prox Femoral            !Phasic!      !                                ! +---------------------------+------+------+--------------------------------+ ! Popliteal                  !Phasic!      !                                ! +---------------------------+------+------+--------------------------------+ Left Lower Extremities DVT Study Measurements Left 2D Measurements +------------------------------------+----------+---------------+----------+ ! Location                            ! Visualized! Compressibility! Thrombosis! +------------------------------------+----------+---------------+----------+ ! Common Femoral                      !Yes       ! Yes            ! None      ! +------------------------------------+----------+---------------+----------+ ! Prox Femoral                        !Yes       ! Yes            ! None      ! +------------------------------------+----------+---------------+----------+ ! Mid Femoral                         !Yes       ! Yes            ! None      ! +------------------------------------+----------+---------------+----------+ ! Dist Femoral                        !Yes       ! Yes            ! None      ! +------------------------------------+----------+---------------+----------+ ! Deep Femoral                        !Yes       ! Yes            ! None      ! +------------------------------------+----------+---------------+----------+ ! Popliteal                           !Yes       ! Yes            ! None      ! +------------------------------------+----------+---------------+----------+ ! Sapheno Femoral Junction            ! Yes       ! Yes            ! None      ! +------------------------------------+----------+---------------+----------+ ! PTV                                 ! Yes       ! Yes            ! None      ! +------------------------------------+----------+---------------+----------+ ! Peroneal                            !Yes       ! Yes            ! None      ! +------------------------------------+----------+---------------+----------+ ! Gastroc                             ! Yes       ! Yes            ! None      ! +------------------------------------+----------+---------------+----------+ ! GSV Thigh                           ! Yes       ! Yes            ! None      ! +------------------------------------+----------+---------------+----------+ ! GSV Knee                            ! Yes       ! Yes            ! None      ! +------------------------------------+----------+---------------+----------+ ! GSV Ankle                           ! Yes       ! Yes            ! None      ! +------------------------------------+----------+---------------+----------+ ! SSV                                 ! Yes       ! Yes            ! None      ! +------------------------------------+----------+---------------+----------+ Left Doppler Measurements +---------------------------+------+------+--------------------------------+ ! Location                   ! Signal!Reflux! Reflux (msec)                   ! +---------------------------+------+------+--------------------------------+ ! Common Femoral             !Phasic!      !                                ! +---------------------------+------+------+--------------------------------+ ! Prox Femoral               !Phasic!      !                                ! +---------------------------+------+------+--------------------------------+ ! Popliteal                  !Phasic!      !                                ! +---------------------------+------+------+--------------------------------+        Consultations:    Consults:     Final Specialist Recommendations/Findings:   IP CONSULT TO VASCULAR SURGERY  IP CONSULT TO PULMONOLOGY  IP CONSULT TO INTERNAL MEDICINE  IP CONSULT TO PULMONOLOGY  IP CONSULT TO VASCULAR SURGERY        Discharged Condition:    Stable     Disposition: skilled nursing facility     Physician Follow Up:   [facility]  [address]  [address]  [place] [id number]  [phone]    Follow up  Please call when you arrive home so they can deliver the rest of your oxygen equipment.       Activity:  activity as tolerated    Diet:  cardiac diet     Discharge Medications:      Medication List        START taking these medications      apixaban starter pack 5 MG Tbpk tablet  Commonly known as: Eliquis DVT/PE Starter Pack  Take 1 tablet by mouth See Admin Instructions            CONTINUE taking these medications      aspirin 81 MG EC tablet  Commonly known as: Aspirin Adult Low Strength  Take 1 tablet by mouth daily     Blood Pressure Kit  Use to check BP twice a day     hydroCHLOROthiazide 25 MG tablet  Commonly known as: HYDRODIURIL  TAKE ONE TABLET BY MOUTH EVERY MORNING     OCUVITE EYE HEATLH GUMMIES PO     Vitamin D3 50 MCG (2000 UT) Tabs  Take 1 tablet by mouth daily               Where to Get Your Medications        These medications were sent to [id number] - [place], [place] - [address] -  [phone] - F [phone]  [address] [place] [address]      Phone: [phone]   apixaban starter pack 5 MG Tbpk tablet         Time Spent on discharge is  33 mins in patient examination, evaluation, counseling, medication reconciliation, discharge plan and follow up.    Electronically signed by   Keagan Nguyễn DO  3/17/2023  6:22 PM      Thank you Dr. Conchita Raza MD for the opportunity to be involved in this patient's care.

## 2023-03-17 NOTE — PROGRESS NOTES
[facility]  PROGRESS NOTE    Room # 2032/2032-01   Name: Alex Maki            Yarsani: [place]     Reason for visit: Routine    I visited the patient. Admit Date & Time: 3/15/2023  8:10 PM    Assessment:  Alex Maki is a 74 y.o. female in the hospital because \"pulmonary embolism\". Upon entering the room pt was sitting up in bed eating breakfast      Intervention:  I introduced myself and my title as  I offered space for pt  to express feelings, needs, and concerns and provided a ministry presence. Pt did not express any needs or concerns to this writer. This writer engaged in brief conversation with pt. Pt appeared to be coping    Outcome:  Pt expressed gratitude for this visit    Plan:  Chaplains will remain available to offer spiritual and emotional support as needed. Electronically signed by Vale Willett on 3/17/2023 at 8:53 AM.  Kimberly       03/17/23 9025   Encounter Summary   Service Provided For: Patient   Referral/Consult From: [facility]   Support System Children   Last Encounter  03/17/23   Complexity of Encounter Low   Begin Time 0840   End Time  0845   Total Time Calculated 5 min   Encounter    Type Initial Screen/Assessment   Assessment/Intervention/Outcome   Assessment Calm;Coping   Intervention Sustaining Presence/Ministry of presence; Active listening   Outcome Engaged in conversation;Coping;Expressed Gratitude

## 2023-03-17 NOTE — PLAN OF CARE
[facility]  Office: [address], DO, Carrington Mcneil, DO, Nayely Garza, DO, Dinora Ahumada, DO, Vik Bolton MD, Cheryl Salvador MD, Princess Basil MD, Tory Whitt MD,  Jolynn Carter MD, Indiana Fuentes MD, Ladell Scheuermann, DO, Neil Arora MD,  Flor Ngo, DO, Deidre Donaldson MD, Matt Zelaya MD, Hemant Chávez, DO, Nataly Ingram MD, Ladonna Storm MD, Susan Overton, DO, María Elena Campbell MD, Magaly Byrne MD, Zina Moore MD, Derek Colvin MD, Britany Will MD, Jamra Robbins DO, Chip Roberts MD, Radha Silva MD, Rina Russo, [facility],  Xochilt Jaimes, CNP, Halle Gutierrez, CNP, Xi Murillo, CNP,  Carie Potter, [facility], Luke Patterson, CNP, Sara Vazquez, CNP, Tushar Cox, CNP, Arleen Alpers, [facility], [facility] Anika, [facility], Jerene Oppenheim, PA-C, Jennie Nation, CNS, James Monterroso, CNP, Andra Phillip, CNP         [address]    Second Visit Note  For more detailed information please refer to the progress note of the day      3/17/2023    4:06 PM    Name:   Ishan Mcfarland  MRN:     [id number]     Acct:      [de-identified]   Room:   2032/2032-01   Day:  1  Admit Date:  3/15/2023  8:10 PM    PCP:   Katharina Moon MD  Code Status:  Full Code    Patient has been seen    Home O2 evaluation has been completed       Patient is on 2 liters per minute via Nasal cannula  Resting SpO2 = 95%  Resting SpO2 on room air = 87%     SpO2 on room air with exercise = 87%  SpO2 on oxygen as above with exercise = n/a%    CURRENT MEDS:   apixaban (ELIQUIS) tablet 10 mg, BID   Followed by  Trent Rakes ON 3/24/2023] apixaban (ELIQUIS) tablet 5 mg, BID  sodium chloride flush 0.9 % injection 5-40 mL, 2 times per day  sodium chloride flush 0.9 % injection 10 mL, PRN  0.9 % sodium chloride infusion, PRN  potassium chloride (KLOR-CON M) extended release tablet 40 mEq, PRN   Or  potassium bicarb-citric acid (EFFER-K) effervescent tablet 40 mEq, PRN   Or  potassium chloride 10 mEq/100 mL IVPB (Peripheral Line), PRN  magnesium sulfate 1000 mg in dextrose 5% 100 mL IVPB, PRN  ondansetron (ZOFRAN-ODT) disintegrating tablet 4 mg, Q8H PRN   Or  ondansetron (ZOFRAN) injection 4 mg, Q6H PRN  polyethylene glycol (GLYCOLAX) packet 17 g, Daily PRN  acetaminophen (TYLENOL) tablet 650 mg, Q6H PRN   Or  acetaminophen (TYLENOL) suppository 650 mg, Q6H PRN  aspirin EC tablet 81 mg, Daily  sodium chloride flush 0.9 % injection 10 mL, PRN  0.9 % sodium chloride bolus, Once        VITALS:  /66   Pulse 94   Temp 97.6 °F (36.4 °C) (Temporal)   Resp 25   Ht 5' 2\" (1.575 m)   Wt 257 lb (116.6 kg)   SpO2 96%   BMI 47.01 kg/m²     LABS:  Labs:  Hematology:  Recent Labs     03/15/23  2040 03/17/23  0336   WBC 7.9 9.4   RBC 3.83* 3.53*   HGB 12.0 11.0*   HCT 36.7 33.6*   MCV 95.8 95.2   MCH 31.3 31.2   MCHC 32.7 32.7   RDW 13.4 13.4    226   MPV 10.7 10.8   INR 1.0 1.0     Chemistry:  Recent Labs     03/15/23  2040 03/17/23  0336    135   K 3.4* 3.7    102   CO2 23 23   GLUCOSE 105* 117*   BUN 18 11   CREATININE 1.00* 0.90   ANIONGAP 14 10   LABGLOM 59* >60   CALCIUM 9.3 8.9   TROPHS 14  --      Recent Labs     03/16/23  0436   CHOL 176   HDL 56   LDLCHOLESTEROL 106   CHOLHDLRATIO 3.1   TRIG 71       PROBLEMS:  Principal Problem:    Acute massive pulmonary embolism (HCC)  Active Problems:    Chronic renal disease, stage III (HCC) [478513]    Hypokalemia    Cholelithiasis without cholecystitis    Adrenal nodule (HCC) right side    Baker's cyst of knee, right    Obesity due to excess calories without serious comorbidity    Anemia, normocytic normochromic    BMI 45.0-49.9, adult (HCC)    Essential hypertension    Hypercoagulable state ([facility] [address]  Resolved Problems:    * No resolved hospital problems.  *      UPDATED PLAN:  See current orders    Home O2 orders placed    Face to face encounter today indicate the requirement of DME order of  Oxygen for the diagnosis of the  Hypoxia Indication and side effects of treatment had been addressed with pt , pt agreeable to the current plan of using the DME    DC planning 33 minutes plus    IP CONSULT TO VASCULAR SURGERY  IP CONSULT TO PULMONOLOGY  IP CONSULT TO INTERNAL MEDICINE  IP CONSULT TO PULMONOLOGY  IP CONSULT TO [facility] [address], DO  3/17/2023  4:06 PM

## 2023-03-20 ENCOUNTER — CARE COORDINATION (OUTPATIENT)
Dept: CASE MANAGEMENT | Age: 73
End: 2023-03-20

## 2023-03-20 DIAGNOSIS — I26.99 ACUTE MASSIVE PULMONARY EMBOLISM (HCC): Primary | ICD-10-CM

## 2023-03-20 PROCEDURE — 1111F DSCHRG MED/CURRENT MED MERGE: CPT | Performed by: STUDENT IN AN ORGANIZED HEALTH CARE EDUCATION/TRAINING PROGRAM

## 2023-03-20 NOTE — CARE COORDINATION
Yes. Reviewed appropriate site of care based on symptoms and resources available to patient including: PCP  When to call 911. The patient agrees to contact the PCP office for questions related to their healthcare. Advance Care Planning:   Does patient have an Advance Directive: patient declined education. Medication reconciliation was performed with patient, who verbalizes understanding of administration of home medications. Medications reviewed, 1111F entered: yes    Was patient discharged with a pulse oximeter? no    Non-face-to-face services provided:  Obtained and reviewed discharge summary and/or continuity of care documents    Offered patient enrollment in the Remote Patient Monitoring (RPM) program for in-home monitoring:  did address at this time. .    Care Transitions 24 Hour Call    Do you have a copy of your discharge instructions?: Yes  Do you have all of your prescriptions and are they filled?: Yes  Have you been contacted by a [address]?: No  Have you scheduled your follow up appointment?: No  Do you feel like you have everything you need to keep you well at home?: Yes  Care Transitions Interventions         Discussed follow-up appointments. If no appointment was previously scheduled, appointment scheduling offered: Yes. Is follow up appointment scheduled within 7 days of discharge? No.    Follow Up  Future Appointments   Date Time Provider Nick Tolbert   6/15/2023  2:00 PM MD Louise Mathew   9/14/2023  2:00 PM Shaggy Steven MD [facility] AND [facility]OMEN'S [facility] [address] Transition Nurse provided contact information. Plan for follow-up call in 3-5 days based on severity of symptoms and risk factors. Plan for next call:  follow up on if PCP appointment made, if got Jobst stockings, offer RPM, any increase s/s of resp problems.     Chaya Singh RN

## 2023-03-21 ENCOUNTER — TELEPHONE (OUTPATIENT)
Dept: FAMILY MEDICINE CLINIC | Age: 73
End: 2023-03-21

## 2023-03-21 NOTE — TELEPHONE ENCOUNTER
Care Transitions Initial Follow Up Call    Outreach made within 2 business days of discharge: Yes    Patient: Katja Wilson Patient : 1950   MRN: [id number]  Reason for Admission: There are no discharge diagnoses documented for the most recent discharge. Discharge Date: 3/17/23       Spoke with: Jama Mao     Discharge department/facility: [facility]    TCM Interactive Patient Contact:  Was patient able to fill all prescriptions: Yes  Was patient instructed to bring all medications to the follow-up visit: Yes  Is patient taking all medications as directed in the discharge summary?  Yes  Does patient understand their discharge instructions: Yes  Does patient have questions or concerns that need addressed prior to 7-14 day follow up office visit: no    Scheduled appointment with PCP within 7-14 days    Follow Up  Future Appointments   Date Time Provider Nick Tolbert   6/15/2023  2:00 PM Oneida Oneal MD [facility]   2023  2:00 PM Oneida Oneal MD [facility] [address] [place], [place]

## 2023-03-23 ENCOUNTER — CARE COORDINATION (OUTPATIENT)
Dept: CASE MANAGEMENT | Age: 73
End: 2023-03-23

## 2023-03-23 NOTE — PROGRESS NOTES
Physician Progress Note      PATIENT:               Manish Wade  [facility] #:                  [id number]  :                       1950  ADMIT DATE:       3/15/2023 8:10 PM  [address] DATE:        3/17/2023 5:17 PM  RESPONDING  PROVIDER #:        Callum Cornejo DO          QUERY TEXT:    Patient admitted with a PE. Noted documentation of acute respiratory failure   in d/c summary on 3/17/23. In order to support the diagnosis of acute   respiratory failure, please include additional clinical indicators in your   documentation. Or please document if the diagnosis of acute respiratory   failure has been ruled out after further study. The medical record reflects the following:  Risk Factors:  PE, COPD  Clinical Indicators: Tachypnea with RR as high as 32, note of dyspnea with   exertion, note that \"The patient had increase SOB while putting on a hospital   gown and had a drop in O2 sat to 86%. The patient was placed on 2L NC\"  Treatment: supplemental o2    Thank you,  Navneet Reyes RN    Acute Respiratory Failure Clinical Indicators per [employer] Training Guide and   Quick Reference Guide:  pO2 < 60 mmHg or SpO2 (pulse oximetry) < 91% breathing room air  pCO2 > 50 and pH < 7.35  P/F ratio (pO2 / FIO2) < 300  pO2 decrease or pCO2 increase by 10 mmHg from baseline (if known)  Supplemental oxygen of 40% or more  Presence of respiratory distress, tachypnea, dyspnea, shortness of breath,   wheezing  Unable to speak in complete sentences  Use of accessory muscles to breathe  Extreme anxiety and feeling of impending doom  Tripod position  Confusion/altered mental status/obtunded  Options provided:  -- Acute Respiratory Failure as evidenced by, Please document evidence.   -- Acute Respiratory Failure ruled out after study  -- Acute Respiratory Failure ruled out after study and Chronic Respiratory   Failure confirmed  -- Other - I will add my own diagnosis  -- Disagree - Not applicable / Not valid  -- Disagree - Clinically unable to determine / Unknown  -- Refer to Clinical Documentation Reviewer    PROVIDER RESPONSE TEXT:    This patient is in acute respiratory failure as evidenced by hypoxia and a O2   sat of 86%    Query created by:  Katia Brooks on 3/22/2023 10:13 AM      Electronically signed by:  Karina Argueta DO 3/22/2023 10:49 PM

## 2023-03-23 NOTE — CARE COORDINATION
any barriers to care and/or understanding of plan of care after discharge. Discussed appropriate site of care based on symptoms and resources available to patient including: PCP  When to call 911. The patient agrees to contact the PCP office for questions related to their healthcare. Patients top risk factors for readmission: medical condition-PE  Interventions to address risk factors: Obtained and reviewed discharge summary and/or continuity of care documents       Care Transitions Subsequent and Final Call    Subsequent and Final Calls  Do you have any ongoing symptoms?: No  Have your medications changed?: No  Do you have any questions related to your medications?: No  Do you currently have any active services?: No  Do you have any needs or concerns that I can assist you with?: No  Care Transitions Interventions  Other Interventions:             Care Transition Nurse provided contact information for future needs. Plan for follow-up call in 7-10 days based on severity of symptoms and risk factors. Plan for next call:  follow up on PCP appointment, oxygen saturations in am when first wake up.   Any increase shortness of breath    Marbin Valdez RN

## 2023-03-24 ENCOUNTER — OFFICE VISIT (OUTPATIENT)
Dept: FAMILY MEDICINE CLINIC | Age: 73
End: 2023-03-24

## 2023-03-24 VITALS
HEIGHT: 62 IN | SYSTOLIC BLOOD PRESSURE: 128 MMHG | DIASTOLIC BLOOD PRESSURE: 80 MMHG | OXYGEN SATURATION: 91 % | WEIGHT: 258 LBS | BODY MASS INDEX: 47.48 KG/M2 | TEMPERATURE: 97 F | HEART RATE: 85 BPM

## 2023-03-24 DIAGNOSIS — E27.8 ADRENAL NODULE (HCC): ICD-10-CM

## 2023-03-24 DIAGNOSIS — Z09 HOSPITAL DISCHARGE FOLLOW-UP: Primary | ICD-10-CM

## 2023-03-24 DIAGNOSIS — J96.01 ACUTE RESPIRATORY FAILURE WITH HYPOXIA (HCC): ICD-10-CM

## 2023-03-24 DIAGNOSIS — I26.99 ACUTE MASSIVE PULMONARY EMBOLISM (HCC): ICD-10-CM

## 2023-03-24 DIAGNOSIS — G47.33 OBSTRUCTIVE SLEEP APNEA SYNDROME: ICD-10-CM

## 2023-03-24 SDOH — ECONOMIC STABILITY: FOOD INSECURITY: WITHIN THE PAST 12 MONTHS, THE FOOD YOU BOUGHT JUST DIDN'T LAST AND YOU DIDN'T HAVE MONEY TO GET MORE.: NEVER TRUE

## 2023-03-24 SDOH — ECONOMIC STABILITY: INCOME INSECURITY: IN THE LAST 12 MONTHS, WAS THERE A TIME WHEN YOU WERE NOT ABLE TO PAY THE MORTGAGE OR RENT ON TIME?: NO

## 2023-03-24 SDOH — ECONOMIC STABILITY: FOOD INSECURITY: WITHIN THE PAST 12 MONTHS, YOU WORRIED THAT YOUR FOOD WOULD RUN OUT BEFORE YOU GOT MONEY TO BUY MORE.: NEVER TRUE

## 2023-03-24 ASSESSMENT — PATIENT HEALTH QUESTIONNAIRE - PHQ9
9. THOUGHTS THAT YOU WOULD BE BETTER OFF DEAD, OR OF HURTING YOURSELF: 0
10. IF YOU CHECKED OFF ANY PROBLEMS, HOW DIFFICULT HAVE THESE PROBLEMS MADE IT FOR YOU TO DO YOUR WORK, TAKE CARE OF THINGS AT HOME, OR GET ALONG WITH OTHER PEOPLE: 0
SUM OF ALL RESPONSES TO PHQ QUESTIONS 1-9: 0
8. MOVING OR SPEAKING SO SLOWLY THAT OTHER PEOPLE COULD HAVE NOTICED. OR THE OPPOSITE, BEING SO FIGETY OR RESTLESS THAT YOU HAVE BEEN MOVING AROUND A LOT MORE THAN USUAL: 0
SUM OF ALL RESPONSES TO PHQ9 QUESTIONS 1 & 2: 0
7. TROUBLE CONCENTRATING ON THINGS, SUCH AS READING THE NEWSPAPER OR WATCHING TELEVISION: 0
SUM OF ALL RESPONSES TO PHQ QUESTIONS 1-9: 0
4. FEELING TIRED OR HAVING LITTLE ENERGY: 0
5. POOR APPETITE OR OVEREATING: 0
1. LITTLE INTEREST OR PLEASURE IN DOING THINGS: 0
2. FEELING DOWN, DEPRESSED OR HOPELESS: 0
6. FEELING BAD ABOUT YOURSELF - OR THAT YOU ARE A FAILURE OR HAVE LET YOURSELF OR YOUR FAMILY DOWN: 0
SUM OF ALL RESPONSES TO PHQ QUESTIONS 1-9: 0
SUM OF ALL RESPONSES TO PHQ QUESTIONS 1-9: 0
3. TROUBLE FALLING OR STAYING ASLEEP: 0

## 2023-03-24 ASSESSMENT — ENCOUNTER SYMPTOMS
SHORTNESS OF BREATH: 0
WHEEZING: 0
ABDOMINAL PAIN: 0
DIARRHEA: 0
CONSTIPATION: 0
EYE DISCHARGE: 0
VOMITING: 0
NAUSEA: 0
CHEST TIGHTNESS: 0
SORE THROAT: 0
COUGH: 0

## 2023-03-24 ASSESSMENT — SOCIAL DETERMINANTS OF HEALTH (SDOH): HOW HARD IS IT FOR YOU TO PAY FOR THE VERY BASICS LIKE FOOD, HOUSING, MEDICAL CARE, AND HEATING?: NOT HARD AT ALL

## 2023-03-24 NOTE — PROGRESS NOTES
CHANGE how you take these medications      * apixaban starter pack 5 MG Tbpk tablet  Commonly known as: Eliquis DVT/PE Starter Pack  Take 1 tablet by mouth See Admin Instructions  What changed: Another medication with the same name was added. Make sure you understand how and when to take each. Changed by: Gillian Garcia MD     * apixaban 5 MG Tabs tablet  Commonly known as: Eliquis  Take 1 tablet by mouth 2 times daily  What changed: You were already taking a medication with the same name, and this prescription was added. Make sure you understand how and when to take each. Changed by: Gillian Garcia MD           * This list has 2 medication(s) that are the same as other medications prescribed for you. Read the directions carefully, and ask your doctor or other care provider to review them with you.                 CONTINUE taking these medications      aspirin 81 MG EC tablet  Commonly known as: Aspirin Adult Low Strength  Take 1 tablet by mouth daily     Blood Pressure Kit  Use to check BP twice a day     hydroCHLOROthiazide 25 MG tablet  Commonly known as: HYDRODIURIL  TAKE ONE TABLET BY MOUTH EVERY MORNING     OCUVITE EYE HEATLH GUMMIES PO     Vitamin D3 50 MCG (2000 UT) Tabs  Take 1 tablet by mouth daily               Where to Get Your Medications        These medications were sent to [facility] [id number] [facility], [address] [place] [id number]      Phone: [phone]   apixaban 5 MG Tabs tablet          Medications marked \"taking\" at this time  Outpatient Medications Marked as Taking for the 3/24/23 encounter (Office Visit) with Gillian Garcia MD   Medication Sig Dispense Refill    apixaban (ELIQUIS) 5 MG TABS tablet Take 1 tablet by mouth 2 times daily 60 tablet 5    apixaban starter pack (ELIQUIS DVT/PE STARTER PACK) 5 MG TBPK tablet Take 1 tablet by mouth See Admin Instructions 74 tablet 0    Multiple Vitamins-Minerals (Romero Garcia

## 2023-03-29 ENCOUNTER — CARE COORDINATION (OUTPATIENT)
Dept: CASE MANAGEMENT | Age: 73
End: 2023-03-29

## 2023-03-29 DIAGNOSIS — R60.0 BILATERAL LEG EDEMA: Primary | ICD-10-CM

## 2023-03-29 NOTE — CARE COORDINATION
6/15/2023  2:00 PM MD Louise Cm Madi Ku   9/14/2023  2:00 PM [facility]     Care Transition Nurse reviewed medical action plan with patient and discussed any barriers to care and/or understanding of plan of care after discharge. Discussed appropriate site of care based on symptoms and resources available to patient including: PCP  Specialist  When to call 911. The patient agrees to contact the PCP office for questions related to their healthcare. Patients top risk factors for readmission: medical condition-PE  Interventions to address risk factors: Obtained and reviewed discharge summary and/or continuity of care documents       Care Transitions Subsequent and Final Call    Subsequent and Final Calls  Do you have any ongoing symptoms?: Yes  Onset of Patient-reported symptoms: Yesterday  Patient-reported symptoms: Other  Have your medications changed?: No  Do you have any questions related to your medications?: No  Do you currently have any active services?: No  Do you have any needs or concerns that I can assist you with?: No  Care Transitions Interventions  Other Interventions:             Care Transition Nurse provided contact information for future needs. Plan for follow-up call in 1-2 days based on severity of symptoms and risk factors. Plan for next call:  follow up on Venous US and if PCP ordered anything.     Domingo Castillo RN

## 2023-03-30 ENCOUNTER — HOSPITAL ENCOUNTER (OUTPATIENT)
Dept: ULTRASOUND IMAGING | Facility: CLINIC | Age: 73
Discharge: HOME | End: 2023-04-01
Payer: MEDICARE

## 2023-03-30 DIAGNOSIS — R60.0 BILATERAL LEG EDEMA: ICD-10-CM

## 2023-03-30 PROCEDURE — 93971 EXTREMITY STUDY: CPT

## 2023-03-31 ENCOUNTER — CARE COORDINATION (OUTPATIENT)
Dept: CASE MANAGEMENT | Age: 73
End: 2023-03-31

## 2023-03-31 NOTE — CARE COORDINATION
Obtained and reviewed discharge summary and/or continuity of care documents       Care Transitions Subsequent and Final Call    Subsequent and Final Calls  Do you have any ongoing symptoms?: No  Have your medications changed?: No  Do you have any questions related to your medications?: No  Do you currently have any active services?: No  Do you have any needs or concerns that I can assist you with?: No  Care Transitions Interventions  Other Interventions:             Care Transition Nurse provided contact information for future needs. Plan for follow-up call in 7-10 days based on severity of symptoms and risk factors.   Plan for next call:  follow up on any s/s of bleeding from anticoag, any increased shortness of breath PE    Beulah Diamond RN

## 2023-04-05 ENCOUNTER — CARE COORDINATION (OUTPATIENT)
Dept: CASE MANAGEMENT | Age: 73
End: 2023-04-05

## 2023-04-05 NOTE — CARE COORDINATION
[address] Care Transitions Follow Up Call    Patient: Jeremy Alas  Patient : 1950   MRN: [id number]  Reason for Admission: Acute pulmonary embolism with acute cor pulmonale,  Discharge Date: 3/17/23 RARS: Readmission Risk Score: 11.3      Needs to be reviewed by the provider   Additional needs identified to be addressed with provider: No  none             Method of communication with provider: none. Attempt to reach patient for Care Transitions. [facility] requesting return call. Contact information provided.   [phone]    Follow Up  Future Appointments   Date Time Provider Nick Tolbert   2023  1:45 PM MD Collins Blackwood   6/15/2023  2:00 PM MD Louise Sharp   2023  2:00 PM MD Louise Sharp [facility] AND [facility]S [facility] Transitions Subsequent and Final Call    Subsequent and Final Calls  Care Transitions Interventions  Other Interventions:             Michelle Patino RN

## 2023-04-06 ENCOUNTER — CARE COORDINATION (OUTPATIENT)
Dept: CASE MANAGEMENT | Age: 73
End: 2023-04-06

## 2023-04-06 NOTE — CARE COORDINATION
[facility] Care Transitions Follow Up Call    Patient: Clay Alfaro  Patient : 1950   MRN: [id number]  Reason for Admission: Acute pulmonary embolism with acute cor pulmonale,  Discharge Date: 3/17/23 RARS: Readmission Risk Score: 11.3      Needs to be reviewed by the provider   Additional needs identified to be addressed with provider: No  none             Method of communication with provider: none. Attempt to reach patient for Care Transitions. [facility] requesting return call. Contact information provided.   [phone]    Follow Up  Future Appointments   Date Time Provider Nick Tolbert   2023  1:45 PM Uri Grover MD [facility] [place] [address] [facility]   6/15/2023  2:00 PM Andre Melissa MD [facility]   2023  2:00 PM MD Louise Lovelace [facility] AND WOMEN[facility]S [facility] [address] Transitions Subsequent and Final Call    Subsequent and Final Calls  Care Transitions Interventions  Other Interventions:             Greyson Brown RN

## 2023-04-07 ENCOUNTER — CARE COORDINATION (OUTPATIENT)
Dept: CASE MANAGEMENT | Age: 73
End: 2023-04-07

## 2023-04-07 NOTE — CARE COORDINATION
[facility] Care Transitions Follow Up Call    Patient Current Location:  Home: [id number] [place] [address] [address] [place], [place] [address]    Care Transition Nurse contacted the patient by telephone to follow up after admission on 3/15/23. Verified name and  with patient as identifiers. Patient: Nba Quinn  Patient : 1950   MRN: [id number]  Reason for Admission: Acute pulmonary embolism with acute cor pulmonale,  Discharge Date: 3/17/23 RARS: Readmission Risk Score: 11.3      Needs to be reviewed by the provider   Additional needs identified to be addressed with provider: No  none             Method of communication with provider: none. Was able to contact Jyoti Mera for transitional outreach. She stated that she was doing \"ok\". She denied any shortness of breath, cough and has some swelling in her ankles and feet when she is up on for too long. She said that she was running errands without her oxygen and she was not short of breath. She had no further questions/concerns. Addressed changes since last contact:  none  Discussed follow-up appointments. If no appointment was previously scheduled, appointment scheduling offered: Yes. Is follow up appointment scheduled within 7 days of discharge? Yes. Follow Up  Future Appointments   Date Time Provider Nick Tolbert   2023  1:45 PM José Antonio Aguilar MD [facility] Delia Lacy   6/15/2023  2:00 PM MD Louise Johnson   2023  2:00 PM MD Louise Johnson [facility]     Non[facility] follow up appointment(s):     Care Transition Nurse reviewed medical action plan with patient and discussed any barriers to care and/or understanding of plan of care after discharge. Discussed appropriate site of care based on symptoms and resources available to patient including: PCP  Specialist  When to call 911. The patient agrees to contact the PCP office for questions related to their healthcare.      Patients top risk factors for readmission: medical

## 2023-04-17 ENCOUNTER — TELEPHONE (OUTPATIENT)
Dept: FAMILY MEDICINE CLINIC | Age: 73
End: 2023-04-17

## 2023-04-17 NOTE — TELEPHONE ENCOUNTER
Patient will need a letter faxed to [employer]  stating that she no longer needs the oxygen and the order can be discontinued . Patient stated that is the only way they will pick the oxygen canister up. She has not used and want it gone.         Fax number [phone]

## 2023-04-20 ENCOUNTER — CARE COORDINATION (OUTPATIENT)
Dept: CASE MANAGEMENT | Age: 73
End: 2023-04-20

## 2023-04-20 NOTE — CARE COORDINATION
[facility] Care Transitions Follow Up Call    Patient Current Location:  Home: [address] [place] [address] [address] [place] [address] [place]    Care Transition Nurse contacted the patient by telephone to follow up after admission. Verified name and  with patient as identifiers. Patient: Aparna Garcia  Patient : 1950   MRN: [id number]  Reason for Admission: Acute pulmonary embolism with acute cor pulmonale  Discharge Date: 3/17/23 RARS: Readmission Risk Score: 11.3      Needs to be reviewed by the provider   Additional needs identified to be addressed with provider: No  none             Method of communication with provider: none. Donovan Madera answers the phone stating that this call woke her up. States that she is, \"doing fine\". No questions, concerns at this time. Addressed changes since last contact:  none  Discussed follow-up appointments. If no appointment was previously scheduled, appointment scheduling offered: completed prior to call. Is follow up appointment scheduled within 7 days of discharge? Yes. Follow Up  Future Appointments   Date Time Provider Nick Tolbert   2023  1:45 PM Adalberto Torres MD [facility] Delia Medina   6/15/2023  2:00 PM MD Louise Grayson   2023  2:00 PM MD Loi[place] Grayson [facility]     No further follow-up call indicated based on severity of symptoms and risk factors.   Plan for next call: referral to ambulatory care [address] RN

## 2023-04-25 ENCOUNTER — CARE COORDINATION (OUTPATIENT)
Dept: CARE COORDINATION | Age: 73
End: 2023-04-25

## 2023-04-25 NOTE — CARE COORDINATION
Attempted outreach for CC needs, HTN management, CTN hand off  NO answer and LVM with call back information

## 2023-04-28 ENCOUNTER — CARE COORDINATION (OUTPATIENT)
Dept: CARE COORDINATION | Age: 73
End: 2023-04-28

## 2023-04-28 NOTE — CARE COORDINATION
Attempted outreach for CC needs, enrollment, well-being  No answer and LVM with call back information

## 2023-05-01 ENCOUNTER — OFFICE VISIT (OUTPATIENT)
Dept: PULMONOLOGY | Age: 73
End: 2023-05-01
Payer: MEDICARE

## 2023-05-01 VITALS
SYSTOLIC BLOOD PRESSURE: 137 MMHG | TEMPERATURE: 97.4 F | RESPIRATION RATE: 16 BRPM | HEIGHT: 62 IN | DIASTOLIC BLOOD PRESSURE: 73 MMHG | BODY MASS INDEX: 48.21 KG/M2 | HEART RATE: 77 BPM | WEIGHT: 262 LBS | OXYGEN SATURATION: 96 %

## 2023-05-01 DIAGNOSIS — E66.01 MORBID OBESITY WITH BMI OF 45.0-49.9, ADULT (HCC): ICD-10-CM

## 2023-05-01 DIAGNOSIS — I26.99 BILATERAL PULMONARY EMBOLISM (HCC): Primary | ICD-10-CM

## 2023-05-01 PROCEDURE — 3017F COLORECTAL CA SCREEN DOC REV: CPT | Performed by: INTERNAL MEDICINE

## 2023-05-01 PROCEDURE — 3074F SYST BP LT 130 MM HG: CPT | Performed by: INTERNAL MEDICINE

## 2023-05-01 PROCEDURE — G8427 DOCREV CUR MEDS BY ELIG CLIN: HCPCS | Performed by: INTERNAL MEDICINE

## 2023-05-01 PROCEDURE — 1036F TOBACCO NON-USER: CPT | Performed by: INTERNAL MEDICINE

## 2023-05-01 PROCEDURE — G8417 CALC BMI ABV UP PARAM F/U: HCPCS | Performed by: INTERNAL MEDICINE

## 2023-05-01 PROCEDURE — 1123F ACP DISCUSS/DSCN MKR DOCD: CPT | Performed by: INTERNAL MEDICINE

## 2023-05-01 PROCEDURE — 1090F PRES/ABSN URINE INCON ASSESS: CPT | Performed by: INTERNAL MEDICINE

## 2023-05-01 PROCEDURE — G8399 PT W/DXA RESULTS DOCUMENT: HCPCS | Performed by: INTERNAL MEDICINE

## 2023-05-01 PROCEDURE — 99204 OFFICE O/P NEW MOD 45 MIN: CPT | Performed by: INTERNAL MEDICINE

## 2023-05-01 PROCEDURE — 3078F DIAST BP <80 MM HG: CPT | Performed by: INTERNAL MEDICINE

## 2023-05-04 ENCOUNTER — CARE COORDINATION (OUTPATIENT)
Dept: CARE COORDINATION | Age: 73
End: 2023-05-04

## 2023-05-04 NOTE — CARE COORDINATION
ACM attempted multiple outreaches for CC needs, CTN hand-off, HTN management and well-being.    No answer and LVM with call back information    No  further outreaches at this time

## 2023-05-12 DIAGNOSIS — I10 ESSENTIAL HYPERTENSION: ICD-10-CM

## 2023-05-12 RX ORDER — HYDROCHLOROTHIAZIDE 25 MG/1
TABLET ORAL
Qty: 90 TABLET | Refills: 1 | Status: SHIPPED | OUTPATIENT
Start: 2023-05-12

## 2023-05-12 NOTE — TELEPHONE ENCOUNTER
Requested Prescriptions     Pending Prescriptions Disp Refills    hydroCHLOROthiazide (HYDRODIURIL) 25 MG tablet 90 tablet 1     Sig: TAKE ONE TABLET BY MOUTH EVERY MORNING

## 2023-07-10 PROBLEM — E66.01 MORBID OBESITY WITH BMI OF 45.0-49.9, ADULT (HCC): Status: ACTIVE | Noted: 2021-02-04

## 2023-07-10 PROBLEM — I26.99 ACUTE MASSIVE PULMONARY EMBOLISM (HCC): Status: RESOLVED | Noted: 2023-03-16 | Resolved: 2023-07-10

## 2023-07-20 ENCOUNTER — HOSPITAL ENCOUNTER (OUTPATIENT)
Dept: NON INVASIVE DIAGNOSTICS | Age: 73
Discharge: HOME | End: 2023-07-20
Payer: MEDICARE

## 2023-07-20 DIAGNOSIS — R06.02 SHORTNESS OF BREATH: ICD-10-CM

## 2023-07-20 LAB
LV EF: 65 %
LVEF MODALITY: NORMAL

## 2023-07-20 PROCEDURE — 93306 TTE W/DOPPLER COMPLETE: CPT

## 2023-08-04 DIAGNOSIS — E55.9 VITAMIN D DEFICIENCY: ICD-10-CM

## 2023-08-04 RX ORDER — CHOLECALCIFEROL (VITAMIN D3) 125 MCG
CAPSULE ORAL
Qty: 30 TABLET | Refills: 5 | Status: SHIPPED | OUTPATIENT
Start: 2023-08-04

## 2023-08-04 NOTE — TELEPHONE ENCOUNTER
Alexander Shields is calling to request a refill on the following medication(s):    Medication Request:  Requested Prescriptions     Pending Prescriptions Disp Refills    Cholecalciferol (VITAMIN D3) 50 MCG (2000 UT) TABS [Pharmacy Med Name: VITAMIN D3 50 MCG TABLET] 30 tablet 5     Sig: TAKE ONE TABLET BY MOUTH DAILY       Last Visit Date (If Applicable):  5/40/9337    Next Visit Date:    9/14/2023

## 2023-09-12 DIAGNOSIS — E55.9 VITAMIN D DEFICIENCY: ICD-10-CM

## 2023-09-12 RX ORDER — CHOLECALCIFEROL (VITAMIN D3) 125 MCG
1 CAPSULE ORAL DAILY
Qty: 30 TABLET | Refills: 5 | Status: SHIPPED | OUTPATIENT
Start: 2023-09-12 | End: 2023-09-14 | Stop reason: SDUPTHER

## 2023-09-12 NOTE — TELEPHONE ENCOUNTER
Lonny Shah is calling to request a refill on the following medication(s):    Medication Request:  Requested Prescriptions     Pending Prescriptions Disp Refills    Cholecalciferol (VITAMIN D3) 50 MCG (2000 UT) TABS 30 tablet 5     Sig: Take 1 tablet by mouth daily       Last Visit Date (If Applicable):  7/51/3537    Next Visit Date:    9/14/2023

## 2023-09-13 SDOH — HEALTH STABILITY: PHYSICAL HEALTH: ON AVERAGE, HOW MANY DAYS PER WEEK DO YOU ENGAGE IN MODERATE TO STRENUOUS EXERCISE (LIKE A BRISK WALK)?: 0 DAYS

## 2023-09-13 ASSESSMENT — PATIENT HEALTH QUESTIONNAIRE - PHQ9
SUM OF ALL RESPONSES TO PHQ QUESTIONS 1-9: 2
1. LITTLE INTEREST OR PLEASURE IN DOING THINGS: 1
SUM OF ALL RESPONSES TO PHQ QUESTIONS 1-9: 2
SUM OF ALL RESPONSES TO PHQ9 QUESTIONS 1 & 2: 2
2. FEELING DOWN, DEPRESSED OR HOPELESS: 1

## 2023-09-13 ASSESSMENT — LIFESTYLE VARIABLES
HOW OFTEN DURING THE LAST YEAR HAVE YOU HAD A FEELING OF GUILT OR REMORSE AFTER DRINKING: 0
HOW OFTEN DO YOU HAVE A DRINK CONTAINING ALCOHOL: MONTHLY OR LESS
HOW OFTEN DURING THE LAST YEAR HAVE YOU BEEN UNABLE TO REMEMBER WHAT HAPPENED THE NIGHT BEFORE BECAUSE YOU HAD BEEN DRINKING: NEVER
HOW OFTEN DURING THE LAST YEAR HAVE YOU FAILED TO DO WHAT WAS NORMALLY EXPECTED FROM YOU BECAUSE OF DRINKING: NEVER
HOW MANY STANDARD DRINKS CONTAINING ALCOHOL DO YOU HAVE ON A TYPICAL DAY: 1
HAVE YOU OR SOMEONE ELSE BEEN INJURED AS A RESULT OF YOUR DRINKING: 0
HOW OFTEN DURING THE LAST YEAR HAVE YOU BEEN UNABLE TO REMEMBER WHAT HAPPENED THE NIGHT BEFORE BECAUSE YOU HAD BEEN DRINKING: 0
HOW OFTEN DURING THE LAST YEAR HAVE YOU HAD A FEELING OF GUILT OR REMORSE AFTER DRINKING: NEVER
HOW OFTEN DURING THE LAST YEAR HAVE YOU NEEDED AN ALCOHOLIC DRINK FIRST THING IN THE MORNING TO GET YOURSELF GOING AFTER A NIGHT OF HEAVY DRINKING: 0
HOW OFTEN DURING THE LAST YEAR HAVE YOU FOUND THAT YOU WERE NOT ABLE TO STOP DRINKING ONCE YOU HAD STARTED: NEVER
HOW OFTEN DO YOU HAVE A DRINK CONTAINING ALCOHOL: 2
HAS A RELATIVE, FRIEND, DOCTOR, OR ANOTHER HEALTH PROFESSIONAL EXPRESSED CONCERN ABOUT YOUR DRINKING OR SUGGESTED YOU CUT DOWN: NO
HOW OFTEN DO YOU HAVE SIX OR MORE DRINKS ON ONE OCCASION: 2
HAVE YOU OR SOMEONE ELSE BEEN INJURED AS A RESULT OF YOUR DRINKING: NO
HOW MANY STANDARD DRINKS CONTAINING ALCOHOL DO YOU HAVE ON A TYPICAL DAY: 1 OR 2
HOW OFTEN DURING THE LAST YEAR HAVE YOU FOUND THAT YOU WERE NOT ABLE TO STOP DRINKING ONCE YOU HAD STARTED: 0
HAS A RELATIVE, FRIEND, DOCTOR, OR ANOTHER HEALTH PROFESSIONAL EXPRESSED CONCERN ABOUT YOUR DRINKING OR SUGGESTED YOU CUT DOWN: 0
HOW OFTEN DURING THE LAST YEAR HAVE YOU NEEDED AN ALCOHOLIC DRINK FIRST THING IN THE MORNING TO GET YOURSELF GOING AFTER A NIGHT OF HEAVY DRINKING: NEVER
HOW OFTEN DURING THE LAST YEAR HAVE YOU FAILED TO DO WHAT WAS NORMALLY EXPECTED FROM YOU BECAUSE OF DRINKING: 0

## 2023-09-14 ENCOUNTER — OFFICE VISIT (OUTPATIENT)
Dept: FAMILY MEDICINE CLINIC | Age: 73
End: 2023-09-14
Payer: MEDICARE

## 2023-09-14 VITALS
HEIGHT: 62 IN | SYSTOLIC BLOOD PRESSURE: 124 MMHG | OXYGEN SATURATION: 98 % | WEIGHT: 272.6 LBS | BODY MASS INDEX: 50.16 KG/M2 | DIASTOLIC BLOOD PRESSURE: 80 MMHG | HEART RATE: 90 BPM

## 2023-09-14 DIAGNOSIS — Z00.00 MEDICARE ANNUAL WELLNESS VISIT, SUBSEQUENT: Primary | ICD-10-CM

## 2023-09-14 DIAGNOSIS — R73.03 PREDIABETES: ICD-10-CM

## 2023-09-14 DIAGNOSIS — E55.9 VITAMIN D DEFICIENCY: ICD-10-CM

## 2023-09-14 LAB — HBA1C MFR BLD: 5.5 %

## 2023-09-14 PROCEDURE — 3074F SYST BP LT 130 MM HG: CPT | Performed by: STUDENT IN AN ORGANIZED HEALTH CARE EDUCATION/TRAINING PROGRAM

## 2023-09-14 PROCEDURE — 3079F DIAST BP 80-89 MM HG: CPT | Performed by: STUDENT IN AN ORGANIZED HEALTH CARE EDUCATION/TRAINING PROGRAM

## 2023-09-14 PROCEDURE — 1123F ACP DISCUSS/DSCN MKR DOCD: CPT | Performed by: STUDENT IN AN ORGANIZED HEALTH CARE EDUCATION/TRAINING PROGRAM

## 2023-09-14 PROCEDURE — 83036 HEMOGLOBIN GLYCOSYLATED A1C: CPT | Performed by: STUDENT IN AN ORGANIZED HEALTH CARE EDUCATION/TRAINING PROGRAM

## 2023-09-14 PROCEDURE — 3017F COLORECTAL CA SCREEN DOC REV: CPT | Performed by: STUDENT IN AN ORGANIZED HEALTH CARE EDUCATION/TRAINING PROGRAM

## 2023-09-14 PROCEDURE — G0439 PPPS, SUBSEQ VISIT: HCPCS | Performed by: STUDENT IN AN ORGANIZED HEALTH CARE EDUCATION/TRAINING PROGRAM

## 2023-09-14 RX ORDER — CHOLECALCIFEROL (VITAMIN D3) 125 MCG
1 CAPSULE ORAL DAILY
Qty: 30 TABLET | Refills: 5 | Status: SHIPPED | OUTPATIENT
Start: 2023-09-14

## 2023-09-14 NOTE — PATIENT INSTRUCTIONS
condition or this instruction, always ask your healthcare professional. [address] any warranty or liability for your use of this information. Learning About Dental Care for Older Adults  Dental care for older adults: Overview  Dental care for older people is much the same as for younger adults. But older adults do have concerns that younger adults do not. Older adults may have problems with gum disease and decay on the roots of their teeth. They may need missing teeth replaced or broken fillings fixed. Or they may have dentures that need to be cared for. Some older adults may have trouble holding a toothbrush. You can help remind the person you are caring for to brush and floss their teeth or to clean their dentures. In some cases, you may need to do the brushing and other dental care tasks. People who have trouble using their hands or who have dementia may need this extra help. How can you help with dental care? Normal dental care  To keep the teeth and gums healthy:  Brush the teeth with fluoride toothpaste twice a day--in the morning and at night--and floss at least once a day. Plaque can quickly build up on the teeth of older adults. Watch for the signs of gum disease. These signs include gums that bleed after brushing or after eating hard foods, such as apples. See a dentist regularly. Many experts recommend checkups every 6 months. Keep the dentist up to date on any new medications the person is taking. Encourage a balanced diet that includes whole grains, vegetables, and fruits, and that is low in saturated fat and sodium. Encourage the person you're caring for not to use tobacco products. They can affect dental and general health. Many older adults have a fixed income and feel that they can't afford dental care. But most [facility] and [facility] have programs in which dentists help older adults by lowering fees.  Contact your area's public health offices or

## 2023-10-09 DIAGNOSIS — I26.99 ACUTE MASSIVE PULMONARY EMBOLISM (HCC): ICD-10-CM

## 2023-10-09 RX ORDER — APIXABAN 5 MG/1
5 TABLET, FILM COATED ORAL 2 TIMES DAILY
Qty: 180 TABLET | OUTPATIENT
Start: 2023-10-09

## 2023-10-09 NOTE — TELEPHONE ENCOUNTER
Regla Riddle is calling to request a refill on the following medication(s):    Medication Request:  Requested Prescriptions     Pending Prescriptions Disp Refills    ELIQUIS 5 MG TABS tablet [Pharmacy Med Name: ELIQUIS 5 MG TABLET] 180 tablet      Sig: TAKE ONE TABLET BY MOUTH TWICE A DAY       Last Visit Date (If Applicable):  6/67/8452    Next Visit Date:    12/18/2023

## 2023-10-09 NOTE — TELEPHONE ENCOUNTER
Carolina Freed is calling to request a refill on the following medication(s):    Medication Request:  Requested Prescriptions     Pending Prescriptions Disp Refills    apixaban (ELIQUIS) 5 MG TABS tablet 60 tablet 5     Sig: Take 1 tablet by mouth 2 times daily       Last Visit Date (If Applicable):  7/74/2196    Next Visit Date:    10/9/2023

## 2023-11-06 ENCOUNTER — TELEPHONE (OUTPATIENT)
Dept: PULMONOLOGY | Age: 73
End: 2023-11-06

## 2023-11-06 DIAGNOSIS — I10 ESSENTIAL HYPERTENSION: ICD-10-CM

## 2023-11-06 RX ORDER — HYDROCHLOROTHIAZIDE 25 MG/1
TABLET ORAL
Qty: 90 TABLET | Refills: 1 | Status: SHIPPED | OUTPATIENT
Start: 2023-11-06

## 2023-11-06 NOTE — TELEPHONE ENCOUNTER
Benjamin Daly is calling to request a refill on the following medication(s):    Medication Request:  Requested Prescriptions     Pending Prescriptions Disp Refills    hydroCHLOROthiazide (HYDRODIURIL) 25 MG tablet 90 tablet 1     Sig: TAKE ONE TABLET BY MOUTH EVERY MORNING       Last Visit Date (If Applicable):  8/08/7561    Next Visit Date:    12/18/2023

## 2023-11-07 DIAGNOSIS — I10 ESSENTIAL HYPERTENSION: ICD-10-CM

## 2023-11-07 RX ORDER — HYDROCHLOROTHIAZIDE 25 MG/1
TABLET ORAL
Qty: 90 TABLET | Refills: 1 | OUTPATIENT
Start: 2023-11-07

## 2023-11-07 NOTE — TELEPHONE ENCOUNTER
Lonny Shah is calling to request a refill on the following medication(s):    Medication Request:  Requested Prescriptions     Pending Prescriptions Disp Refills    hydroCHLOROthiazide (HYDRODIURIL) 25 MG tablet [Pharmacy Med Name: hydroCHLOROthiazide 25 MG TABLET] 90 tablet 1     Sig: TAKE ONE TABLET BY MOUTH EVERY MORNING       Last Visit Date (If Applicable):  7/43/5308    Next Visit Date:    12/18/2023

## 2023-11-22 DIAGNOSIS — Z12.11 SCREENING FOR COLON CANCER: Primary | ICD-10-CM

## 2023-12-13 ENCOUNTER — HOSPITAL ENCOUNTER (OUTPATIENT)
Dept: MAMMOGRAPHY | Age: 73
Discharge: HOME | End: 2023-12-15
Payer: MEDICARE

## 2023-12-13 DIAGNOSIS — Z12.31 ENCOUNTER FOR SCREENING MAMMOGRAM FOR BREAST CANCER: ICD-10-CM

## 2023-12-13 PROCEDURE — 77063 BREAST TOMOSYNTHESIS BI: CPT

## 2024-03-04 DIAGNOSIS — I26.99 ACUTE MASSIVE PULMONARY EMBOLISM (HCC): ICD-10-CM

## 2024-03-04 NOTE — TELEPHONE ENCOUNTER
Maureen Dupree is calling to request a refill on the following medication(s):    Medication Request:  Requested Prescriptions     Pending Prescriptions Disp Refills    apixaban (ELIQUIS) 5 MG TABS tablet 60 tablet 5     Sig: Take 1 tablet by mouth 2 times daily       Last Visit Date (If Applicable):  12/18/2023    Next Visit Date:    3/18/2024

## 2024-03-10 DIAGNOSIS — E55.9 VITAMIN D DEFICIENCY: ICD-10-CM

## 2024-03-11 RX ORDER — CHOLECALCIFEROL (VITAMIN D3) 125 MCG
1 CAPSULE ORAL DAILY
Qty: 30 TABLET | Refills: 5 | Status: SHIPPED | OUTPATIENT
Start: 2024-03-11

## 2024-03-11 NOTE — TELEPHONE ENCOUNTER
Maureen Dupree is calling to request a refill on the following medication(s):    Medication Request:  Requested Prescriptions     Pending Prescriptions Disp Refills    Cholecalciferol (VITAMIN D3) 50 MCG (2000 UT) TABS 30 tablet 5     Sig: Take 1 tablet by mouth daily       Last Visit Date (If Applicable):  12/18/2023    Next Visit Date:    3/18/2024

## 2024-03-18 ENCOUNTER — OFFICE VISIT (OUTPATIENT)
Dept: FAMILY MEDICINE CLINIC | Age: 74
End: 2024-03-18
Payer: MEDICARE

## 2024-03-18 VITALS
SYSTOLIC BLOOD PRESSURE: 122 MMHG | DIASTOLIC BLOOD PRESSURE: 78 MMHG | BODY MASS INDEX: 51.71 KG/M2 | HEART RATE: 100 BPM | OXYGEN SATURATION: 98 % | HEIGHT: 62 IN | WEIGHT: 281 LBS

## 2024-03-18 DIAGNOSIS — I26.99 BILATERAL PULMONARY EMBOLISM (HCC): ICD-10-CM

## 2024-03-18 DIAGNOSIS — I10 ESSENTIAL HYPERTENSION: Primary | ICD-10-CM

## 2024-03-18 DIAGNOSIS — F32.0 CURRENT MILD EPISODE OF MAJOR DEPRESSIVE DISORDER WITHOUT PRIOR EPISODE (HCC): ICD-10-CM

## 2024-03-18 DIAGNOSIS — D68.59 OTHER PRIMARY THROMBOPHILIA (HCC): ICD-10-CM

## 2024-03-18 DIAGNOSIS — E66.01 CLASS 3 SEVERE OBESITY DUE TO EXCESS CALORIES WITH SERIOUS COMORBIDITY AND BODY MASS INDEX (BMI) OF 50.0 TO 59.9 IN ADULT (HCC): ICD-10-CM

## 2024-03-18 DIAGNOSIS — N18.31 CHRONIC KIDNEY DISEASE, STAGE 3A (HCC): ICD-10-CM

## 2024-03-18 DIAGNOSIS — E27.8 ADRENAL NODULE (HCC): ICD-10-CM

## 2024-03-18 PROCEDURE — 3078F DIAST BP <80 MM HG: CPT | Performed by: STUDENT IN AN ORGANIZED HEALTH CARE EDUCATION/TRAINING PROGRAM

## 2024-03-18 PROCEDURE — G8484 FLU IMMUNIZE NO ADMIN: HCPCS | Performed by: STUDENT IN AN ORGANIZED HEALTH CARE EDUCATION/TRAINING PROGRAM

## 2024-03-18 PROCEDURE — G8427 DOCREV CUR MEDS BY ELIG CLIN: HCPCS | Performed by: STUDENT IN AN ORGANIZED HEALTH CARE EDUCATION/TRAINING PROGRAM

## 2024-03-18 PROCEDURE — 3017F COLORECTAL CA SCREEN DOC REV: CPT | Performed by: STUDENT IN AN ORGANIZED HEALTH CARE EDUCATION/TRAINING PROGRAM

## 2024-03-18 PROCEDURE — 3074F SYST BP LT 130 MM HG: CPT | Performed by: STUDENT IN AN ORGANIZED HEALTH CARE EDUCATION/TRAINING PROGRAM

## 2024-03-18 PROCEDURE — 1123F ACP DISCUSS/DSCN MKR DOCD: CPT | Performed by: STUDENT IN AN ORGANIZED HEALTH CARE EDUCATION/TRAINING PROGRAM

## 2024-03-18 PROCEDURE — 1090F PRES/ABSN URINE INCON ASSESS: CPT | Performed by: STUDENT IN AN ORGANIZED HEALTH CARE EDUCATION/TRAINING PROGRAM

## 2024-03-18 PROCEDURE — 1036F TOBACCO NON-USER: CPT | Performed by: STUDENT IN AN ORGANIZED HEALTH CARE EDUCATION/TRAINING PROGRAM

## 2024-03-18 PROCEDURE — G8399 PT W/DXA RESULTS DOCUMENT: HCPCS | Performed by: STUDENT IN AN ORGANIZED HEALTH CARE EDUCATION/TRAINING PROGRAM

## 2024-03-18 PROCEDURE — G8417 CALC BMI ABV UP PARAM F/U: HCPCS | Performed by: STUDENT IN AN ORGANIZED HEALTH CARE EDUCATION/TRAINING PROGRAM

## 2024-03-18 PROCEDURE — 99214 OFFICE O/P EST MOD 30 MIN: CPT | Performed by: STUDENT IN AN ORGANIZED HEALTH CARE EDUCATION/TRAINING PROGRAM

## 2024-03-18 RX ORDER — PHENTERMINE HYDROCHLORIDE 37.5 MG/1
37.5 TABLET ORAL
Qty: 30 TABLET | Refills: 0 | Status: SHIPPED | OUTPATIENT
Start: 2024-03-18 | End: 2024-04-17

## 2024-03-18 ASSESSMENT — PATIENT HEALTH QUESTIONNAIRE - PHQ9
SUM OF ALL RESPONSES TO PHQ QUESTIONS 1-9: 2
2. FEELING DOWN, DEPRESSED OR HOPELESS: NOT AT ALL
1. LITTLE INTEREST OR PLEASURE IN DOING THINGS: NOT AT ALL
SUM OF ALL RESPONSES TO PHQ QUESTIONS 1-9: 2
SUM OF ALL RESPONSES TO PHQ QUESTIONS 1-9: 2
5. POOR APPETITE OR OVEREATING: SEVERAL DAYS
7. TROUBLE CONCENTRATING ON THINGS, SUCH AS READING THE NEWSPAPER OR WATCHING TELEVISION: NOT AT ALL
4. FEELING TIRED OR HAVING LITTLE ENERGY: SEVERAL DAYS
3. TROUBLE FALLING OR STAYING ASLEEP: NOT AT ALL
9. THOUGHTS THAT YOU WOULD BE BETTER OFF DEAD, OR OF HURTING YOURSELF: NOT AT ALL
SUM OF ALL RESPONSES TO PHQ QUESTIONS 1-9: 2
SUM OF ALL RESPONSES TO PHQ9 QUESTIONS 1 & 2: 0
8. MOVING OR SPEAKING SO SLOWLY THAT OTHER PEOPLE COULD HAVE NOTICED. OR THE OPPOSITE, BEING SO FIGETY OR RESTLESS THAT YOU HAVE BEEN MOVING AROUND A LOT MORE THAN USUAL: NOT AT ALL
10. IF YOU CHECKED OFF ANY PROBLEMS, HOW DIFFICULT HAVE THESE PROBLEMS MADE IT FOR YOU TO DO YOUR WORK, TAKE CARE OF THINGS AT HOME, OR GET ALONG WITH OTHER PEOPLE: NOT DIFFICULT AT ALL
6. FEELING BAD ABOUT YOURSELF - OR THAT YOU ARE A FAILURE OR HAVE LET YOURSELF OR YOUR FAMILY DOWN: NOT AT ALL

## 2024-03-18 NOTE — PROGRESS NOTES
[facility]  [facility]  [address] [place] [address]  [address]  [facility] [address]  Dept: [phone]  Dept Fax: [phone]    Maureen Dupree is a 73 y.o. female who is a Established patient, who presents today for her medical conditions/complaints as noted below:  Chief Complaint   Patient presents with    Hypertension         HPI:     She is here today to follow-up on hypertension.  She states that her blood pressures have been well-controlled on hydrochlorothiazide.  She has been having trouble losing weight and states that she was told by orthopedics that she would need to lose weight to get her knee placement surgery done.  She was prescribed Ozempic at last visit but her insurance denied the medication.  She is willing to try Adipex short-term to lose some weight and be able to get the surgery.        Hemoglobin A1C (%)   Date Value   09/14/2023 5.5   09/09/2022 6.1 (H)   03/09/2022 6.3 (H)             ( goal A1Cis < 7)   No components found for: \"LABMICR\"  LDL Cholesterol (mg/dL)   Date Value   03/16/2023 106   09/09/2022 98   03/09/2022 128       (goal LDL is <100)   AST (U/L)   Date Value   09/09/2022 21     ALT (U/L)   Date Value   09/09/2022 13     BUN (mg/dL)   Date Value   03/17/2023 11     BP Readings from Last 3 Encounters:   03/18/24 122/78   12/18/23 122/78   09/14/23 124/80          (goal 120/80)    Past Medical History:   Diagnosis Date    Acute pulmonary embolism (HCC)     Acute respiratory failure with hypoxia (HCC) 03/17/2023    COPD (chronic obstructive pulmonary disease) (HCC)     Depression     Disease of blood and blood forming organ     Elevated blood pressure readings 01/04/2021    Hypertension     Troponin level elevated 01/04/2021      Past Surgical History:   Procedure Laterality Date    KNEE ARTHROSCOPY         Family History   Problem Relation Age of Onset    Breast Cancer Mother         under 50    Cancer Mother     Kidney Disease Father     Breast Cancer Maternal

## 2024-03-19 PROBLEM — E66.813 CLASS 3 SEVERE OBESITY DUE TO EXCESS CALORIES WITH SERIOUS COMORBIDITY AND BODY MASS INDEX (BMI) OF 50.0 TO 59.9 IN ADULT: Status: ACTIVE | Noted: 2021-02-04

## 2024-03-19 PROBLEM — J96.01 ACUTE RESPIRATORY FAILURE WITH HYPOXIA (HCC): Status: RESOLVED | Noted: 2023-03-17 | Resolved: 2024-03-19

## 2024-03-19 ASSESSMENT — ENCOUNTER SYMPTOMS
ABDOMINAL PAIN: 0
EYE DISCHARGE: 0
WHEEZING: 0
SORE THROAT: 0
DIARRHEA: 0
CHEST TIGHTNESS: 0
SHORTNESS OF BREATH: 0
VOMITING: 0

## 2024-04-19 ENCOUNTER — HOSPITAL ENCOUNTER (OUTPATIENT)
Age: 74
Setting detail: SPECIMEN
Discharge: HOME | End: 2024-04-19

## 2024-04-19 ENCOUNTER — OFFICE VISIT (OUTPATIENT)
Dept: FAMILY MEDICINE CLINIC | Age: 74
End: 2024-04-19

## 2024-04-19 VITALS
OXYGEN SATURATION: 97 % | WEIGHT: 269 LBS | HEART RATE: 104 BPM | SYSTOLIC BLOOD PRESSURE: 120 MMHG | DIASTOLIC BLOOD PRESSURE: 76 MMHG | BODY MASS INDEX: 49.5 KG/M2 | HEIGHT: 62 IN

## 2024-04-19 DIAGNOSIS — I10 ESSENTIAL HYPERTENSION: Primary | ICD-10-CM

## 2024-04-19 DIAGNOSIS — I10 ESSENTIAL HYPERTENSION: ICD-10-CM

## 2024-04-19 DIAGNOSIS — E55.9 VITAMIN D DEFICIENCY: ICD-10-CM

## 2024-04-19 DIAGNOSIS — R73.03 PREDIABETES: ICD-10-CM

## 2024-04-19 DIAGNOSIS — E66.01 CLASS 3 SEVERE OBESITY DUE TO EXCESS CALORIES WITH SERIOUS COMORBIDITY AND BODY MASS INDEX (BMI) OF 50.0 TO 59.9 IN ADULT (HCC): ICD-10-CM

## 2024-04-19 LAB
25(OH)D3 SERPL-MCNC: 39 NG/ML (ref 30–100)
ALBUMIN SERPL-MCNC: 4.3 G/DL (ref 3.5–5.2)
ALBUMIN/GLOB SERPL: 2 {RATIO} (ref 1–2.5)
ALP SERPL-CCNC: 62 U/L (ref 35–104)
ALT SERPL-CCNC: 15 U/L (ref 10–35)
ANION GAP SERPL CALCULATED.3IONS-SCNC: 13 MMOL/L (ref 9–16)
AST SERPL-CCNC: 24 U/L (ref 10–35)
BASOPHILS # BLD: 0.04 K/UL (ref 0–0.2)
BASOPHILS NFR BLD: 1 % (ref 0–2)
BILIRUB SERPL-MCNC: 0.2 MG/DL (ref 0–1.2)
BUN SERPL-MCNC: 25 MG/DL (ref 8–23)
CALCIUM SERPL-MCNC: 9.5 MG/DL (ref 8.6–10.4)
CHLORIDE SERPL-SCNC: 100 MMOL/L (ref 98–107)
CHOLEST SERPL-MCNC: 173 MG/DL (ref 0–199)
CHOLESTEROL/HDL RATIO: 3
CO2 SERPL-SCNC: 23 MMOL/L (ref 20–31)
CREAT SERPL-MCNC: 1.2 MG/DL (ref 0.5–0.9)
EOSINOPHIL # BLD: 0.05 K/UL (ref 0–0.44)
EOSINOPHILS RELATIVE PERCENT: 1 % (ref 1–4)
ERYTHROCYTE [DISTWIDTH] IN BLOOD BY AUTOMATED COUNT: 13.6 % (ref 11.8–14.4)
EST. AVERAGE GLUCOSE BLD GHB EST-MCNC: 126 MG/DL
GFR SERPL CREATININE-BSD FRML MDRD: 47 ML/MIN/1.73M2
GLUCOSE P FAST SERPL-MCNC: 121 MG/DL (ref 74–99)
HBA1C MFR BLD: 6 % (ref 4–6)
HCT VFR BLD AUTO: 32.6 % (ref 36.3–47.1)
HDLC SERPL-MCNC: 61 MG/DL
HGB BLD-MCNC: 10.1 G/DL (ref 11.9–15.1)
IMM GRANULOCYTES # BLD AUTO: 0.05 K/UL (ref 0–0.3)
IMM GRANULOCYTES NFR BLD: 1 %
LDLC SERPL CALC-MCNC: 87 MG/DL (ref 0–100)
LYMPHOCYTES NFR BLD: 1.41 K/UL (ref 1.1–3.7)
LYMPHOCYTES RELATIVE PERCENT: 19 % (ref 24–43)
MCH RBC QN AUTO: 29 PG (ref 25.2–33.5)
MCHC RBC AUTO-ENTMCNC: 31 G/DL (ref 28.4–34.8)
MCV RBC AUTO: 93.7 FL (ref 82.6–102.9)
MONOCYTES NFR BLD: 0.86 K/UL (ref 0.1–1.2)
MONOCYTES NFR BLD: 12 % (ref 3–12)
NEUTROPHILS NFR BLD: 66 % (ref 36–65)
NEUTS SEG NFR BLD: 4.88 K/UL (ref 1.5–8.1)
NRBC BLD-RTO: 0 PER 100 WBC
PLATELET # BLD AUTO: 394 K/UL (ref 138–453)
PMV BLD AUTO: 10.1 FL (ref 8.1–13.5)
POTASSIUM SERPL-SCNC: 3.3 MMOL/L (ref 3.7–5.3)
PROT SERPL-MCNC: 6.8 G/DL (ref 6.6–8.7)
RBC # BLD AUTO: 3.48 M/UL (ref 3.95–5.11)
SODIUM SERPL-SCNC: 136 MMOL/L (ref 136–145)
TRIGL SERPL-MCNC: 126 MG/DL (ref 0–149)
TSH SERPL DL<=0.05 MIU/L-ACNC: 3.41 UIU/ML (ref 0.27–4.2)
VLDLC SERPL CALC-MCNC: 25 MG/DL
WBC OTHER # BLD: 7.3 K/UL (ref 3.5–11.3)

## 2024-04-19 RX ORDER — PHENTERMINE HYDROCHLORIDE 15 MG/1
15 CAPSULE ORAL EVERY MORNING
Qty: 30 CAPSULE | Refills: 0 | Status: SHIPPED | OUTPATIENT
Start: 2024-04-19 | End: 2024-05-19

## 2024-04-19 NOTE — PROGRESS NOTES
Hemoglobin A1C     Standing Status:   Future     Number of Occurrences:   1     Standing Expiration Date:   10/19/2024    Lipid, Fasting     Standing Status:   Future     Number of Occurrences:   1     Standing Expiration Date:   10/19/2024    TSH with Reflex     Standing Status:   Future     Number of Occurrences:   1     Standing Expiration Date:   10/19/2024    Vitamin D 25 Hydroxy     Standing Status:   Future     Number of Occurrences:   1     Standing Expiration Date:   10/19/2024     Orders Placed This Encounter   Medications    phentermine 15 MG capsule     Sig: Take 1 capsule by mouth every morning for 30 days. Max Daily Amount: 15 mg     Dispense:  30 capsule     Refill:  0       Patient given educational materials - see patient instructions.Discussed use, benefit, and side effects of prescribed medications.  All patientquestions answered. Pt voiced understanding. Reviewed health maintenance.  Instructedto continue current medications, diet and exercise.  Patient agreed with treatmentplan. Follow up as directed.     Electronically signed by Conchita Raza MD on 4/21/2024 at 10:14 AM

## 2024-04-21 DIAGNOSIS — I10 ESSENTIAL HYPERTENSION: Primary | ICD-10-CM

## 2024-04-22 ENCOUNTER — TELEPHONE (OUTPATIENT)
Dept: FAMILY MEDICINE CLINIC | Age: 74
End: 2024-04-22

## 2024-04-24 ENCOUNTER — HOSPITAL ENCOUNTER (OUTPATIENT)
Age: 74
Setting detail: SPECIMEN
Discharge: HOME | End: 2024-04-24

## 2024-04-24 DIAGNOSIS — E87.6 HYPOKALEMIA: Primary | ICD-10-CM

## 2024-04-24 DIAGNOSIS — I10 ESSENTIAL HYPERTENSION: ICD-10-CM

## 2024-04-24 LAB
ANION GAP SERPL CALCULATED.3IONS-SCNC: 12 MMOL/L (ref 9–16)
BASOPHILS # BLD: 0.04 K/UL (ref 0–0.2)
BASOPHILS NFR BLD: 1 % (ref 0–2)
BUN SERPL-MCNC: 19 MG/DL (ref 8–23)
CALCIUM SERPL-MCNC: 9.2 MG/DL (ref 8.6–10.4)
CHLORIDE SERPL-SCNC: 100 MMOL/L (ref 98–107)
CO2 SERPL-SCNC: 24 MMOL/L (ref 20–31)
CREAT SERPL-MCNC: 1 MG/DL (ref 0.5–0.9)
EOSINOPHIL # BLD: 0.07 K/UL (ref 0–0.44)
EOSINOPHILS RELATIVE PERCENT: 1 % (ref 1–4)
ERYTHROCYTE [DISTWIDTH] IN BLOOD BY AUTOMATED COUNT: 13.4 % (ref 11.8–14.4)
GFR SERPL CREATININE-BSD FRML MDRD: 57 ML/MIN/1.73M2
GLUCOSE SERPL-MCNC: 111 MG/DL (ref 74–99)
HCT VFR BLD AUTO: 31.3 % (ref 36.3–47.1)
HGB BLD-MCNC: 10 G/DL (ref 11.9–15.1)
IMM GRANULOCYTES # BLD AUTO: 0.03 K/UL (ref 0–0.3)
IMM GRANULOCYTES NFR BLD: 0 %
LYMPHOCYTES NFR BLD: 1.44 K/UL (ref 1.1–3.7)
LYMPHOCYTES RELATIVE PERCENT: 21 % (ref 24–43)
MCH RBC QN AUTO: 29.1 PG (ref 25.2–33.5)
MCHC RBC AUTO-ENTMCNC: 31.9 G/DL (ref 28.4–34.8)
MCV RBC AUTO: 91 FL (ref 82.6–102.9)
MONOCYTES NFR BLD: 0.84 K/UL (ref 0.1–1.2)
MONOCYTES NFR BLD: 12 % (ref 3–12)
NEUTROPHILS NFR BLD: 65 % (ref 36–65)
NEUTS SEG NFR BLD: 4.48 K/UL (ref 1.5–8.1)
NRBC BLD-RTO: 0 PER 100 WBC
PLATELET # BLD AUTO: 416 K/UL (ref 138–453)
PMV BLD AUTO: 9.7 FL (ref 8.1–13.5)
POTASSIUM SERPL-SCNC: 3.3 MMOL/L (ref 3.7–5.3)
RBC # BLD AUTO: 3.44 M/UL (ref 3.95–5.11)
SODIUM SERPL-SCNC: 136 MMOL/L (ref 136–145)
WBC OTHER # BLD: 6.9 K/UL (ref 3.5–11.3)

## 2024-04-24 RX ORDER — POTASSIUM CHLORIDE 750 MG/1
10 TABLET, EXTENDED RELEASE ORAL DAILY
Qty: 10 TABLET | Refills: 0 | Status: SHIPPED | OUTPATIENT
Start: 2024-04-24 | End: 2024-05-04

## 2024-04-30 DIAGNOSIS — E87.6 HYPOKALEMIA: ICD-10-CM

## 2024-04-30 RX ORDER — POTASSIUM CHLORIDE 750 MG/1
10 TABLET, EXTENDED RELEASE ORAL DAILY
Qty: 10 TABLET | Refills: 0 | OUTPATIENT
Start: 2024-04-30 | End: 2024-05-10

## 2024-04-30 NOTE — TELEPHONE ENCOUNTER
Maureen Dupree is calling to request a refill on the following medication(s):    Medication Request:  Requested Prescriptions     Pending Prescriptions Disp Refills    potassium chloride (KLOR-CON M) 10 MEQ extended release tablet [Pharmacy Med Name: POTASSIUM CHLORIDE ER M-10 MEQ TAB] 10 tablet 0     Sig: TAKE 1 TABLET BY MOUTH DAILY FOR 10 DAYS       Last Visit Date (If Applicable):  4/19/2024    Next Visit Date:    5/22/2024

## 2024-05-01 DIAGNOSIS — I10 ESSENTIAL HYPERTENSION: ICD-10-CM

## 2024-05-01 DIAGNOSIS — E66.01 CLASS 3 SEVERE OBESITY DUE TO EXCESS CALORIES WITH SERIOUS COMORBIDITY AND BODY MASS INDEX (BMI) OF 50.0 TO 59.9 IN ADULT (HCC): ICD-10-CM

## 2024-05-01 RX ORDER — PHENTERMINE HYDROCHLORIDE 37.5 MG/1
37.5 TABLET ORAL
Qty: 30 TABLET | OUTPATIENT
Start: 2024-05-01

## 2024-05-01 RX ORDER — HYDROCHLOROTHIAZIDE 25 MG/1
TABLET ORAL
Qty: 90 TABLET | Refills: 1 | Status: SHIPPED | OUTPATIENT
Start: 2024-05-01

## 2024-05-01 NOTE — TELEPHONE ENCOUNTER
Maureen Dupree is calling to request a refill on the following medication(s):    Medication Request:  Requested Prescriptions     Pending Prescriptions Disp Refills    hydroCHLOROthiazide (HYDRODIURIL) 25 MG tablet [Pharmacy Med Name: hydroCHLOROthiazide 25 MG TABLET] 90 tablet 1     Sig: TAKE ONE TABLET BY MOUTH EVERY MORNING       Last Visit Date (If Applicable):  4/19/2024    Next Visit Date:    5/22/2024

## 2024-05-01 NOTE — TELEPHONE ENCOUNTER
Maureen Dupree is calling to request a refill on the following medication(s):    Medication Request:  Requested Prescriptions     Pending Prescriptions Disp Refills    phentermine (ADIPEX-P) 37.5 MG tablet [Pharmacy Med Name: PHENTERMINE 37.5 MG TABLET] 30 tablet      Sig: Take 1 tablet by mouth every morning (before breakfast).       Last Visit Date (If Applicable):  4/19/2024    Next Visit Date:    5/22/2024                 O-Z Plasty Text: The defect edges were debeveled with a #15c scalpel blade.  Given the location of the defect, shape of the defect and the proximity to free margins an O-Z plasty (double transposition flap) was deemed most appropriate.  Using a sterile surgical marker, the appropriate transposition flaps were drawn incorporating the defect and placing the expected incisions within the relaxed skin tension lines where possible.    The area thus outlined was incised deep to adipose tissue with a #15 scalpel blade.  The skin margins were undermined to an appropriate distance in all directions utilizing iris scissors.  Hemostasis was achieved with electrocautery.  The flaps were then transposed into place, one clockwise and the other counterclockwise, and anchored with interrupted buried subcutaneous sutures.

## 2024-05-15 LAB — NONINV COLON CA DNA+OCC BLD SCRN STL QL: NEGATIVE

## 2024-05-22 ENCOUNTER — OFFICE VISIT (OUTPATIENT)
Dept: FAMILY MEDICINE CLINIC | Age: 74
End: 2024-05-22

## 2024-05-22 VITALS
BODY MASS INDEX: 48.58 KG/M2 | HEIGHT: 62 IN | OXYGEN SATURATION: 98 % | DIASTOLIC BLOOD PRESSURE: 76 MMHG | WEIGHT: 264 LBS | SYSTOLIC BLOOD PRESSURE: 122 MMHG | HEART RATE: 96 BPM

## 2024-05-22 DIAGNOSIS — I10 ESSENTIAL HYPERTENSION: ICD-10-CM

## 2024-05-22 DIAGNOSIS — N18.31 STAGE 3A CHRONIC KIDNEY DISEASE (HCC): ICD-10-CM

## 2024-05-22 DIAGNOSIS — E66.01 CLASS 3 SEVERE OBESITY DUE TO EXCESS CALORIES WITH SERIOUS COMORBIDITY AND BODY MASS INDEX (BMI) OF 50.0 TO 59.9 IN ADULT (HCC): Primary | ICD-10-CM

## 2024-05-22 DIAGNOSIS — N18.31 ANEMIA DUE TO STAGE 3A CHRONIC KIDNEY DISEASE (HCC): ICD-10-CM

## 2024-05-22 DIAGNOSIS — D63.1 ANEMIA DUE TO STAGE 3A CHRONIC KIDNEY DISEASE (HCC): ICD-10-CM

## 2024-05-22 RX ORDER — FERROUS SULFATE 325(65) MG
325 TABLET ORAL 2 TIMES DAILY
Qty: 180 TABLET | Refills: 1 | Status: SHIPPED | OUTPATIENT
Start: 2024-05-22

## 2024-05-22 RX ORDER — PHENTERMINE HYDROCHLORIDE 15 MG/1
15 CAPSULE ORAL EVERY MORNING
Qty: 30 CAPSULE | Refills: 0 | Status: SHIPPED | OUTPATIENT
Start: 2024-05-22 | End: 2024-06-21

## 2024-05-22 ASSESSMENT — ENCOUNTER SYMPTOMS
CHEST TIGHTNESS: 0
SHORTNESS OF BREATH: 0
CONSTIPATION: 0
DIARRHEA: 0
COUGH: 0
EYE DISCHARGE: 0
SORE THROAT: 0
ABDOMINAL PAIN: 0
NAUSEA: 0
VOMITING: 0
WHEEZING: 0

## 2024-05-22 NOTE — PROGRESS NOTES
sounds. No murmur heard.     No gallop.   Pulmonary:      Effort: Pulmonary effort is normal. No respiratory distress.      Breath sounds: Normal breath sounds. No stridor. No wheezing.   Abdominal:      General: Bowel sounds are normal. There is no distension.      Palpations: Abdomen is soft.      Tenderness: There is no abdominal tenderness. There is no guarding or rebound.   Musculoskeletal:         General: No swelling or tenderness. Normal range of motion.      Cervical back: Normal range of motion and neck supple.   Skin:     General: Skin is warm and dry.      Coloration: Skin is not jaundiced.      Findings: No rash.   Neurological:      General: No focal deficit present.      Mental Status: She is alert and oriented to person, place, and time.   Psychiatric:         Mood and Affect: Mood normal.         Behavior: Behavior normal.         Thought Content: Thought content normal.         Judgment: Judgment normal.       /76   Pulse 96   Ht 1.575 m (5' 2\")   Wt 119.7 kg (264 lb)   SpO2 98%   BMI 48.29 kg/m²     Assessment/Plan:   1. Class 3 severe obesity due to excess calories with serious comorbidity and body mass index (BMI) of 50.0 to 59.9 in adult ([facility])  -     phentermine 15 MG capsule; Take 1 capsule by mouth every morning for 30 days. Max Daily Amount: 15 mg, Disp-30 capsule, R-0Normal  2. Essential hypertension  3. Stage 3a chronic kidney disease (HCC)  -     AFL (Epic) - Devin Ivy MD, Nephrology, [place]  4. Anemia due to stage 3a chronic kidney disease ([facility])  -     ferrous sulfate (IRON 325) 325 (65 Fe) MG tablet; Take 1 tablet by mouth 2 times daily, Disp-180 tablet, R-1Normal        Obesity-lost 5 pounds in past month, on phentermine 15 mg daily, could not tolerate higher dose    Hypertension-controlled, on hydrochlorothiazide 25 mg daily    Anemia-started on iron supplements, likely due to CKD, recent Cologuard negative      Return in about 1 month (around 6/22/2024) for weight

## 2024-06-19 ENCOUNTER — OFFICE VISIT (OUTPATIENT)
Dept: FAMILY MEDICINE CLINIC | Age: 74
End: 2024-06-19

## 2024-06-19 VITALS
HEART RATE: 73 BPM | BODY MASS INDEX: 48.4 KG/M2 | HEIGHT: 62 IN | DIASTOLIC BLOOD PRESSURE: 80 MMHG | SYSTOLIC BLOOD PRESSURE: 124 MMHG | WEIGHT: 263 LBS | OXYGEN SATURATION: 100 %

## 2024-06-19 DIAGNOSIS — D63.1 ANEMIA DUE TO STAGE 3A CHRONIC KIDNEY DISEASE (HCC): ICD-10-CM

## 2024-06-19 DIAGNOSIS — E66.01 CLASS 3 SEVERE OBESITY DUE TO EXCESS CALORIES WITH SERIOUS COMORBIDITY AND BODY MASS INDEX (BMI) OF 50.0 TO 59.9 IN ADULT (HCC): Primary | ICD-10-CM

## 2024-06-19 DIAGNOSIS — N18.31 ANEMIA DUE TO STAGE 3A CHRONIC KIDNEY DISEASE (HCC): ICD-10-CM

## 2024-06-19 RX ORDER — PHENTERMINE HYDROCHLORIDE 37.5 MG/1
37.5 TABLET ORAL
Qty: 30 TABLET | Refills: 0 | Status: SHIPPED | OUTPATIENT
Start: 2024-06-19 | End: 2024-07-19

## 2024-06-19 RX ORDER — DOCUSATE SODIUM 100 MG/1
100 CAPSULE, LIQUID FILLED ORAL 2 TIMES DAILY
Qty: 60 CAPSULE | Refills: 3 | Status: SHIPPED | OUTPATIENT
Start: 2024-06-19 | End: 2024-07-19

## 2024-06-19 ASSESSMENT — ENCOUNTER SYMPTOMS
SORE THROAT: 0
WHEEZING: 0
CONSTIPATION: 1
CHEST TIGHTNESS: 0
EYE DISCHARGE: 0
NAUSEA: 0
DIARRHEA: 0
VOMITING: 0
ABDOMINAL PAIN: 0
SHORTNESS OF BREATH: 0
COUGH: 0

## 2024-06-19 NOTE — PROGRESS NOTES
[facility]  [facility]  [address] [place] [address]  [address]  [facility] [place] [address]  Dept: [phone]  Dept Fax: [phone]    Maureen Dupree is a 74 y.o. female who is a Established patient, who presents today for her medical conditions/complaints as noted below:  Chief Complaint   Patient presents with    Weight Loss         HPI:     She is here today to follow-up on weight loss therapy.  She states that doing the low-dose phentermine 15 mg did not work for her at all and she has not lost any weight.  She states that she was doing better when she was splitting regular Adipex tablet 37.5 mg to half a tablet daily.  She also was found to have anemia on her previous labs and was supposed to take iron supplements but she stopped because of constipation.  She likely has anemia of chronic disease due to CKD, her recent Cologuard was negative.         Hemoglobin A1C (%)   Date Value   04/19/2024 6.0   09/14/2023 5.5   09/09/2022 6.1 (H)             ( goal A1Cis < 7)   No components found for: \"LABMICR\"  No components found for: \"LDLCHOLESTEROL\", \"LDLCALC\"    (goal LDL is <100)   AST (U/L)   Date Value   04/19/2024 24     ALT (U/L)   Date Value   04/19/2024 15     BUN (mg/dL)   Date Value   04/24/2024 19     BP Readings from Last 3 Encounters:   06/19/24 124/80   05/22/24 122/76   04/19/24 120/76          (goal 120/80)    Past Medical History:   Diagnosis Date    Acute pulmonary embolism (HCC)     Acute respiratory failure with hypoxia (HCC) 03/17/2023    COPD (chronic obstructive pulmonary disease) (HCC)     Depression     Disease of blood and blood forming organ     Elevated blood pressure readings 01/04/2021    Hypertension     Troponin level elevated 01/04/2021      Past Surgical History:   Procedure Laterality Date    KNEE ARTHROSCOPY         Family History   Problem Relation Age of Onset    Breast Cancer Mother         under 50    Cancer Mother     Kidney Disease Father     Breast Cancer Maternal

## 2024-07-22 ENCOUNTER — OFFICE VISIT (OUTPATIENT)
Dept: FAMILY MEDICINE CLINIC | Age: 74
End: 2024-07-22
Payer: MEDICARE

## 2024-07-22 VITALS
HEIGHT: 62 IN | WEIGHT: 263 LBS | BODY MASS INDEX: 48.4 KG/M2 | DIASTOLIC BLOOD PRESSURE: 76 MMHG | OXYGEN SATURATION: 98 % | HEART RATE: 108 BPM | SYSTOLIC BLOOD PRESSURE: 124 MMHG

## 2024-07-22 DIAGNOSIS — I10 ESSENTIAL HYPERTENSION: Primary | ICD-10-CM

## 2024-07-22 DIAGNOSIS — E66.01 CLASS 3 SEVERE OBESITY DUE TO EXCESS CALORIES WITH SERIOUS COMORBIDITY AND BODY MASS INDEX (BMI) OF 50.0 TO 59.9 IN ADULT (HCC): ICD-10-CM

## 2024-07-22 DIAGNOSIS — F32.0 CURRENT MILD EPISODE OF MAJOR DEPRESSIVE DISORDER WITHOUT PRIOR EPISODE (HCC): ICD-10-CM

## 2024-07-22 DIAGNOSIS — M17.0 PRIMARY OSTEOARTHRITIS OF BOTH KNEES: ICD-10-CM

## 2024-07-22 DIAGNOSIS — I26.99 BILATERAL PULMONARY EMBOLISM (HCC): ICD-10-CM

## 2024-07-22 PROCEDURE — 1090F PRES/ABSN URINE INCON ASSESS: CPT | Performed by: STUDENT IN AN ORGANIZED HEALTH CARE EDUCATION/TRAINING PROGRAM

## 2024-07-22 PROCEDURE — 99214 OFFICE O/P EST MOD 30 MIN: CPT | Performed by: STUDENT IN AN ORGANIZED HEALTH CARE EDUCATION/TRAINING PROGRAM

## 2024-07-22 PROCEDURE — 1036F TOBACCO NON-USER: CPT | Performed by: STUDENT IN AN ORGANIZED HEALTH CARE EDUCATION/TRAINING PROGRAM

## 2024-07-22 PROCEDURE — 1123F ACP DISCUSS/DSCN MKR DOCD: CPT | Performed by: STUDENT IN AN ORGANIZED HEALTH CARE EDUCATION/TRAINING PROGRAM

## 2024-07-22 PROCEDURE — 3078F DIAST BP <80 MM HG: CPT | Performed by: STUDENT IN AN ORGANIZED HEALTH CARE EDUCATION/TRAINING PROGRAM

## 2024-07-22 PROCEDURE — 3074F SYST BP LT 130 MM HG: CPT | Performed by: STUDENT IN AN ORGANIZED HEALTH CARE EDUCATION/TRAINING PROGRAM

## 2024-07-22 PROCEDURE — G8399 PT W/DXA RESULTS DOCUMENT: HCPCS | Performed by: STUDENT IN AN ORGANIZED HEALTH CARE EDUCATION/TRAINING PROGRAM

## 2024-07-22 PROCEDURE — G8417 CALC BMI ABV UP PARAM F/U: HCPCS | Performed by: STUDENT IN AN ORGANIZED HEALTH CARE EDUCATION/TRAINING PROGRAM

## 2024-07-22 PROCEDURE — G8427 DOCREV CUR MEDS BY ELIG CLIN: HCPCS | Performed by: STUDENT IN AN ORGANIZED HEALTH CARE EDUCATION/TRAINING PROGRAM

## 2024-07-22 RX ORDER — TIZANIDINE 2 MG/1
2 TABLET ORAL NIGHTLY PRN
Qty: 30 TABLET | Refills: 0 | Status: SHIPPED | OUTPATIENT
Start: 2024-07-22

## 2024-07-22 NOTE — PROGRESS NOTES
03/18/2025    DEXA (modify frequency per FRAX score)  Completed    Hepatitis C screen  Completed    Hepatitis A vaccine  Aged Out    Hepatitis B vaccine  Aged Out    Hib vaccine  Aged Out    Polio vaccine  Aged Out    Meningococcal (ACWY) vaccine  Aged Out    A1C test (Diabetic or Prediabetic)  Discontinued    Lipids  Discontinued    GFR test (Diabetes, CKD 3-4, OR last GFR 15-59)  Discontinued    Breast cancer screen  Discontinued    Colorectal Cancer Screen  Discontinued       Subjective:     Review of Systems   Constitutional:  Negative for appetite change, fatigue and fever.   HENT:  Negative for congestion, ear pain, hearing loss and sore throat.    Eyes:  Negative for discharge and visual disturbance.   Respiratory:  Negative for cough, chest tightness, shortness of breath and wheezing.    Cardiovascular:  Negative for chest pain, palpitations and leg swelling.   Gastrointestinal:  Negative for abdominal pain, constipation, diarrhea, nausea and vomiting.   Genitourinary:  Negative for flank pain, frequency, hematuria and urgency.   Musculoskeletal:  Positive for arthralgias. Negative for gait problem, joint swelling and myalgias.   Skin: Negative.    Neurological:  Negative for dizziness, weakness, numbness and headaches.   Psychiatric/Behavioral:  Positive for dysphoric mood. The patient is nervous/anxious.        Objective:     Physical Exam  Vitals reviewed.   Constitutional:       Appearance: Normal appearance. She is obese.   HENT:      Head: Normocephalic and atraumatic.      Nose: Nose normal.      Mouth/Throat:      Mouth: Mucous membranes are moist.      Pharynx: Oropharynx is clear.   Eyes:      Extraocular Movements: Extraocular movements intact.      Conjunctiva/sclera: Conjunctivae normal.      Pupils: Pupils are equal, round, and reactive to light.   Cardiovascular:      Rate and Rhythm: Normal rate and regular rhythm.      Heart sounds: Normal heart sounds. No murmur heard.     No gallop.

## 2024-07-23 ASSESSMENT — ENCOUNTER SYMPTOMS
WHEEZING: 0
CONSTIPATION: 0
EYE DISCHARGE: 0
NAUSEA: 0
DIARRHEA: 0
SORE THROAT: 0
SHORTNESS OF BREATH: 0
VOMITING: 0
ABDOMINAL PAIN: 0
CHEST TIGHTNESS: 0
COUGH: 0

## 2024-08-01 DIAGNOSIS — M17.0 PRIMARY OSTEOARTHRITIS OF BOTH KNEES: Primary | ICD-10-CM

## 2024-08-01 RX ORDER — BACLOFEN 10 MG/1
10 TABLET ORAL NIGHTLY PRN
Qty: 15 TABLET | Refills: 0 | Status: SHIPPED | OUTPATIENT
Start: 2024-08-01

## 2024-08-10 DIAGNOSIS — I26.99 ACUTE MASSIVE PULMONARY EMBOLISM (HCC): ICD-10-CM

## 2024-08-12 NOTE — TELEPHONE ENCOUNTER
Maureen Dupree is calling to request a refill on the following medication(s):    Medication Request:  Requested Prescriptions     Pending Prescriptions Disp Refills    apixaban (ELIQUIS) 5 MG TABS tablet 60 tablet 5     Sig: Take 1 tablet by mouth 2 times daily       Last Visit Date (If Applicable):  7/22/2024    Next Visit Date:    10/22/2024

## 2024-08-13 ENCOUNTER — HOSPITAL ENCOUNTER (OUTPATIENT)
Age: 74
Setting detail: SPECIMEN
Discharge: HOME | End: 2024-08-13

## 2024-08-13 DIAGNOSIS — N18.31 ANEMIA DUE TO STAGE 3A CHRONIC KIDNEY DISEASE (HCC): ICD-10-CM

## 2024-08-13 DIAGNOSIS — D63.1 ANEMIA DUE TO STAGE 3A CHRONIC KIDNEY DISEASE (HCC): ICD-10-CM

## 2024-08-13 DIAGNOSIS — R73.03 PREDIABETES: ICD-10-CM

## 2024-08-13 DIAGNOSIS — I10 ESSENTIAL HYPERTENSION: Primary | ICD-10-CM

## 2024-08-13 LAB
ANION GAP SERPL CALCULATED.3IONS-SCNC: 13 MMOL/L (ref 9–16)
BASOPHILS # BLD: 0.04 K/UL (ref 0–0.2)
BASOPHILS NFR BLD: 1 % (ref 0–2)
BUN SERPL-MCNC: 19 MG/DL (ref 8–23)
CALCIUM SERPL-MCNC: 9.1 MG/DL (ref 8.6–10.4)
CHLORIDE SERPL-SCNC: 106 MMOL/L (ref 98–107)
CO2 SERPL-SCNC: 22 MMOL/L (ref 20–31)
CREAT SERPL-MCNC: 1 MG/DL (ref 0.5–0.9)
EOSINOPHIL # BLD: 0.07 K/UL (ref 0–0.44)
EOSINOPHILS RELATIVE PERCENT: 1 % (ref 1–4)
ERYTHROCYTE [DISTWIDTH] IN BLOOD BY AUTOMATED COUNT: 18.3 % (ref 11.8–14.4)
EST. AVERAGE GLUCOSE BLD GHB EST-MCNC: 108 MG/DL
FERRITIN SERPL-MCNC: 41 NG/ML (ref 13–150)
GFR, ESTIMATED: 62 ML/MIN/1.73M2
GLUCOSE SERPL-MCNC: 131 MG/DL (ref 74–99)
HBA1C MFR BLD: 5.4 % (ref 4–6)
HCT VFR BLD AUTO: 30.6 % (ref 36.3–47.1)
HGB BLD-MCNC: 9.1 G/DL (ref 11.9–15.1)
IMM GRANULOCYTES # BLD AUTO: 0.08 K/UL (ref 0–0.3)
IMM GRANULOCYTES NFR BLD: 1 %
IRON SATN MFR SERPL: 11 % (ref 20–55)
IRON SERPL-MCNC: 36 UG/DL (ref 37–145)
LYMPHOCYTES NFR BLD: 1.2 K/UL (ref 1.1–3.7)
LYMPHOCYTES RELATIVE PERCENT: 18 % (ref 24–43)
MAGNESIUM SERPL-MCNC: 1.9 MG/DL (ref 1.6–2.4)
MCH RBC QN AUTO: 29 PG (ref 25.2–33.5)
MCHC RBC AUTO-ENTMCNC: 29.7 G/DL (ref 28.4–34.8)
MCV RBC AUTO: 97.5 FL (ref 82.6–102.9)
MONOCYTES NFR BLD: 0.75 K/UL (ref 0.1–1.2)
MONOCYTES NFR BLD: 11 % (ref 3–12)
NEUTROPHILS NFR BLD: 68 % (ref 36–65)
NEUTS SEG NFR BLD: 4.66 K/UL (ref 1.5–8.1)
NRBC BLD-RTO: 0 PER 100 WBC
PLATELET # BLD AUTO: 335 K/UL (ref 138–453)
PMV BLD AUTO: 10.8 FL (ref 8.1–13.5)
POTASSIUM SERPL-SCNC: 3.7 MMOL/L (ref 3.7–5.3)
RBC # BLD AUTO: 3.14 M/UL (ref 3.95–5.11)
RBC # BLD: ABNORMAL 10*6/UL
SODIUM SERPL-SCNC: 141 MMOL/L (ref 136–145)
TIBC SERPL-MCNC: 338 UG/DL (ref 250–450)
UNSATURATED IRON BINDING CAPACITY: 302 UG/DL (ref 112–347)
VIT B12 SERPL-MCNC: 189 PG/ML (ref 232–1245)
WBC OTHER # BLD: 6.8 K/UL (ref 3.5–11.3)

## 2024-08-14 ENCOUNTER — NURSE ONLY (OUTPATIENT)
Dept: FAMILY MEDICINE CLINIC | Age: 74
End: 2024-08-14
Payer: MEDICARE

## 2024-08-14 DIAGNOSIS — D64.9 ANEMIA, UNSPECIFIED TYPE: Primary | ICD-10-CM

## 2024-08-14 DIAGNOSIS — E53.8 B12 DEFICIENCY: Primary | ICD-10-CM

## 2024-08-14 PROCEDURE — 99211 OFF/OP EST MAY X REQ PHY/QHP: CPT | Performed by: STUDENT IN AN ORGANIZED HEALTH CARE EDUCATION/TRAINING PROGRAM

## 2024-08-14 PROCEDURE — 96372 THER/PROPH/DIAG INJ SC/IM: CPT | Performed by: STUDENT IN AN ORGANIZED HEALTH CARE EDUCATION/TRAINING PROGRAM

## 2024-08-14 RX ORDER — CYANOCOBALAMIN 1000 UG/ML
1000 INJECTION, SOLUTION INTRAMUSCULAR; SUBCUTANEOUS ONCE
Status: COMPLETED | OUTPATIENT
Start: 2024-08-14 | End: 2024-08-14

## 2024-08-14 RX ADMIN — CYANOCOBALAMIN 1000 MCG: 1000 INJECTION, SOLUTION INTRAMUSCULAR; SUBCUTANEOUS at 13:01

## 2024-08-15 ENCOUNTER — TELEPHONE (OUTPATIENT)
Dept: GASTROENTEROLOGY | Age: 74
End: 2024-08-15

## 2024-08-15 NOTE — TELEPHONE ENCOUNTER
Patient has a referral for colonoscopy, please call patient at [phone]  [facility]/sc

## 2024-08-20 ENCOUNTER — TELEPHONE (OUTPATIENT)
Dept: FAMILY MEDICINE CLINIC | Age: 74
End: 2024-08-20

## 2024-08-20 DIAGNOSIS — Z12.11 SCREEN FOR COLON CANCER: Primary | ICD-10-CM

## 2024-08-20 NOTE — TELEPHONE ENCOUNTER
Patient would like a referral to Amol Keating MD   P:[phone] F: [phone]  [facility] [address], [address]  [place] [id number]

## 2024-08-26 DIAGNOSIS — M17.0 PRIMARY OSTEOARTHRITIS OF BOTH KNEES: Primary | ICD-10-CM

## 2024-08-26 RX ORDER — GABAPENTIN 100 MG/1
100 CAPSULE ORAL NIGHTLY
Qty: 90 CAPSULE | Refills: 1 | Status: SHIPPED | OUTPATIENT
Start: 2024-08-26 | End: 2025-02-22

## 2024-09-04 DIAGNOSIS — E55.9 VITAMIN D DEFICIENCY: ICD-10-CM

## 2024-09-05 RX ORDER — CHOLECALCIFEROL (VITAMIN D3) 50 MCG
1 TABLET ORAL DAILY
Qty: 30 TABLET | Refills: 5 | Status: SHIPPED | OUTPATIENT
Start: 2024-09-05

## 2024-09-11 DIAGNOSIS — M17.0 PRIMARY OSTEOARTHRITIS OF BOTH KNEES: ICD-10-CM

## 2024-09-11 RX ORDER — TIZANIDINE 2 MG/1
TABLET ORAL
Qty: 30 TABLET | Refills: 0 | Status: SHIPPED | OUTPATIENT
Start: 2024-09-11

## 2024-09-16 ENCOUNTER — OFFICE VISIT (OUTPATIENT)
Dept: FAMILY MEDICINE CLINIC | Age: 74
End: 2024-09-16
Payer: MEDICARE

## 2024-09-16 DIAGNOSIS — E53.8 B12 DEFICIENCY: Primary | ICD-10-CM

## 2024-09-16 PROCEDURE — G8427 DOCREV CUR MEDS BY ELIG CLIN: HCPCS | Performed by: STUDENT IN AN ORGANIZED HEALTH CARE EDUCATION/TRAINING PROGRAM

## 2024-09-16 PROCEDURE — G8417 CALC BMI ABV UP PARAM F/U: HCPCS | Performed by: STUDENT IN AN ORGANIZED HEALTH CARE EDUCATION/TRAINING PROGRAM

## 2024-09-16 PROCEDURE — 99211 OFF/OP EST MAY X REQ PHY/QHP: CPT | Performed by: STUDENT IN AN ORGANIZED HEALTH CARE EDUCATION/TRAINING PROGRAM

## 2024-09-16 PROCEDURE — 96372 THER/PROPH/DIAG INJ SC/IM: CPT | Performed by: STUDENT IN AN ORGANIZED HEALTH CARE EDUCATION/TRAINING PROGRAM

## 2024-09-16 RX ORDER — CYANOCOBALAMIN 1000 UG/ML
1000 INJECTION, SOLUTION INTRAMUSCULAR; SUBCUTANEOUS ONCE
Status: CANCELLED | OUTPATIENT
Start: 2024-09-16 | End: 2024-09-16

## 2024-09-16 RX ORDER — CYANOCOBALAMIN 1000 UG/ML
1000 INJECTION, SOLUTION INTRAMUSCULAR; SUBCUTANEOUS ONCE
Status: COMPLETED | OUTPATIENT
Start: 2024-09-16 | End: 2024-09-16

## 2024-09-16 RX ADMIN — CYANOCOBALAMIN 1000 MCG: 1000 INJECTION, SOLUTION INTRAMUSCULAR; SUBCUTANEOUS at 13:01

## 2024-10-07 ENCOUNTER — PATIENT MESSAGE (OUTPATIENT)
Dept: FAMILY MEDICINE CLINIC | Age: 74
End: 2024-10-07

## 2024-10-07 DIAGNOSIS — D63.1 ANEMIA DUE TO STAGE 3A CHRONIC KIDNEY DISEASE (HCC): ICD-10-CM

## 2024-10-07 DIAGNOSIS — N18.31 ANEMIA DUE TO STAGE 3A CHRONIC KIDNEY DISEASE (HCC): ICD-10-CM

## 2024-10-07 RX ORDER — DOCUSATE SODIUM 100 MG/1
100 CAPSULE, LIQUID FILLED ORAL 2 TIMES DAILY
Qty: 60 CAPSULE | Refills: 3 | Status: SHIPPED | OUTPATIENT
Start: 2024-10-07 | End: 2024-11-06

## 2024-10-07 NOTE — TELEPHONE ENCOUNTER
Maureen Dupree is calling to request a refill on the following medication(s):    Medication Request:  Requested Prescriptions     Pending Prescriptions Disp Refills    docusate sodium (COLACE) 100 MG capsule 60 capsule 3     Sig: Take 1 capsule by mouth 2 times daily       Last Visit Date (If Applicable):  9/16/2024    Next Visit Date:    10/22/2024

## 2024-10-22 ENCOUNTER — OFFICE VISIT (OUTPATIENT)
Dept: FAMILY MEDICINE CLINIC | Age: 74
End: 2024-10-22

## 2024-10-22 VITALS
SYSTOLIC BLOOD PRESSURE: 122 MMHG | HEART RATE: 68 BPM | HEIGHT: 62 IN | WEIGHT: 267 LBS | DIASTOLIC BLOOD PRESSURE: 76 MMHG | BODY MASS INDEX: 49.13 KG/M2 | OXYGEN SATURATION: 100 %

## 2024-10-22 DIAGNOSIS — I10 ESSENTIAL HYPERTENSION: ICD-10-CM

## 2024-10-22 DIAGNOSIS — N18.31 ANEMIA DUE TO STAGE 3A CHRONIC KIDNEY DISEASE (HCC): ICD-10-CM

## 2024-10-22 DIAGNOSIS — D63.1 ANEMIA DUE TO STAGE 3A CHRONIC KIDNEY DISEASE (HCC): ICD-10-CM

## 2024-10-22 DIAGNOSIS — M17.0 PRIMARY OSTEOARTHRITIS OF BOTH KNEES: ICD-10-CM

## 2024-10-22 DIAGNOSIS — E53.8 B12 DEFICIENCY: Primary | ICD-10-CM

## 2024-10-22 RX ORDER — CYANOCOBALAMIN 1000 UG/ML
1000 INJECTION, SOLUTION INTRAMUSCULAR; SUBCUTANEOUS ONCE
Status: COMPLETED | OUTPATIENT
Start: 2024-10-22 | End: 2024-10-22

## 2024-10-22 RX ORDER — GABAPENTIN 100 MG/1
200 CAPSULE ORAL DAILY
Qty: 180 CAPSULE | Refills: 1 | Status: SHIPPED | OUTPATIENT
Start: 2024-10-22 | End: 2024-11-21

## 2024-10-22 RX ADMIN — CYANOCOBALAMIN 1000 MCG: 1000 INJECTION, SOLUTION INTRAMUSCULAR; SUBCUTANEOUS at 15:34

## 2024-10-22 ASSESSMENT — ENCOUNTER SYMPTOMS
COUGH: 0
EYE DISCHARGE: 0
DIARRHEA: 0
SORE THROAT: 0
ABDOMINAL PAIN: 0
CONSTIPATION: 0
NAUSEA: 0
WHEEZING: 0
VOMITING: 0
SHORTNESS OF BREATH: 0
CHEST TIGHTNESS: 0

## 2024-10-22 NOTE — PROGRESS NOTES
[facility]  [address]  [address]  [facility] [address]  Dept: [phone]  Dept Fax: [phone]    Maureen Dupree is a 74 y.o. female who is a Established patient, who presents today for her medical conditions/complaints as noted below:  Chief Complaint   Patient presents with    Hypertension         HPI:     HPI    History of Present Illness  She is here today to follow-up on hypertension.    She reports persistent fatigue and tiredness, which have not improved despite regular B12 injections. The first injection seemed to have a positive effect, but the second one did not result in any noticeable changes. She is scheduled to receive her third injection today. She has declined a sleep study as she does not believe she has any sleep issues. She reports no snoring but admits to feeling sleepy during the day.   She has history of iron deficiency anemia, has been taking oral iron supplements.  She is currently taking gabapentin 100 mg, which provides some relief from her pain, but she would like to try higher dose  She is following with [facility] for her knee arthritis.     She is under a lot of stress lately, her daughter lost her spouse and is now currently taking care of her young kids by herself.  She states that it is hard for her to watch her daughter doing 3 jobs trying to provide for her family.        Hemoglobin A1C (%)   Date Value   08/13/2024 5.4   04/19/2024 6.0   09/14/2023 5.5             ( goal A1Cis < 7)   No components found for: \"LABMICR\"  No components found for: \"LDLCHOLESTEROL\", \"LDLCALC\"    (goal LDL is <100)   AST (U/L)   Date Value   04/19/2024 24     ALT (U/L)   Date Value   04/19/2024 15     BUN (mg/dL)   Date Value   08/13/2024 19     BP Readings from Last 3 Encounters:   10/22/24 122/76   07/22/24 124/76   06/19/24 124/80          (goal 120/80)    Past Medical History:   Diagnosis Date    Acute pulmonary embolism (HCC)     Acute

## 2024-10-23 DIAGNOSIS — I10 ESSENTIAL HYPERTENSION: ICD-10-CM

## 2024-10-23 RX ORDER — HYDROCHLOROTHIAZIDE 25 MG/1
TABLET ORAL
Qty: 90 TABLET | Refills: 1 | Status: SHIPPED | OUTPATIENT
Start: 2024-10-23

## 2024-10-23 NOTE — TELEPHONE ENCOUNTER
Maureen Dupree is calling to request a refill on the following medication(s):    Medication Request:  Requested Prescriptions     Pending Prescriptions Disp Refills    hydroCHLOROthiazide (HYDRODIURIL) 25 MG tablet [Pharmacy Med Name: hydroCHLOROthiazide 25 MG TABLET] 90 tablet 1     Sig: TAKE 1 TABLET BY MOUTH EVERY MORNING       Last Visit Date (If Applicable):  10/22/2024    Next Visit Date:    1/23/2025

## 2024-10-25 ENCOUNTER — HOSPITAL ENCOUNTER (OUTPATIENT)
Age: 74
Setting detail: SPECIMEN
Discharge: HOME | End: 2024-10-25

## 2024-10-25 DIAGNOSIS — D63.1 ANEMIA DUE TO STAGE 3A CHRONIC KIDNEY DISEASE (HCC): ICD-10-CM

## 2024-10-25 DIAGNOSIS — N18.31 ANEMIA DUE TO STAGE 3A CHRONIC KIDNEY DISEASE (HCC): ICD-10-CM

## 2024-10-25 DIAGNOSIS — E53.8 B12 DEFICIENCY: ICD-10-CM

## 2024-10-25 LAB
BASOPHILS # BLD: 0.04 K/UL (ref 0–0.2)
BASOPHILS NFR BLD: 1 % (ref 0–2)
EOSINOPHIL # BLD: 0.04 K/UL (ref 0–0.44)
EOSINOPHILS RELATIVE PERCENT: 1 % (ref 1–4)
ERYTHROCYTE [DISTWIDTH] IN BLOOD BY AUTOMATED COUNT: 13.3 % (ref 11.8–14.4)
FERRITIN SERPL-MCNC: 23 NG/ML
HCT VFR BLD AUTO: 30.7 % (ref 36.3–47.1)
HGB BLD-MCNC: 9.4 G/DL (ref 11.9–15.1)
IMM GRANULOCYTES # BLD AUTO: 0.04 K/UL (ref 0–0.3)
IMM GRANULOCYTES NFR BLD: 1 %
IRON SATN MFR SERPL: 53 % (ref 20–55)
IRON SERPL-MCNC: 178 UG/DL (ref 37–145)
LYMPHOCYTES NFR BLD: 1.06 K/UL (ref 1.1–3.7)
LYMPHOCYTES RELATIVE PERCENT: 19 % (ref 24–43)
MCH RBC QN AUTO: 30.4 PG (ref 25.2–33.5)
MCHC RBC AUTO-ENTMCNC: 30.6 G/DL (ref 28.4–34.8)
MCV RBC AUTO: 99.4 FL (ref 82.6–102.9)
MONOCYTES NFR BLD: 0.59 K/UL (ref 0.1–1.2)
MONOCYTES NFR BLD: 10 % (ref 3–12)
NEUTROPHILS NFR BLD: 68 % (ref 36–65)
NEUTS SEG NFR BLD: 3.91 K/UL (ref 1.5–8.1)
NRBC BLD-RTO: 0 PER 100 WBC
PLATELET # BLD AUTO: 344 K/UL (ref 138–453)
PMV BLD AUTO: 10.9 FL (ref 8.1–13.5)
RBC # BLD AUTO: 3.09 M/UL (ref 3.95–5.11)
TIBC SERPL-MCNC: 333 UG/DL (ref 250–450)
UNSATURATED IRON BINDING CAPACITY: 155 UG/DL (ref 112–347)
VIT B12 SERPL-MCNC: 730 PG/ML (ref 232–1245)
WBC OTHER # BLD: 5.7 K/UL (ref 3.5–11.3)

## 2024-10-28 DIAGNOSIS — D64.9 ANEMIA, NORMOCYTIC NORMOCHROMIC: Primary | ICD-10-CM

## 2024-12-06 ENCOUNTER — TELEPHONE (OUTPATIENT)
Dept: ONCOLOGY | Age: 74
End: 2024-12-06

## 2024-12-06 ENCOUNTER — INITIAL CONSULT (OUTPATIENT)
Dept: ONCOLOGY | Age: 74
End: 2024-12-06

## 2024-12-06 VITALS
TEMPERATURE: 96.6 F | DIASTOLIC BLOOD PRESSURE: 84 MMHG | OXYGEN SATURATION: 99 % | HEART RATE: 98 BPM | SYSTOLIC BLOOD PRESSURE: 138 MMHG | BODY MASS INDEX: 47.55 KG/M2 | WEIGHT: 260 LBS | RESPIRATION RATE: 18 BRPM

## 2024-12-06 DIAGNOSIS — E53.8 VITAMIN B12 DEFICIENCY: ICD-10-CM

## 2024-12-06 DIAGNOSIS — D64.9 NORMOCYTIC ANEMIA: Primary | ICD-10-CM

## 2024-12-06 DIAGNOSIS — K90.9 IRON MALABSORPTION: ICD-10-CM

## 2024-12-06 DIAGNOSIS — D50.8 IRON DEFICIENCY ANEMIA SECONDARY TO INADEQUATE DIETARY IRON INTAKE: ICD-10-CM

## 2024-12-06 DIAGNOSIS — E61.1 IRON DEFICIENCY: ICD-10-CM

## 2024-12-06 RX ORDER — HYDROCORTISONE SODIUM SUCCINATE 100 MG/2ML
100 INJECTION INTRAMUSCULAR; INTRAVENOUS
OUTPATIENT
Start: 2024-12-06

## 2024-12-06 RX ORDER — SODIUM CHLORIDE 0.9 % (FLUSH) 0.9 %
5-40 SYRINGE (ML) INJECTION PRN
OUTPATIENT
Start: 2024-12-06

## 2024-12-06 RX ORDER — HEPARIN SODIUM (PORCINE) LOCK FLUSH IV SOLN 100 UNIT/ML 100 UNIT/ML
500 SOLUTION INTRAVENOUS PRN
OUTPATIENT
Start: 2024-12-06

## 2024-12-06 RX ORDER — ALBUTEROL SULFATE 90 UG/1
4 INHALANT RESPIRATORY (INHALATION) PRN
OUTPATIENT
Start: 2024-12-06

## 2024-12-06 RX ORDER — SODIUM CHLORIDE 9 MG/ML
INJECTION, SOLUTION INTRAVENOUS CONTINUOUS
OUTPATIENT
Start: 2024-12-06

## 2024-12-06 RX ORDER — SODIUM CHLORIDE 9 MG/ML
5-250 INJECTION, SOLUTION INTRAVENOUS PRN
OUTPATIENT
Start: 2024-12-06

## 2024-12-06 RX ORDER — EPINEPHRINE 1 MG/ML
0.3 INJECTION, SOLUTION, CONCENTRATE INTRAVENOUS PRN
OUTPATIENT
Start: 2024-12-06

## 2024-12-06 RX ORDER — MAGNESIUM 200 MG
1000 TABLET ORAL DAILY
Qty: 60 TABLET | Refills: 2 | Status: SHIPPED | OUTPATIENT
Start: 2024-12-06

## 2024-12-06 RX ORDER — ONDANSETRON 2 MG/ML
8 INJECTION INTRAMUSCULAR; INTRAVENOUS
OUTPATIENT
Start: 2024-12-06

## 2024-12-06 RX ORDER — DIPHENHYDRAMINE HYDROCHLORIDE 50 MG/ML
50 INJECTION INTRAMUSCULAR; INTRAVENOUS
OUTPATIENT
Start: 2024-12-06

## 2024-12-06 RX ORDER — FAMOTIDINE 10 MG/ML
20 INJECTION, SOLUTION INTRAVENOUS
OUTPATIENT
Start: 2024-12-06

## 2024-12-06 RX ORDER — ACETAMINOPHEN 325 MG/1
650 TABLET ORAL
OUTPATIENT
Start: 2024-12-06

## 2024-12-06 NOTE — TELEPHONE ENCOUNTER
Instructions   from Dr. Stephanie Torrez MD    IV iron infusion soon at [facility]  Vitamin B12 SL prescription  RV 3 months at [facility] with CBC, Iron studies, Vitamin B12/ folate before RV    Faxed over pts AVS to [facility] for them to reach out and schedule appts with pt

## 2024-12-06 NOTE — PATIENT INSTRUCTIONS
IV iron infusion soon at [facility]  Vitamin B12 SL prescription  RV 3 months at [facility] with CBC, Iron studies, Vitamin B12/ folate before RV

## 2024-12-06 NOTE — PROGRESS NOTES
injections of vitamin B12.  She also received oral iron but she could not tolerated and follow-up lab test showed high iron level secondary to the recent intake of oral iron tablets.  Hemoglobin continued to be very low.  So this is consistent with partially treated iron deficiency and B12 deficiency.  I will proceed with vitamin B12 treatment using sublingual tablets daily and she will need IV iron infusion since she could not tolerate the oral iron.  We will monitor response to treatment with follow-up labs including vitamin B12 folate and iron studies in about 3 months.  Discussed with the patient further need for GI evaluation with colonoscopy and possible EGD but she declined.  She had Cologuard test done a year ago which was negative.  We will watch for the results of these treatment and we will discuss with her again the need of the GI evaluation especially if no satisfactory response.  Patient's questions were answered to the best of her satisfaction and she verbalized full understanding and agreement.    I spent a total of 60 minutes on the date of the service which included preparing to see the patient, face-to-face patient care, completing clinical documentation, obtaining and/or reviewing separately obtained history, performing a medically appropriate examination, counseling and educating the patient/family/caregiver, ordering medications, tests, or procedures, communicating with other HCPs (not separately reported), independently interpreting results (not separately reported), communicating results to the patient/family/caregiver and care coordination (not separately reported).                            Stephanie Fletcher MD                          [facility]                            This note is created with the assistance of a speech recognition program.  While intending to generate a document that actually reflects the content of the visit, the document can still have some errors

## 2024-12-11 ENCOUNTER — TELEPHONE (OUTPATIENT)
Dept: ONCOLOGY | Age: 74
End: 2024-12-11

## 2024-12-20 ENCOUNTER — HOSPITAL ENCOUNTER (OUTPATIENT)
Dept: INFUSION THERAPY | Facility: MEDICAL CENTER | Age: 74
Discharge: HOME | End: 2024-12-20
Payer: MEDICARE

## 2024-12-20 ENCOUNTER — HOSPITAL ENCOUNTER (OUTPATIENT)
Dept: MAMMOGRAPHY | Age: 74
Discharge: HOME | End: 2024-12-22
Payer: MEDICARE

## 2024-12-20 VITALS — SYSTOLIC BLOOD PRESSURE: 126 MMHG | HEART RATE: 74 BPM | TEMPERATURE: 97.9 F | DIASTOLIC BLOOD PRESSURE: 78 MMHG

## 2024-12-20 VITALS — BODY MASS INDEX: 49.09 KG/M2 | HEIGHT: 61 IN | WEIGHT: 260 LBS

## 2024-12-20 DIAGNOSIS — K90.9 IRON MALABSORPTION: Primary | ICD-10-CM

## 2024-12-20 DIAGNOSIS — D50.8 IRON DEFICIENCY ANEMIA SECONDARY TO INADEQUATE DIETARY IRON INTAKE: ICD-10-CM

## 2024-12-20 DIAGNOSIS — D64.9 NORMOCYTIC ANEMIA: ICD-10-CM

## 2024-12-20 DIAGNOSIS — Z12.31 VISIT FOR SCREENING MAMMOGRAM: ICD-10-CM

## 2024-12-20 PROCEDURE — 77067 SCR MAMMO BI INCL CAD: CPT

## 2024-12-20 PROCEDURE — 96365 THER/PROPH/DIAG IV INF INIT: CPT

## 2024-12-20 PROCEDURE — 77063 BREAST TOMOSYNTHESIS BI: CPT

## 2024-12-20 PROCEDURE — 6360000002 HC RX W HCPCS: Performed by: INTERNAL MEDICINE

## 2024-12-20 PROCEDURE — 2580000003 HC RX 258: Performed by: INTERNAL MEDICINE

## 2024-12-20 RX ORDER — SODIUM CHLORIDE 9 MG/ML
5-250 INJECTION, SOLUTION INTRAVENOUS PRN
OUTPATIENT
Start: 2024-12-27

## 2024-12-20 RX ORDER — SODIUM CHLORIDE 9 MG/ML
INJECTION, SOLUTION INTRAVENOUS CONTINUOUS
OUTPATIENT
Start: 2024-12-27

## 2024-12-20 RX ORDER — ALBUTEROL SULFATE 90 UG/1
4 INHALANT RESPIRATORY (INHALATION) PRN
OUTPATIENT
Start: 2024-12-27

## 2024-12-20 RX ORDER — ONDANSETRON 2 MG/ML
8 INJECTION INTRAMUSCULAR; INTRAVENOUS
OUTPATIENT
Start: 2024-12-27

## 2024-12-20 RX ORDER — HEPARIN 100 UNIT/ML
500 SYRINGE INTRAVENOUS PRN
OUTPATIENT
Start: 2024-12-27

## 2024-12-20 RX ORDER — SODIUM CHLORIDE 9 MG/ML
5-250 INJECTION, SOLUTION INTRAVENOUS PRN
Status: CANCELLED | OUTPATIENT
Start: 2024-12-27

## 2024-12-20 RX ORDER — DIPHENHYDRAMINE HYDROCHLORIDE 50 MG/ML
50 INJECTION INTRAMUSCULAR; INTRAVENOUS
OUTPATIENT
Start: 2024-12-27

## 2024-12-20 RX ORDER — SODIUM CHLORIDE 0.9 % (FLUSH) 0.9 %
5-40 SYRINGE (ML) INJECTION PRN
OUTPATIENT
Start: 2024-12-27

## 2024-12-20 RX ORDER — FAMOTIDINE 10 MG/ML
20 INJECTION, SOLUTION INTRAVENOUS
OUTPATIENT
Start: 2024-12-27

## 2024-12-20 RX ORDER — ACETAMINOPHEN 325 MG/1
650 TABLET ORAL
OUTPATIENT
Start: 2024-12-27

## 2024-12-20 RX ORDER — SODIUM CHLORIDE 9 MG/ML
5-250 INJECTION, SOLUTION INTRAVENOUS PRN
Status: DISCONTINUED | OUTPATIENT
Start: 2024-12-20 | End: 2024-12-21 | Stop reason: HOSPADM

## 2024-12-20 RX ORDER — HYDROCORTISONE SODIUM SUCCINATE 100 MG/2ML
100 INJECTION INTRAMUSCULAR; INTRAVENOUS
OUTPATIENT
Start: 2024-12-27

## 2024-12-20 RX ORDER — EPINEPHRINE 1 MG/ML
0.3 INJECTION, SOLUTION INTRAMUSCULAR; SUBCUTANEOUS PRN
OUTPATIENT
Start: 2024-12-27

## 2024-12-20 RX ADMIN — SODIUM CHLORIDE 100 ML/HR: 9 INJECTION, SOLUTION INTRAVENOUS at 12:29

## 2024-12-20 RX ADMIN — FERRIC CARBOXYMALTOSE INJECTION 750 MG: 50 INJECTION, SOLUTION INTRAVENOUS at 12:41

## 2024-12-20 NOTE — PROGRESS NOTES
Patient arrives ambulatory per self for 1/2 Injectafer  Denies complaint/concern  VS as charted  Educated patient using [facility] printed materials on possible side effects & monitoring IV site, s/s allergic reaction;pt states understanding  PIV established per policy  Injectafer infused without adverse reaction  Line flushed while patient monitored  Post infusion VSS  IV removed & pressure dressing applied  Pt discharged ambulatory per self  Returns 12/27 for 2/2 Injectafer

## 2024-12-27 ENCOUNTER — HOSPITAL ENCOUNTER (OUTPATIENT)
Dept: INFUSION THERAPY | Facility: MEDICAL CENTER | Age: 74
Discharge: HOME | End: 2024-12-27
Payer: MEDICARE

## 2024-12-27 VITALS
SYSTOLIC BLOOD PRESSURE: 128 MMHG | RESPIRATION RATE: 16 BRPM | DIASTOLIC BLOOD PRESSURE: 84 MMHG | TEMPERATURE: 97.3 F | HEART RATE: 109 BPM

## 2024-12-27 DIAGNOSIS — D64.9 NORMOCYTIC ANEMIA: ICD-10-CM

## 2024-12-27 DIAGNOSIS — K90.9 IRON MALABSORPTION: Primary | ICD-10-CM

## 2024-12-27 DIAGNOSIS — D50.8 IRON DEFICIENCY ANEMIA SECONDARY TO INADEQUATE DIETARY IRON INTAKE: ICD-10-CM

## 2024-12-27 PROCEDURE — 96365 THER/PROPH/DIAG IV INF INIT: CPT

## 2024-12-27 PROCEDURE — 2580000003 HC RX 258: Performed by: INTERNAL MEDICINE

## 2024-12-27 PROCEDURE — 6360000002 HC RX W HCPCS: Performed by: INTERNAL MEDICINE

## 2024-12-27 RX ORDER — ONDANSETRON 2 MG/ML
8 INJECTION INTRAMUSCULAR; INTRAVENOUS
OUTPATIENT
Start: 2025-01-03

## 2024-12-27 RX ORDER — ALBUTEROL SULFATE 90 UG/1
4 INHALANT RESPIRATORY (INHALATION) PRN
OUTPATIENT
Start: 2025-01-03

## 2024-12-27 RX ORDER — SODIUM CHLORIDE 9 MG/ML
5-250 INJECTION, SOLUTION INTRAVENOUS PRN
Status: CANCELLED | OUTPATIENT
Start: 2025-01-03

## 2024-12-27 RX ORDER — SODIUM CHLORIDE 9 MG/ML
5-250 INJECTION, SOLUTION INTRAVENOUS PRN
Status: DISCONTINUED | OUTPATIENT
Start: 2024-12-27 | End: 2024-12-28 | Stop reason: HOSPADM

## 2024-12-27 RX ORDER — ACETAMINOPHEN 325 MG/1
650 TABLET ORAL
OUTPATIENT
Start: 2025-01-03

## 2024-12-27 RX ORDER — SODIUM CHLORIDE 9 MG/ML
INJECTION, SOLUTION INTRAVENOUS CONTINUOUS
OUTPATIENT
Start: 2025-01-03

## 2024-12-27 RX ORDER — SODIUM CHLORIDE 9 MG/ML
5-250 INJECTION, SOLUTION INTRAVENOUS PRN
OUTPATIENT
Start: 2025-01-03

## 2024-12-27 RX ORDER — EPINEPHRINE 1 MG/ML
0.3 INJECTION, SOLUTION INTRAMUSCULAR; SUBCUTANEOUS PRN
OUTPATIENT
Start: 2025-01-03

## 2024-12-27 RX ORDER — SODIUM CHLORIDE 0.9 % (FLUSH) 0.9 %
5-40 SYRINGE (ML) INJECTION PRN
OUTPATIENT
Start: 2025-01-03

## 2024-12-27 RX ORDER — HYDROCORTISONE SODIUM SUCCINATE 100 MG/2ML
100 INJECTION INTRAMUSCULAR; INTRAVENOUS
OUTPATIENT
Start: 2025-01-03

## 2024-12-27 RX ORDER — DIPHENHYDRAMINE HYDROCHLORIDE 50 MG/ML
50 INJECTION INTRAMUSCULAR; INTRAVENOUS
OUTPATIENT
Start: 2025-01-03

## 2024-12-27 RX ORDER — HEPARIN 100 UNIT/ML
500 SYRINGE INTRAVENOUS PRN
OUTPATIENT
Start: 2025-01-03

## 2024-12-27 RX ORDER — FAMOTIDINE 10 MG/ML
20 INJECTION, SOLUTION INTRAVENOUS
OUTPATIENT
Start: 2025-01-03

## 2024-12-27 RX ADMIN — SODIUM CHLORIDE 50 ML/HR: 9 INJECTION, SOLUTION INTRAVENOUS at 08:21

## 2024-12-27 RX ADMIN — FERRIC CARBOXYMALTOSE INJECTION 750 MG: 50 INJECTION, SOLUTION INTRAVENOUS at 08:45

## 2024-12-27 NOTE — PROGRESS NOTES
Pt arrives ambulatory for 2 of 2  injectafer infusion.  Pt states that she tolerated previous infusion well , no adverse reaction  Continues to be tired but denies other complaint or concern.  IV started per protocol   Injectafer infused with no adverse reaction.  Pt monitored post infusion for 30 minutes with no adverse reaction.  Pt discharged off unit .

## 2025-01-13 DIAGNOSIS — N18.31 ANEMIA DUE TO STAGE 3A CHRONIC KIDNEY DISEASE (HCC): ICD-10-CM

## 2025-01-13 DIAGNOSIS — D63.1 ANEMIA DUE TO STAGE 3A CHRONIC KIDNEY DISEASE (HCC): ICD-10-CM

## 2025-01-13 RX ORDER — DOCUSATE SODIUM 100 MG/1
100 CAPSULE, LIQUID FILLED ORAL 2 TIMES DAILY
Qty: 180 CAPSULE | Refills: 1 | Status: SHIPPED | OUTPATIENT
Start: 2025-01-13

## 2025-01-13 NOTE — TELEPHONE ENCOUNTER
Maureen Dupree is calling to request a refill on the following medication(s):    Medication Request:  Requested Prescriptions     Pending Prescriptions Disp Refills    docusate sodium (COLACE) 100 MG capsule [Pharmacy Med Name: DOCUSATE SODIUM 100 MG SOFTGEL] 180 capsule      Sig: TAKE 1 CAPSULE BY MOUTH 2 TIMES A DAY       Last Visit Date (If Applicable):  10/22/2024    Next Visit Date:    1/23/2025

## 2025-01-23 ENCOUNTER — TELEMEDICINE (OUTPATIENT)
Dept: FAMILY MEDICINE CLINIC | Age: 75
End: 2025-01-23

## 2025-01-23 DIAGNOSIS — N18.31 ANEMIA DUE TO STAGE 3A CHRONIC KIDNEY DISEASE (HCC): ICD-10-CM

## 2025-01-23 DIAGNOSIS — Z00.00 MEDICARE ANNUAL WELLNESS VISIT, SUBSEQUENT: Primary | ICD-10-CM

## 2025-01-23 DIAGNOSIS — D63.1 ANEMIA DUE TO STAGE 3A CHRONIC KIDNEY DISEASE (HCC): ICD-10-CM

## 2025-01-23 SDOH — ECONOMIC STABILITY: FOOD INSECURITY: WITHIN THE PAST 12 MONTHS, THE FOOD YOU BOUGHT JUST DIDN'T LAST AND YOU DIDN'T HAVE MONEY TO GET MORE.: NEVER TRUE

## 2025-01-23 SDOH — ECONOMIC STABILITY: FOOD INSECURITY: WITHIN THE PAST 12 MONTHS, YOU WORRIED THAT YOUR FOOD WOULD RUN OUT BEFORE YOU GOT MONEY TO BUY MORE.: NEVER TRUE

## 2025-01-23 ASSESSMENT — PATIENT HEALTH QUESTIONNAIRE - PHQ9
SUM OF ALL RESPONSES TO PHQ QUESTIONS 1-9: 0
7. TROUBLE CONCENTRATING ON THINGS, SUCH AS READING THE NEWSPAPER OR WATCHING TELEVISION: NOT AT ALL
SUM OF ALL RESPONSES TO PHQ QUESTIONS 1-9: 0
4. FEELING TIRED OR HAVING LITTLE ENERGY: NOT AT ALL
5. POOR APPETITE OR OVEREATING: NOT AT ALL
2. FEELING DOWN, DEPRESSED OR HOPELESS: NOT AT ALL
6. FEELING BAD ABOUT YOURSELF - OR THAT YOU ARE A FAILURE OR HAVE LET YOURSELF OR YOUR FAMILY DOWN: NOT AT ALL
1. LITTLE INTEREST OR PLEASURE IN DOING THINGS: NOT AT ALL
9. THOUGHTS THAT YOU WOULD BE BETTER OFF DEAD, OR OF HURTING YOURSELF: NOT AT ALL
SUM OF ALL RESPONSES TO PHQ9 QUESTIONS 1 & 2: 0
10. IF YOU CHECKED OFF ANY PROBLEMS, HOW DIFFICULT HAVE THESE PROBLEMS MADE IT FOR YOU TO DO YOUR WORK, TAKE CARE OF THINGS AT HOME, OR GET ALONG WITH OTHER PEOPLE: NOT DIFFICULT AT ALL
SUM OF ALL RESPONSES TO PHQ QUESTIONS 1-9: 0
SUM OF ALL RESPONSES TO PHQ QUESTIONS 1-9: 0
8. MOVING OR SPEAKING SO SLOWLY THAT OTHER PEOPLE COULD HAVE NOTICED. OR THE OPPOSITE, BEING SO FIGETY OR RESTLESS THAT YOU HAVE BEEN MOVING AROUND A LOT MORE THAN USUAL: NOT AT ALL
3. TROUBLE FALLING OR STAYING ASLEEP: NOT AT ALL

## 2025-01-23 ASSESSMENT — LIFESTYLE VARIABLES
HOW OFTEN DO YOU HAVE A DRINK CONTAINING ALCOHOL: MONTHLY OR LESS
HOW MANY STANDARD DRINKS CONTAINING ALCOHOL DO YOU HAVE ON A TYPICAL DAY: 1 OR 2

## 2025-01-23 NOTE — PATIENT INSTRUCTIONS
instruction, always ask your healthcare professional. [employer], disclaims any warranty or liability for your use of this information.         Learning About Being Active as an Older Adult  Why is being active important as you get older?     Being active is one of the best things you can do for your health. And it's never too late to start. Being active--or getting active, if you aren't already--has definite benefits. It can:  Give you more energy,  Keep your mind sharp.  Improve balance to reduce your risk of falls.  Help you manage chronic illness with fewer medicines.  No matter how old you are, how fit you are, or what health problems you have, there is a form of activity that will work for you. And the more physical activity you can do, the better your overall health will be.  What kinds of activity can help you stay healthy?  Being more active will make your daily activities easier. Physical activity includes planned exercise and things you do in daily life. There are four types of activity:  Aerobic.  Doing aerobic activity makes your heart and lungs strong.  Includes walking, dancing, and gardening.  Aim for at least 2½ hours spread throughout the week.  It improves your energy and can help you sleep better.  Muscle-strengthening.  This type of activity can help maintain muscle and strengthen bones.  Includes climbing stairs, using resistance bands, and lifting or carrying heavy loads.  Aim for at least twice a week.  It can help protect the knees and other joints.  Stretching.  Stretching gives you better range of motion in joints and muscles.  Includes upper arm stretches, calf stretches, and gentle yoga.  Aim for at least twice a week, preferably after your muscles are warmed up from other activities.  It can help you function better in daily life.  Balancing.  This helps you stay coordinated and have good posture.  Includes heel-to-toe walking, leo chi, and certain types of yoga.  Aim for at

## 2025-01-23 NOTE — PROGRESS NOTES
Medicare Annual Wellness Visit    Maureen Dupree is here for Medicare AWV    Assessment & Plan   Medicare annual wellness visit, subsequent  Anemia due to stage 3a chronic kidney disease (HCC)     Return in 6 months (on 7/23/2025).     Subjective   The following acute and/or chronic problems were also addressed today:  She has been doing well overall and denies any issues or concerns.  She has been following with hematology for her anemia and has been receiving iron transfusions after which her leg pain has improved significantly and she no longer needs to take muscle relaxers or gabapentin.  She feels that her energy level still have not improved much.    Patient's complete Health Risk Assessment and screening values have been reviewed and are found in Flowsheets. The following problems were reviewed today and where indicated follow up appointments were made and/or referrals ordered.    Positive Risk Factor Screenings with Interventions:              Inactivity:  On average, how many days per week do you engage in moderate to strenuous exercise (like a brisk walk)?: 0 days (!) Abnormal  On average, how many minutes do you engage in exercise at this level?: 0 min  Interventions:  Patient declined any further interventions or treatment    Poor Eating Habits/Diet:  Do you eat balanced/healthy meals regularly?: (!) No  Interventions:  Patient declines any further evaluation or treatment    Abnormal BMI (obese):  There is no height or weight on file to calculate BMI. (!) Abnormal  Interventions:  Patient declines any further evaluation or treatment        Dentist Screen:  Have you seen the dentist within the past year?: (!) No    Intervention:  Advised to schedule with their dentist      Safety:  Do you have non-slip mats or non-slip surfaces or shower bars or grab bars in your shower or bathtub?: (!) No  Interventions:  Patient declined any further interventions or treatment     Advanced Directives:  Do you have a

## 2025-02-25 ENCOUNTER — HOSPITAL ENCOUNTER (OUTPATIENT)
Age: 75
Setting detail: SPECIMEN
Discharge: HOME | End: 2025-02-25

## 2025-02-25 DIAGNOSIS — D64.9 NORMOCYTIC ANEMIA: ICD-10-CM

## 2025-02-25 DIAGNOSIS — E61.1 IRON DEFICIENCY: ICD-10-CM

## 2025-02-25 DIAGNOSIS — E53.8 VITAMIN B12 DEFICIENCY: ICD-10-CM

## 2025-02-25 LAB
BASOPHILS # BLD: 0.04 K/UL (ref 0–0.2)
BASOPHILS NFR BLD: 1 % (ref 0–2)
EOSINOPHIL # BLD: 0.03 K/UL (ref 0–0.44)
EOSINOPHILS RELATIVE PERCENT: 0 % (ref 1–4)
ERYTHROCYTE [DISTWIDTH] IN BLOOD BY AUTOMATED COUNT: 16.9 % (ref 11.8–14.4)
FERRITIN SERPL-MCNC: 123 NG/ML
FOLATE SERPL-MCNC: 12.3 NG/ML (ref 4.8–24.2)
HCT VFR BLD AUTO: 30.6 % (ref 36.3–47.1)
HGB BLD-MCNC: 9.7 G/DL (ref 11.9–15.1)
IMM GRANULOCYTES # BLD AUTO: 0.07 K/UL (ref 0–0.3)
IMM GRANULOCYTES NFR BLD: 1 %
IRON SATN MFR SERPL: 16 % (ref 20–55)
IRON SERPL-MCNC: 49 UG/DL (ref 37–145)
LYMPHOCYTES NFR BLD: 0.83 K/UL (ref 1.1–3.7)
LYMPHOCYTES RELATIVE PERCENT: 12 % (ref 24–43)
MCH RBC QN AUTO: 30.6 PG (ref 25.2–33.5)
MCHC RBC AUTO-ENTMCNC: 31.7 G/DL (ref 28.4–34.8)
MCV RBC AUTO: 96.5 FL (ref 82.6–102.9)
MONOCYTES NFR BLD: 0.64 K/UL (ref 0.1–1.2)
MONOCYTES NFR BLD: 9 % (ref 3–12)
NEUTROPHILS NFR BLD: 77 % (ref 36–65)
NEUTS SEG NFR BLD: 5.59 K/UL (ref 1.5–8.1)
NRBC BLD-RTO: 0 PER 100 WBC
PLATELET # BLD AUTO: 315 K/UL (ref 138–453)
PMV BLD AUTO: 11.1 FL (ref 8.1–13.5)
RBC # BLD AUTO: 3.17 M/UL (ref 3.95–5.11)
RBC # BLD: ABNORMAL 10*6/UL
TIBC SERPL-MCNC: 298 UG/DL (ref 250–450)
UNSATURATED IRON BINDING CAPACITY: 249 UG/DL (ref 112–347)
VIT B12 SERPL-MCNC: 1243 PG/ML (ref 232–1245)
WBC OTHER # BLD: 7.2 K/UL (ref 3.5–11.3)

## 2025-03-06 ENCOUNTER — OFFICE VISIT (OUTPATIENT)
Dept: ONCOLOGY | Age: 75
End: 2025-03-06
Payer: MEDICARE

## 2025-03-06 ENCOUNTER — TELEPHONE (OUTPATIENT)
Dept: ONCOLOGY | Age: 75
End: 2025-03-06

## 2025-03-06 VITALS
RESPIRATION RATE: 16 BRPM | DIASTOLIC BLOOD PRESSURE: 73 MMHG | BODY MASS INDEX: 49.5 KG/M2 | SYSTOLIC BLOOD PRESSURE: 132 MMHG | TEMPERATURE: 97.1 F | WEIGHT: 262 LBS | HEART RATE: 95 BPM

## 2025-03-06 DIAGNOSIS — D50.8 IRON DEFICIENCY ANEMIA SECONDARY TO INADEQUATE DIETARY IRON INTAKE: ICD-10-CM

## 2025-03-06 DIAGNOSIS — D64.9 NORMOCYTIC ANEMIA: Primary | ICD-10-CM

## 2025-03-06 DIAGNOSIS — E53.8 VITAMIN B12 DEFICIENCY: ICD-10-CM

## 2025-03-06 PROCEDURE — G8399 PT W/DXA RESULTS DOCUMENT: HCPCS | Performed by: INTERNAL MEDICINE

## 2025-03-06 PROCEDURE — 1036F TOBACCO NON-USER: CPT | Performed by: INTERNAL MEDICINE

## 2025-03-06 PROCEDURE — 3078F DIAST BP <80 MM HG: CPT | Performed by: INTERNAL MEDICINE

## 2025-03-06 PROCEDURE — 99214 OFFICE O/P EST MOD 30 MIN: CPT | Performed by: INTERNAL MEDICINE

## 2025-03-06 PROCEDURE — 1123F ACP DISCUSS/DSCN MKR DOCD: CPT | Performed by: INTERNAL MEDICINE

## 2025-03-06 PROCEDURE — 3075F SYST BP GE 130 - 139MM HG: CPT | Performed by: INTERNAL MEDICINE

## 2025-03-06 PROCEDURE — 1159F MED LIST DOCD IN RCRD: CPT | Performed by: INTERNAL MEDICINE

## 2025-03-06 PROCEDURE — G8427 DOCREV CUR MEDS BY ELIG CLIN: HCPCS | Performed by: INTERNAL MEDICINE

## 2025-03-06 PROCEDURE — 99211 OFF/OP EST MAY X REQ PHY/QHP: CPT | Performed by: INTERNAL MEDICINE

## 2025-03-06 PROCEDURE — 1090F PRES/ABSN URINE INCON ASSESS: CPT | Performed by: INTERNAL MEDICINE

## 2025-03-06 PROCEDURE — G8417 CALC BMI ABV UP PARAM F/U: HCPCS | Performed by: INTERNAL MEDICINE

## 2025-03-06 NOTE — PROGRESS NOTES
No weakness or numbness of the extremities. No changes in balance, coordination,  memory, mentation, behavior.   Allergic/Immunologic: No nasal congestion or hives. No repeated infections.       PHYSICAL EXAM:  The patient is not in acute distress.  Vital signs: Blood pressure 132/73, pulse 95, temperature 97.1 °F (36.2 °C), temperature source Temporal, resp. rate 16, weight 118.8 kg (262 lb).     General appearance - well appearing, not in pain or distress.  Patient is morbidly obese.  Mental status - good mood, alert and oriented  Eyes - pupils equal and reactive, extraocular eye movements intact  Ears - bilateral TM's and external ear canals normal  Nose - normal and patent, no erythema, discharge or polyps  Mouth - mucous membranes moist, pharynx normal without lesions  Neck - supple, no significant adenopathy  Lymphatics - no palpable lymphadenopathy, no hepatosplenomegaly  Chest - clear to auscultation, no wheezes, rales or rhonchi, symmetric air entry  Heart - normal rate, regular rhythm, normal S1, S2, no murmurs, rubs, clicks or gallops  Abdomen - soft, nontender, nondistended, no masses or organomegaly  Neurological - alert, oriented, normal speech, no focal findings or movement disorder noted  Musculoskeletal - no joint tenderness, deformity or swelling  Extremities - peripheral pulses normal, no pedal edema, no clubbing or cyanosis  Skin - normal coloration and turgor, no rashes, no suspicious skin lesions noted     Review of Diagnostic data:   Lab Results   Component Value Date    WBC 7.2 02/25/2025    HGB 9.7 (L) 02/25/2025    HCT 30.6 (L) 02/25/2025    MCV 96.5 02/25/2025     02/25/2025       Chemistry        Component Value Date/Time     08/13/2024 1424    K 3.7 08/13/2024 1424     08/13/2024 1424    CO2 22 08/13/2024 1424    BUN 19 08/13/2024 1424    CREATININE 1.0 (H) 08/13/2024 1424        Component Value Date/Time    CALCIUM 9.1 08/13/2024 1424    ALKPHOS 62 04/19/2024 1124

## 2025-03-06 NOTE — TELEPHONE ENCOUNTER
JOVANNA HERE FOR MD VISIT  RV about 4 months at [facility] with BMP, CBC, Iron studies, Vitamin B12/ folate before RV  LAB ORDERS MAILED TO PT  MD VISIT 7/10/25 @ 12:45PM  AVS PRINTED W/ INSTRUCTIONS AND GIVEN TO PT ON EXIT

## 2025-03-06 NOTE — PATIENT INSTRUCTIONS
RV about 4 months at [facility] with BMP, CBC, Iron studies, Vitamin B12/ folate before RV

## 2025-03-12 DIAGNOSIS — D64.9 NORMOCYTIC ANEMIA: Primary | ICD-10-CM

## 2025-03-13 DIAGNOSIS — E55.9 VITAMIN D DEFICIENCY: ICD-10-CM

## 2025-03-13 RX ORDER — CHOLECALCIFEROL (VITAMIN D3) 50 MCG
1 TABLET ORAL DAILY
Qty: 30 TABLET | Refills: 5 | Status: SHIPPED | OUTPATIENT
Start: 2025-03-13

## 2025-03-13 NOTE — TELEPHONE ENCOUNTER
Maureen Dupree is calling to request a refill on the following medication(s):    Medication Request:  Requested Prescriptions     Pending Prescriptions Disp Refills    Cholecalciferol (VITAMIN D3) 50 MCG (2000 UT) TABS 30 tablet 5     Sig: Take 1 tablet by mouth daily       Last Visit Date (If Applicable):  1/23/2025    Next Visit Date:    4/30/2025

## 2025-03-23 DIAGNOSIS — I26.99 ACUTE MASSIVE PULMONARY EMBOLISM (HCC): ICD-10-CM

## 2025-03-24 RX ORDER — APIXABAN 5 MG/1
5 TABLET, FILM COATED ORAL 2 TIMES DAILY
Qty: 60 TABLET | Refills: 5 | Status: SHIPPED | OUTPATIENT
Start: 2025-03-24

## 2025-03-24 NOTE — TELEPHONE ENCOUNTER
Maureen Dupree is calling to request a refill on the following medication(s):    Medication Request:  Requested Prescriptions     Pending Prescriptions Disp Refills    ELIQUIS 5 MG TABS tablet [Pharmacy Med Name: ELIQUIS 5 MG TABLET] 60 tablet 5     Sig: TAKE 1 TABLET BY MOUTH 2 TIMES A DAY       Last Visit Date (If Applicable):  1/23/2025    Next Visit Date:    4/30/2025

## 2025-04-23 DIAGNOSIS — I10 ESSENTIAL HYPERTENSION: ICD-10-CM

## 2025-04-23 RX ORDER — HYDROCHLOROTHIAZIDE 25 MG/1
25 TABLET ORAL EVERY MORNING
Qty: 90 TABLET | Refills: 1 | Status: SHIPPED | OUTPATIENT
Start: 2025-04-23

## 2025-04-23 NOTE — TELEPHONE ENCOUNTER
Requested Prescriptions     Pending Prescriptions Disp Refills    hydroCHLOROthiazide (HYDRODIURIL) 25 MG tablet [Pharmacy Med Name: hydroCHLOROthiazide 25 MG TABLET] 90 tablet 1     Sig: TAKE 1 TABLET BY MOUTH EVERY MORNING

## 2025-06-16 RX ORDER — MAGNESIUM 200 MG
1000 TABLET ORAL DAILY
Qty: 90 TABLET | Refills: 3 | Status: SHIPPED | OUTPATIENT
Start: 2025-06-16

## 2025-07-01 ENCOUNTER — HOSPITAL ENCOUNTER (OUTPATIENT)
Age: 75
Setting detail: SPECIMEN
Discharge: HOME | End: 2025-07-01

## 2025-07-01 DIAGNOSIS — D64.9 NORMOCYTIC ANEMIA: ICD-10-CM

## 2025-07-01 LAB
ANION GAP SERPL CALCULATED.3IONS-SCNC: 15 MMOL/L (ref 9–16)
BASOPHILS # BLD: 0.03 K/UL (ref 0–0.2)
BASOPHILS NFR BLD: 1 % (ref 0–2)
BUN SERPL-MCNC: 13 MG/DL (ref 8–23)
CALCIUM SERPL-MCNC: 8.8 MG/DL (ref 8.6–10.4)
CHLORIDE SERPL-SCNC: 105 MMOL/L (ref 98–107)
CO2 SERPL-SCNC: 21 MMOL/L (ref 20–31)
CREAT SERPL-MCNC: 0.9 MG/DL (ref 0.6–0.9)
EOSINOPHIL # BLD: 0.06 K/UL (ref 0–0.44)
EOSINOPHILS RELATIVE PERCENT: 1 % (ref 1–4)
ERYTHROCYTE [DISTWIDTH] IN BLOOD BY AUTOMATED COUNT: 15 % (ref 11.8–14.4)
FERRITIN SERPL-MCNC: 10 NG/ML
FOLATE SERPL-MCNC: 11 NG/ML (ref 4.8–24.2)
GFR, ESTIMATED: 66 ML/MIN/1.73M2
GLUCOSE SERPL-MCNC: 153 MG/DL (ref 74–99)
HCT VFR BLD AUTO: 25.9 % (ref 36.3–47.1)
HGB BLD-MCNC: 7.5 G/DL (ref 11.9–15.1)
IMM GRANULOCYTES # BLD AUTO: 0.05 K/UL (ref 0–0.3)
IMM GRANULOCYTES NFR BLD: 1 %
IRON SATN MFR SERPL: 6 % (ref 20–55)
IRON SERPL-MCNC: 20 UG/DL (ref 37–145)
LYMPHOCYTES NFR BLD: 0.98 K/UL (ref 1.1–3.7)
LYMPHOCYTES RELATIVE PERCENT: 17 % (ref 24–43)
MCH RBC QN AUTO: 26.1 PG (ref 25.2–33.5)
MCHC RBC AUTO-ENTMCNC: 29 G/DL (ref 28.4–34.8)
MCV RBC AUTO: 90.2 FL (ref 82.6–102.9)
MONOCYTES NFR BLD: 0.6 K/UL (ref 0.1–1.2)
MONOCYTES NFR BLD: 11 % (ref 3–12)
NEUTROPHILS NFR BLD: 69 % (ref 36–65)
NEUTS SEG NFR BLD: 3.91 K/UL (ref 1.5–8.1)
NRBC BLD-RTO: 0 PER 100 WBC
PLATELET # BLD AUTO: 303 K/UL (ref 138–453)
PMV BLD AUTO: 10.5 FL (ref 8.1–13.5)
POTASSIUM SERPL-SCNC: 3.4 MMOL/L (ref 3.7–5.3)
RBC # BLD AUTO: 2.87 M/UL (ref 3.95–5.11)
RBC # BLD: ABNORMAL 10*6/UL
SODIUM SERPL-SCNC: 141 MMOL/L (ref 136–145)
TIBC SERPL-MCNC: 334 UG/DL (ref 250–450)
UNSATURATED IRON BINDING CAPACITY: 314 UG/DL (ref 112–347)
VIT B12 SERPL-MCNC: 1439 PG/ML (ref 232–1245)
WBC OTHER # BLD: 5.6 K/UL (ref 3.5–11.3)

## 2025-07-10 ENCOUNTER — OFFICE VISIT (OUTPATIENT)
Age: 75
End: 2025-07-10
Payer: MEDICARE

## 2025-07-10 ENCOUNTER — HOSPITAL ENCOUNTER (OUTPATIENT)
Facility: MEDICAL CENTER | Age: 75
End: 2025-07-10

## 2025-07-10 ENCOUNTER — TELEPHONE (OUTPATIENT)
Age: 75
End: 2025-07-10

## 2025-07-10 VITALS
WEIGHT: 276 LBS | TEMPERATURE: 97.1 F | RESPIRATION RATE: 18 BRPM | HEART RATE: 88 BPM | BODY MASS INDEX: 52.15 KG/M2 | DIASTOLIC BLOOD PRESSURE: 77 MMHG | SYSTOLIC BLOOD PRESSURE: 145 MMHG

## 2025-07-10 DIAGNOSIS — E61.1 IRON DEFICIENCY: Primary | ICD-10-CM

## 2025-07-10 DIAGNOSIS — K90.9 IRON MALABSORPTION: ICD-10-CM

## 2025-07-10 DIAGNOSIS — D64.9 NORMOCYTIC ANEMIA: ICD-10-CM

## 2025-07-10 DIAGNOSIS — E53.8 VITAMIN B12 DEFICIENCY: ICD-10-CM

## 2025-07-10 PROCEDURE — 1125F AMNT PAIN NOTED PAIN PRSNT: CPT | Performed by: INTERNAL MEDICINE

## 2025-07-10 PROCEDURE — 1036F TOBACCO NON-USER: CPT | Performed by: INTERNAL MEDICINE

## 2025-07-10 PROCEDURE — 3077F SYST BP >= 140 MM HG: CPT | Performed by: INTERNAL MEDICINE

## 2025-07-10 PROCEDURE — 3078F DIAST BP <80 MM HG: CPT | Performed by: INTERNAL MEDICINE

## 2025-07-10 PROCEDURE — G8417 CALC BMI ABV UP PARAM F/U: HCPCS | Performed by: INTERNAL MEDICINE

## 2025-07-10 PROCEDURE — G8399 PT W/DXA RESULTS DOCUMENT: HCPCS | Performed by: INTERNAL MEDICINE

## 2025-07-10 PROCEDURE — 99213 OFFICE O/P EST LOW 20 MIN: CPT | Performed by: INTERNAL MEDICINE

## 2025-07-10 PROCEDURE — 99214 OFFICE O/P EST MOD 30 MIN: CPT | Performed by: INTERNAL MEDICINE

## 2025-07-10 PROCEDURE — G8427 DOCREV CUR MEDS BY ELIG CLIN: HCPCS | Performed by: INTERNAL MEDICINE

## 2025-07-10 PROCEDURE — 1123F ACP DISCUSS/DSCN MKR DOCD: CPT | Performed by: INTERNAL MEDICINE

## 2025-07-10 PROCEDURE — 1159F MED LIST DOCD IN RCRD: CPT | Performed by: INTERNAL MEDICINE

## 2025-07-10 PROCEDURE — 1090F PRES/ABSN URINE INCON ASSESS: CPT | Performed by: INTERNAL MEDICINE

## 2025-07-10 RX ORDER — HEPARIN SODIUM (PORCINE) LOCK FLUSH IV SOLN 100 UNIT/ML 100 UNIT/ML
500 SOLUTION INTRAVENOUS PRN
OUTPATIENT
Start: 2025-07-10

## 2025-07-10 RX ORDER — ALBUTEROL SULFATE 90 UG/1
4 INHALANT RESPIRATORY (INHALATION) PRN
OUTPATIENT
Start: 2025-07-10

## 2025-07-10 RX ORDER — SODIUM CHLORIDE 9 MG/ML
5-250 INJECTION, SOLUTION INTRAVENOUS PRN
OUTPATIENT
Start: 2025-07-10

## 2025-07-10 RX ORDER — ONDANSETRON 2 MG/ML
8 INJECTION INTRAMUSCULAR; INTRAVENOUS
OUTPATIENT
Start: 2025-07-10

## 2025-07-10 RX ORDER — SODIUM CHLORIDE 9 MG/ML
INJECTION, SOLUTION INTRAVENOUS PRN
OUTPATIENT
Start: 2025-07-10

## 2025-07-10 RX ORDER — HYDROCORTISONE SODIUM SUCCINATE 100 MG/2ML
100 INJECTION INTRAMUSCULAR; INTRAVENOUS
OUTPATIENT
Start: 2025-07-10

## 2025-07-10 RX ORDER — DIPHENHYDRAMINE HYDROCHLORIDE 50 MG/ML
50 INJECTION, SOLUTION INTRAMUSCULAR; INTRAVENOUS
OUTPATIENT
Start: 2025-07-10

## 2025-07-10 RX ORDER — ACETAMINOPHEN 325 MG/1
650 TABLET ORAL
OUTPATIENT
Start: 2025-07-10

## 2025-07-10 RX ORDER — FAMOTIDINE 10 MG/ML
20 INJECTION, SOLUTION INTRAVENOUS
OUTPATIENT
Start: 2025-07-10

## 2025-07-10 RX ORDER — SODIUM CHLORIDE 0.9 % (FLUSH) 0.9 %
5-40 SYRINGE (ML) INJECTION PRN
OUTPATIENT
Start: 2025-07-10

## 2025-07-10 RX ORDER — EPINEPHRINE 1 MG/ML
0.3 INJECTION, SOLUTION, CONCENTRATE INTRAVENOUS PRN
OUTPATIENT
Start: 2025-07-10

## 2025-07-10 NOTE — PATIENT INSTRUCTIONS
IV iron infusion soon  RV about 4 months with BMP, CBC, Iron studies, Vitamin B12/ folate before RV

## 2025-07-10 NOTE — TELEPHONE ENCOUNTER
JOVANNA HERE FOR MD VISIT  IV iron infusion soon  RV about 4 months with BMP, CBC, Iron studies, Vitamin B12/ folate before RV  NEW ORDER IS PENDING PRECERT  LAB ORDERS MAILED TO PT   MD VISIT 11/13/25 @ 10:15AM  AVS PRINTED W/ INSTRUCTIONS AND GIVEN TO PT ON EXIT

## 2025-07-10 NOTE — PROGRESS NOTES
_  Chief Complaint   Patient presents with    Follow-up    Discuss Labs     DIAGNOSIS:       Normocytic anemia  Evidence of iron deficiency  Intolerable side effects to oral iron.  Evidence of vitamin B12 deficiency  Probable anemia of chronic disease  Multiple comorbidities as listed    CURRENT THERAPY:         IV iron infusion  Vitamin B12 replacement  BRIEF CASE HISTORY:      Ms. Maureen Dupree is a very pleasant 75 y.o. female with history of multiple co morbidities as listed.  Patient is referred for evaluation and further management of anemia.  Patient has chronic weakness and fatigue.  No other significant symptoms.  She had routine blood work which showed normocytic anemia.  She had multiple lab tests in the past which showed similar findings.  Patient was started on oral iron and she had significant constipation and GI toxicity so she stopped it.  She was also noted to have vitamin B12 deficiency and received vitamin B12 injections.  3 injections were given so far.  Patient denies any active bleeding.  No melena or hematochezia.  No hematemesis.  No history of ulcers or surgeries.  Patient denies fever or night sweats.  No enlarged lymph nodes.  Patient denies smoking or alcohol drinking..     INTERIM HISTORY:   Patient seen for follow-up anemia.  She had iron infusion and vitamin B12 replacement as well.  Clinically she felt much better.    Recently started feeling increasing weakness and fatigue..  No dizziness.  No palpitation.  No shortness of breath.  No active bleeding.  No other complaints.      PAST MEDICAL HISTORY: has a past medical history of Acute pulmonary embolism (HCC), Acute respiratory failure with hypoxia (HCC), COPD (chronic obstructive pulmonary disease) (HCC), Depression, Disease of blood and blood forming organ, Elevated blood pressure readings, Hypertension, and Troponin level elevated.    PAST

## 2025-07-11 ENCOUNTER — TELEPHONE (OUTPATIENT)
Age: 75
End: 2025-07-11

## 2025-07-11 DIAGNOSIS — D64.9 NORMOCYTIC ANEMIA: Primary | ICD-10-CM

## 2025-07-11 NOTE — TELEPHONE ENCOUNTER
WRITER CALLED PT TO SCHEDULE IRON INFUSION. PT DID NOT ANSWER & VM WAS LEFT FOR PT TO CALL OFFICE BACK

## 2025-07-12 DIAGNOSIS — D63.1 ANEMIA DUE TO STAGE 3A CHRONIC KIDNEY DISEASE (HCC): ICD-10-CM

## 2025-07-12 DIAGNOSIS — N18.31 ANEMIA DUE TO STAGE 3A CHRONIC KIDNEY DISEASE (HCC): ICD-10-CM

## 2025-07-14 RX ORDER — DOCUSATE SODIUM 100 MG/1
100 CAPSULE, LIQUID FILLED ORAL 2 TIMES DAILY
Qty: 180 CAPSULE | Refills: 1 | Status: SHIPPED | OUTPATIENT
Start: 2025-07-14

## 2025-07-14 NOTE — TELEPHONE ENCOUNTER
Maureen Dupere is calling to request a refill on the following medication(s):    Medication Request:  Requested Prescriptions     Pending Prescriptions Disp Refills    docusate sodium (COLACE) 100 MG capsule 180 capsule 1     Sig: Take 1 capsule by mouth 2 times daily       Last Visit Date (If Applicable):  1/23/2025    Next Visit Date:    7/29/2025

## 2025-07-23 ENCOUNTER — HOSPITAL ENCOUNTER (OUTPATIENT)
Facility: MEDICAL CENTER | Age: 75
End: 2025-07-23
Payer: MEDICARE

## 2025-07-29 ENCOUNTER — OFFICE VISIT (OUTPATIENT)
Dept: FAMILY MEDICINE CLINIC | Age: 75
End: 2025-07-29

## 2025-07-29 VITALS
HEART RATE: 72 BPM | SYSTOLIC BLOOD PRESSURE: 130 MMHG | HEIGHT: 61 IN | DIASTOLIC BLOOD PRESSURE: 80 MMHG | OXYGEN SATURATION: 98 % | BODY MASS INDEX: 52.15 KG/M2

## 2025-07-29 DIAGNOSIS — I10 ESSENTIAL HYPERTENSION: Primary | ICD-10-CM

## 2025-07-29 DIAGNOSIS — I26.99 BILATERAL PULMONARY EMBOLISM (HCC): ICD-10-CM

## 2025-07-29 DIAGNOSIS — G47.33 OBSTRUCTIVE SLEEP APNEA SYNDROME: ICD-10-CM

## 2025-07-29 DIAGNOSIS — E55.9 VITAMIN D DEFICIENCY: ICD-10-CM

## 2025-07-29 DIAGNOSIS — R73.03 PREDIABETES: ICD-10-CM

## 2025-07-29 RX ORDER — HYDROCHLOROTHIAZIDE 25 MG/1
25 TABLET ORAL EVERY MORNING
Qty: 90 TABLET | Refills: 1 | Status: SHIPPED | OUTPATIENT
Start: 2025-07-29

## 2025-07-29 SDOH — ECONOMIC STABILITY: FOOD INSECURITY: WITHIN THE PAST 12 MONTHS, YOU WORRIED THAT YOUR FOOD WOULD RUN OUT BEFORE YOU GOT MONEY TO BUY MORE.: NEVER TRUE

## 2025-07-29 SDOH — ECONOMIC STABILITY: FOOD INSECURITY: WITHIN THE PAST 12 MONTHS, THE FOOD YOU BOUGHT JUST DIDN'T LAST AND YOU DIDN'T HAVE MONEY TO GET MORE.: NEVER TRUE

## 2025-07-29 ASSESSMENT — PATIENT HEALTH QUESTIONNAIRE - PHQ9: DEPRESSION UNABLE TO ASSESS: PT REFUSES

## 2025-07-29 NOTE — PROGRESS NOTES
[facility]  [facility]  [address] [place] [address]  [address]  [facility] [place] [address]  Dept: [phone]  Dept Fax: [phone]    Maureen Dupree is a 75 y.o. female who is a Established patient, who presents today for her medical conditions/complaints as noted below:  Chief Complaint   Patient presents with    Hypertension         HPI:     HPI  History of Present Illness  The patient presents for sleep apnea, iron deficiency, and medication management.    She has been experiencing fluctuations in her health status over the past few months. She reports waking up 3 to 4 times per night to urinate and has not undergone a sleep study due to this issue. The doctor suggested that treating sleep apnea might improve her nighttime urination.    She is under the care of Dr. Nuno, who has prescribed infusions for her. After a 6-month break, she resumed the treatment. However, she now reports a lack of iron and is scheduled to start another round of infusions tomorrow. Her B12 levels are within the normal range.    She recently had blood work done and is due for a repeat test in 4 months. She continues to take Eliquis, which she refills at [facility], and other medications without any changes. She has a week's supply of Eliquis left and will need a refill thereafter. She also anticipates needing a refill of hydrochlorothiazide soon.    Social History:  Sleep: She reports waking up 3 to 4 times per night to urinate.        Hemoglobin A1C (%)   Date Value   08/13/2024 5.4   04/19/2024 6.0   09/14/2023 5.5             ( goal A1Cis < 7)   No components found for: \"LABMICR\"  No components found for: \"LDLCHOLESTEROL\", \"LDLCALC\"    (goal LDL is <100)   AST (U/L)   Date Value   04/19/2024 24     ALT (U/L)   Date Value   04/19/2024 15     BUN (mg/dL)   Date Value   07/01/2025 13     BP Readings from Last 3 Encounters:   07/29/25 130/80   07/10/25 (!) 145/77   03/06/25 132/73          (goal 120/80)    Past

## 2025-07-30 ENCOUNTER — HOSPITAL ENCOUNTER (OUTPATIENT)
Dept: INFUSION THERAPY | Facility: MEDICAL CENTER | Age: 75
Discharge: HOME | End: 2025-07-30
Payer: MEDICARE

## 2025-07-30 VITALS
HEART RATE: 95 BPM | SYSTOLIC BLOOD PRESSURE: 119 MMHG | DIASTOLIC BLOOD PRESSURE: 80 MMHG | TEMPERATURE: 97.7 F | OXYGEN SATURATION: 99 % | RESPIRATION RATE: 18 BRPM

## 2025-07-30 DIAGNOSIS — D50.8 IRON DEFICIENCY ANEMIA SECONDARY TO INADEQUATE DIETARY IRON INTAKE: Primary | ICD-10-CM

## 2025-07-30 DIAGNOSIS — D64.9 NORMOCYTIC ANEMIA: ICD-10-CM

## 2025-07-30 DIAGNOSIS — K90.9 IRON MALABSORPTION: ICD-10-CM

## 2025-07-30 PROCEDURE — 2580000003 HC RX 258: Performed by: INTERNAL MEDICINE

## 2025-07-30 PROCEDURE — 96365 THER/PROPH/DIAG IV INF INIT: CPT

## 2025-07-30 PROCEDURE — 6360000002 HC RX W HCPCS: Performed by: INTERNAL MEDICINE

## 2025-07-30 RX ORDER — SODIUM CHLORIDE 9 MG/ML
5-250 INJECTION, SOLUTION INTRAVENOUS PRN
OUTPATIENT
Start: 2025-08-06

## 2025-07-30 RX ORDER — FAMOTIDINE 10 MG/ML
20 INJECTION, SOLUTION INTRAVENOUS
OUTPATIENT
Start: 2025-08-06

## 2025-07-30 RX ORDER — SODIUM CHLORIDE 0.9 % (FLUSH) 0.9 %
5-40 SYRINGE (ML) INJECTION PRN
OUTPATIENT
Start: 2025-08-06

## 2025-07-30 RX ORDER — HYDROCORTISONE SODIUM SUCCINATE 100 MG/2ML
100 INJECTION INTRAMUSCULAR; INTRAVENOUS
OUTPATIENT
Start: 2025-08-06

## 2025-07-30 RX ORDER — SODIUM CHLORIDE 9 MG/ML
5-250 INJECTION, SOLUTION INTRAVENOUS PRN
Status: DISCONTINUED | OUTPATIENT
Start: 2025-07-30 | End: 2025-07-31 | Stop reason: HOSPADM

## 2025-07-30 RX ORDER — HEPARIN 100 UNIT/ML
500 SYRINGE INTRAVENOUS PRN
OUTPATIENT
Start: 2025-08-06

## 2025-07-30 RX ORDER — DIPHENHYDRAMINE HYDROCHLORIDE 50 MG/ML
50 INJECTION, SOLUTION INTRAMUSCULAR; INTRAVENOUS
OUTPATIENT
Start: 2025-08-06

## 2025-07-30 RX ORDER — ACETAMINOPHEN 325 MG/1
650 TABLET ORAL
OUTPATIENT
Start: 2025-08-06

## 2025-07-30 RX ORDER — EPINEPHRINE 1 MG/ML
0.3 INJECTION, SOLUTION INTRAMUSCULAR; SUBCUTANEOUS PRN
OUTPATIENT
Start: 2025-08-06

## 2025-07-30 RX ORDER — SODIUM CHLORIDE 9 MG/ML
INJECTION, SOLUTION INTRAVENOUS PRN
OUTPATIENT
Start: 2025-08-06

## 2025-07-30 RX ORDER — ALBUTEROL SULFATE 90 UG/1
4 INHALANT RESPIRATORY (INHALATION) PRN
OUTPATIENT
Start: 2025-08-06

## 2025-07-30 RX ORDER — ONDANSETRON 2 MG/ML
8 INJECTION INTRAMUSCULAR; INTRAVENOUS
OUTPATIENT
Start: 2025-08-06

## 2025-07-30 RX ADMIN — FERRIC CARBOXYMALTOSE INJECTION 750 MG: 50 INJECTION, SOLUTION INTRAVENOUS at 11:28

## 2025-07-30 RX ADMIN — SODIUM CHLORIDE 100 ML/HR: 0.9 INJECTION, SOLUTION INTRAVENOUS at 11:14

## 2025-07-30 ASSESSMENT — ENCOUNTER SYMPTOMS
DIARRHEA: 0
ABDOMINAL PAIN: 0
CONSTIPATION: 0
NAUSEA: 0
WHEEZING: 0
COUGH: 0
SORE THROAT: 0
CHEST TIGHTNESS: 0
EYE DISCHARGE: 0
SHORTNESS OF BREATH: 0
VOMITING: 0

## 2025-07-30 ASSESSMENT — PAIN SCALES - GENERAL: PAINLEVEL_OUTOF10: 10

## 2025-07-30 ASSESSMENT — PAIN DESCRIPTION - LOCATION: LOCATION: KNEE

## 2025-07-30 ASSESSMENT — PAIN DESCRIPTION - ORIENTATION: ORIENTATION: RIGHT;LEFT

## 2025-07-30 ASSESSMENT — PAIN DESCRIPTION - PAIN TYPE: TYPE: CHRONIC PAIN

## 2025-07-30 NOTE — PROGRESS NOTES
Patient arrive ambulatory for 1 of 2 injectafer infusion.  Denies any complaint or concern.  Vitals as charted.  Peripheral IV established per policy.  Patient has had injectafer before and did well.  Injectafer infused with no sign of adverse reaction; line flushed while monitoring patient.  Intact IV catheter removed with pressure dressing applied.  Patient discharged with knowledge of next appointment.

## 2025-08-04 ENCOUNTER — HOSPITAL ENCOUNTER (OUTPATIENT)
Facility: MEDICAL CENTER | Age: 75
End: 2025-08-04
Payer: MEDICARE

## 2025-08-06 ENCOUNTER — HOSPITAL ENCOUNTER (OUTPATIENT)
Dept: INFUSION THERAPY | Facility: MEDICAL CENTER | Age: 75
Discharge: HOME | End: 2025-08-06
Payer: MEDICARE

## 2025-08-06 VITALS
SYSTOLIC BLOOD PRESSURE: 101 MMHG | WEIGHT: 275 LBS | RESPIRATION RATE: 18 BRPM | OXYGEN SATURATION: 99 % | BODY MASS INDEX: 51.92 KG/M2 | HEART RATE: 78 BPM | DIASTOLIC BLOOD PRESSURE: 51 MMHG | HEIGHT: 61 IN | TEMPERATURE: 97.9 F

## 2025-08-06 DIAGNOSIS — D50.8 IRON DEFICIENCY ANEMIA SECONDARY TO INADEQUATE DIETARY IRON INTAKE: Primary | ICD-10-CM

## 2025-08-06 DIAGNOSIS — K90.9 IRON MALABSORPTION: ICD-10-CM

## 2025-08-06 DIAGNOSIS — D64.9 NORMOCYTIC ANEMIA: ICD-10-CM

## 2025-08-06 PROCEDURE — 96365 THER/PROPH/DIAG IV INF INIT: CPT

## 2025-08-06 PROCEDURE — 2580000003 HC RX 258: Performed by: INTERNAL MEDICINE

## 2025-08-06 PROCEDURE — 6360000002 HC RX W HCPCS: Performed by: INTERNAL MEDICINE

## 2025-08-06 RX ORDER — SODIUM CHLORIDE 9 MG/ML
5-250 INJECTION, SOLUTION INTRAVENOUS PRN
Status: CANCELLED | OUTPATIENT
Start: 2025-08-13

## 2025-08-06 RX ORDER — ALBUTEROL SULFATE 90 UG/1
4 INHALANT RESPIRATORY (INHALATION) PRN
OUTPATIENT
Start: 2025-08-13

## 2025-08-06 RX ORDER — SODIUM CHLORIDE 9 MG/ML
INJECTION, SOLUTION INTRAVENOUS PRN
OUTPATIENT
Start: 2025-08-13

## 2025-08-06 RX ORDER — ONDANSETRON 2 MG/ML
8 INJECTION INTRAMUSCULAR; INTRAVENOUS
OUTPATIENT
Start: 2025-08-13

## 2025-08-06 RX ORDER — SODIUM CHLORIDE 9 MG/ML
5-250 INJECTION, SOLUTION INTRAVENOUS PRN
Status: DISCONTINUED | OUTPATIENT
Start: 2025-08-06 | End: 2025-08-07 | Stop reason: HOSPADM

## 2025-08-06 RX ORDER — HYDROCORTISONE SODIUM SUCCINATE 100 MG/2ML
100 INJECTION INTRAMUSCULAR; INTRAVENOUS
OUTPATIENT
Start: 2025-08-13

## 2025-08-06 RX ORDER — ACETAMINOPHEN 325 MG/1
650 TABLET ORAL
OUTPATIENT
Start: 2025-08-13

## 2025-08-06 RX ORDER — HEPARIN 100 UNIT/ML
500 SYRINGE INTRAVENOUS PRN
OUTPATIENT
Start: 2025-08-13

## 2025-08-06 RX ORDER — EPINEPHRINE 1 MG/ML
0.3 INJECTION, SOLUTION INTRAMUSCULAR; SUBCUTANEOUS PRN
OUTPATIENT
Start: 2025-08-13

## 2025-08-06 RX ORDER — SODIUM CHLORIDE 9 MG/ML
5-250 INJECTION, SOLUTION INTRAVENOUS PRN
OUTPATIENT
Start: 2025-08-13

## 2025-08-06 RX ORDER — DIPHENHYDRAMINE HYDROCHLORIDE 50 MG/ML
50 INJECTION, SOLUTION INTRAMUSCULAR; INTRAVENOUS
OUTPATIENT
Start: 2025-08-13

## 2025-08-06 RX ORDER — SODIUM CHLORIDE 0.9 % (FLUSH) 0.9 %
5-40 SYRINGE (ML) INJECTION PRN
OUTPATIENT
Start: 2025-08-13

## 2025-08-06 RX ORDER — FAMOTIDINE 10 MG/ML
20 INJECTION, SOLUTION INTRAVENOUS
OUTPATIENT
Start: 2025-08-13

## 2025-08-06 RX ADMIN — SODIUM CHLORIDE 75 ML/HR: 0.9 INJECTION, SOLUTION INTRAVENOUS at 12:14

## 2025-08-06 RX ADMIN — FERRIC CARBOXYMALTOSE INJECTION 750 MG: 50 INJECTION, SOLUTION INTRAVENOUS at 12:15

## 2025-08-07 ENCOUNTER — HOSPITAL ENCOUNTER (OUTPATIENT)
Dept: SLEEP CENTER | Age: 75
Discharge: HOME | End: 2025-08-09
Payer: MEDICARE

## 2025-08-07 DIAGNOSIS — G47.33 OBSTRUCTIVE SLEEP APNEA SYNDROME: ICD-10-CM

## 2025-08-07 PROCEDURE — G0399 HOME SLEEP TEST/TYPE 3 PORTA: HCPCS

## 2025-09-04 DIAGNOSIS — G47.33 OSA (OBSTRUCTIVE SLEEP APNEA): Primary | ICD-10-CM
